# Patient Record
Sex: FEMALE | Race: WHITE | NOT HISPANIC OR LATINO | Employment: OTHER | URBAN - METROPOLITAN AREA
[De-identification: names, ages, dates, MRNs, and addresses within clinical notes are randomized per-mention and may not be internally consistent; named-entity substitution may affect disease eponyms.]

---

## 2014-11-03 LAB — HM PAP SMEAR: NEGATIVE

## 2014-12-05 LAB — HM MAMMOGRAM: NORMAL

## 2017-03-27 DIAGNOSIS — M85.80 OSTEOPENIA: ICD-10-CM

## 2017-03-27 DIAGNOSIS — R73.9 HYPERGLYCEMIA: Primary | ICD-10-CM

## 2017-03-27 DIAGNOSIS — E78.5 HYPERLIPIDEMIA, UNSPECIFIED HYPERLIPIDEMIA TYPE: ICD-10-CM

## 2017-03-28 LAB
25(OH)D3+25(OH)D2 SERPL-MCNC: 31.5 NG/ML (ref 30–100)
ALBUMIN SERPL-MCNC: 4.6 G/DL (ref 3.5–5.2)
ALBUMIN/GLOB SERPL: 2.1 G/DL
ALP SERPL-CCNC: 125 U/L (ref 39–117)
ALT SERPL-CCNC: 26 U/L (ref 1–33)
AST SERPL-CCNC: 23 U/L (ref 1–32)
BILIRUB SERPL-MCNC: 0.4 MG/DL (ref 0.1–1.2)
BUN SERPL-MCNC: 13 MG/DL (ref 8–23)
BUN/CREAT SERPL: 14.6 (ref 7–25)
CALCIUM SERPL-MCNC: 9.4 MG/DL (ref 8.6–10.5)
CHLORIDE SERPL-SCNC: 102 MMOL/L (ref 98–107)
CHOLEST SERPL-MCNC: 171 MG/DL (ref 0–200)
CO2 SERPL-SCNC: 26.5 MMOL/L (ref 22–29)
CREAT SERPL-MCNC: 0.89 MG/DL (ref 0.57–1)
GLOBULIN SER CALC-MCNC: 2.2 GM/DL
GLUCOSE SERPL-MCNC: 113 MG/DL (ref 65–99)
HBA1C MFR BLD: 5.74 % (ref 4.8–5.6)
HDLC SERPL-MCNC: 51 MG/DL (ref 40–60)
LDLC SERPL CALC-MCNC: 98 MG/DL (ref 0–100)
LDLC/HDLC SERPL: 1.93 {RATIO}
POTASSIUM SERPL-SCNC: 4.2 MMOL/L (ref 3.5–5.2)
PROT SERPL-MCNC: 6.8 G/DL (ref 6–8.5)
SODIUM SERPL-SCNC: 143 MMOL/L (ref 136–145)
TRIGL SERPL-MCNC: 108 MG/DL (ref 0–150)
VLDLC SERPL CALC-MCNC: 21.6 MG/DL (ref 5–40)

## 2017-03-30 ENCOUNTER — OFFICE VISIT (OUTPATIENT)
Dept: FAMILY MEDICINE CLINIC | Facility: CLINIC | Age: 72
End: 2017-03-30

## 2017-03-30 VITALS
TEMPERATURE: 97.8 F | BODY MASS INDEX: 31.02 KG/M2 | WEIGHT: 158 LBS | HEART RATE: 69 BPM | OXYGEN SATURATION: 98 % | SYSTOLIC BLOOD PRESSURE: 152 MMHG | RESPIRATION RATE: 16 BRPM | DIASTOLIC BLOOD PRESSURE: 72 MMHG | HEIGHT: 60 IN

## 2017-03-30 DIAGNOSIS — E78.00 PURE HYPERCHOLESTEROLEMIA: Primary | ICD-10-CM

## 2017-03-30 DIAGNOSIS — E55.9 MILD VITAMIN D DEFICIENCY: ICD-10-CM

## 2017-03-30 DIAGNOSIS — Z12.31 VISIT FOR SCREENING MAMMOGRAM: Primary | ICD-10-CM

## 2017-03-30 DIAGNOSIS — R73.9 BLOOD GLUCOSE ELEVATED: ICD-10-CM

## 2017-03-30 DIAGNOSIS — M85.80 OSTEOPENIA: ICD-10-CM

## 2017-03-30 PROCEDURE — 99213 OFFICE O/P EST LOW 20 MIN: CPT

## 2017-03-30 NOTE — PROGRESS NOTES
Subjective   Jody Simmons is a 71 y.o. female. Patient is here today for   Chief Complaint   Patient presents with   • Hyperglycemia   • Hyperlipidemia   • Osteopenia          Vitals:    03/30/17 0850   BP: 152/72   Pulse: 69   Resp: 16   Temp: 97.8 °F (36.6 °C)   SpO2: 98%     The following portions of the patient's history were reviewed and updated as appropriate: allergies, current medications, past family history, past medical history, past social history, past surgical history and problem list.    Past Medical History:   Diagnosis Date   • Dyslipidemia    • Endometrial polyp 04/21/2015    benign    • Fibroid    • H/O pelvic mass    • Hyperlipidemia    • Osteopenia    • PMB (postmenopausal bleeding)    • UTI (urinary tract infection)       Allergies   Allergen Reactions   • No Known Drug Allergy       Social History     Social History   • Marital status: Single     Spouse name: N/A   • Number of children: N/A   • Years of education: N/A     Occupational History   • Not on file.     Social History Main Topics   • Smoking status: Never Smoker   • Smokeless tobacco: Never Used   • Alcohol use Yes      Comment: SOCIAL   • Drug use: No   • Sexual activity: Yes     Partners: Male     Birth control/ protection: Post-menopausal     Other Topics Concern   • Not on file     Social History Narrative        Current Outpatient Prescriptions:   •  aspirin 81 MG tablet, Take  by mouth., Disp: , Rfl:   •  Cholecalciferol (VITAMIN D) 1000 UNITS tablet, Take  by mouth., Disp: , Rfl:   •  Multiple Minerals-Vitamins (ADVANCED CALCIUM/D/MAGNESIUM) tablet, Take  by mouth., Disp: , Rfl:   •  MULTIPLE VITAMINS ESSENTIAL PO, Take  by mouth., Disp: , Rfl:   •  Probiotic capsule, Take  by mouth., Disp: , Rfl:   •  simvastatin (ZOCOR) 20 MG tablet, Take 1 tablet by mouth every night., Disp: 90 tablet, Rfl: 3     Objective     History of Present Illness   The patient is here today for follow-up on hyperglycemia, hyperlipidemia, and vitamin  D deficiency.    Review of Systems   Constitutional: Negative.    HENT: Negative.    Respiratory: Negative for cough, shortness of breath and wheezing.    Cardiovascular: Negative for chest pain, palpitations and leg swelling.   Gastrointestinal: Negative for abdominal distention, abdominal pain, blood in stool, constipation, diarrhea and nausea.   Genitourinary: Negative.    Musculoskeletal:        Minor aches and pains only   Neurological: Negative.    Hematological: Negative.    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 She appears well-nourished.   Moderately overweight   Eyes: Pupils are equal, round, and reactive to light.   Neck:   Carotid pulses normal   Cardiovascular: Normal rate, regular rhythm and normal heart sounds.    Pulmonary/Chest: Effort normal and breath sounds normal. No respiratory distress. She has no wheezes. She has no rales.   Abdominal: Soft. Bowel sounds are normal. She exhibits no mass. There is no tenderness.   Musculoskeletal: Normal range of motion.   Neurological: She is alert and oriented to person, place, and time.   Skin: Skin is warm and dry.   Psychiatric: She has a normal mood and affect.   Nursing note and vitals reviewed.      ASSESSMENT  #1 hyperglycemia        #2 hyperlipidemia         #3 vitamin D deficiency         #4 osteopenia    DISCUSSION/SUMMARY    The patient's blood pressure was initially elevated at 152/72 but upon recheck it was down to 140/70.  The patient will monitor this and let me know if her average systolic blood pressure exceeds 140.  The patient's vitamin D level is 31.5 and I asked her to increase her vitamin D3 dose to 2000 units Daily.  CMP is normal except for elevated fasting blood sugar of 113 and minimal elevation of  her alkaline phosphatase at 125.  The patient's HbA1c is borderline elevated at 5.74%.  We again discussed a low sugar, low starch as well as low-fat diet.  The patient will try to increase her walking as well.  The patient's total cholesterol is 171, triglycerides 108, HDL cholesterol 51, and LDL cholesterol is 98.  The patient states that she went off her simvastatin for a few weeks several months ago because she was having some muscle weakness and achiness and she thinks it did improve.  The patient restarted her medicine a couple of months ago and feels that it does affect her.  Because of this I am going to switch her to atorvastatin 10 mg daily to see if she has better results with this medication as regards side effects.    PLAN  Mammogram in the next few months.  Vascular screening in the next few months.  Recheck in 6 months with fasting CMP, lipid panel, vitamin D level and hemoglobin A1c.    No Follow-up on file.

## 2017-08-18 ENCOUNTER — TRANSCRIBE ORDERS (OUTPATIENT)
Dept: ADMINISTRATIVE | Facility: HOSPITAL | Age: 72
End: 2017-08-18

## 2017-08-18 DIAGNOSIS — Z12.31 SCREENING MAMMOGRAM, ENCOUNTER FOR: Primary | ICD-10-CM

## 2017-08-18 DIAGNOSIS — Z13.6 ENCOUNTER FOR SCREENING FOR VASCULAR DISEASE: Primary | ICD-10-CM

## 2017-08-22 ENCOUNTER — HOSPITAL ENCOUNTER (OUTPATIENT)
Dept: MAMMOGRAPHY | Facility: HOSPITAL | Age: 72
Discharge: HOME | End: 2017-08-22

## 2017-08-22 DIAGNOSIS — Z12.31 SCREENING MAMMOGRAM, ENCOUNTER FOR: ICD-10-CM

## 2017-08-22 PROCEDURE — G0202 SCR MAMMO BI INCL CAD: HCPCS

## 2017-08-31 ENCOUNTER — HOSPITAL ENCOUNTER (OUTPATIENT)
Dept: CARDIOLOGY | Facility: HOSPITAL | Age: 72
Discharge: HOME | End: 2017-08-31

## 2017-08-31 VITALS
WEIGHT: 157 LBS | HEIGHT: 59 IN | HEART RATE: 75 BPM | DIASTOLIC BLOOD PRESSURE: 77 MMHG | SYSTOLIC BLOOD PRESSURE: 134 MMHG | BODY MASS INDEX: 31.65 KG/M2

## 2017-08-31 DIAGNOSIS — Z13.6 ENCOUNTER FOR SCREENING FOR VASCULAR DISEASE: ICD-10-CM

## 2017-08-31 LAB
BH CV ECHO MEAS - DIST AO DIAM: 1.48 CM
BH CV VAS BP LEFT ARM: NORMAL MMHG
BH CV VAS BP RIGHT ARM: NORMAL MMHG
BH CV XLRA MEAS - MID AO DIAM: 1.68 CM
BH CV XLRA MEAS - PAD LEFT ABI DP: 1.11
BH CV XLRA MEAS - PAD LEFT ABI PT: 1.14
BH CV XLRA MEAS - PAD LEFT ARM: 134 MMHG
BH CV XLRA MEAS - PAD LEFT LEG DP: 150 MMHG
BH CV XLRA MEAS - PAD LEFT LEG PT: 154 MMHG
BH CV XLRA MEAS - PAD RIGHT ABI DP: 1.11
BH CV XLRA MEAS - PAD RIGHT ABI PT: 1.13
BH CV XLRA MEAS - PAD RIGHT ARM: 134 MMHG
BH CV XLRA MEAS - PAD RIGHT LEG DP: 150 MMHG
BH CV XLRA MEAS - PAD RIGHT LEG PT: 152 MMHG
BH CV XLRA MEAS - PROX AO DIAM: 2.03 CM
BH CV XLRA MEAS LEFT ICA/CCA RATIO: 0.64
BH CV XLRA MEAS LEFT MID CCA PSV: NORMAL CM/SEC
BH CV XLRA MEAS LEFT MID ICA PSV: NORMAL CM/SEC
BH CV XLRA MEAS LEFT PROX ECA PSV: NORMAL CM/SEC
BH CV XLRA MEAS RIGHT ICA/CCA RATIO: 0.97
BH CV XLRA MEAS RIGHT MID CCA PSV: NORMAL CM/SEC
BH CV XLRA MEAS RIGHT MID ICA PSV: NORMAL CM/SEC
BH CV XLRA MEAS RIGHT PROX ECA PSV: NORMAL CM/SEC

## 2017-08-31 PROCEDURE — 93799 UNLISTED CV SVC/PROCEDURE: CPT

## 2017-09-25 DIAGNOSIS — R73.9 HYPERGLYCEMIA: Primary | ICD-10-CM

## 2017-09-25 DIAGNOSIS — E78.5 HYPERLIPIDEMIA, UNSPECIFIED HYPERLIPIDEMIA TYPE: ICD-10-CM

## 2017-09-25 DIAGNOSIS — E55.9 VITAMIN D DEFICIENCY: ICD-10-CM

## 2017-09-26 LAB
25(OH)D3+25(OH)D2 SERPL-MCNC: 39.1 NG/ML (ref 30–100)
ALBUMIN SERPL-MCNC: 4.5 G/DL (ref 3.5–5.2)
ALBUMIN/GLOB SERPL: 2 G/DL
ALP SERPL-CCNC: 124 U/L (ref 39–117)
ALT SERPL-CCNC: 29 U/L (ref 1–33)
AST SERPL-CCNC: 17 U/L (ref 1–32)
BILIRUB SERPL-MCNC: 0.4 MG/DL (ref 0.1–1.2)
BUN SERPL-MCNC: 16 MG/DL (ref 8–23)
BUN/CREAT SERPL: 18 (ref 7–25)
CALCIUM SERPL-MCNC: 9.5 MG/DL (ref 8.6–10.5)
CHLORIDE SERPL-SCNC: 104 MMOL/L (ref 98–107)
CHOLEST SERPL-MCNC: 185 MG/DL (ref 0–200)
CO2 SERPL-SCNC: 25.7 MMOL/L (ref 22–29)
CREAT SERPL-MCNC: 0.89 MG/DL (ref 0.57–1)
GLOBULIN SER CALC-MCNC: 2.3 GM/DL
GLUCOSE SERPL-MCNC: 106 MG/DL (ref 65–99)
HBA1C MFR BLD: 5.9 % (ref 4.8–5.6)
HDLC SERPL-MCNC: 48 MG/DL (ref 40–60)
LDLC SERPL CALC-MCNC: 106 MG/DL (ref 0–100)
LDLC/HDLC SERPL: 2.22 {RATIO}
POTASSIUM SERPL-SCNC: 4.3 MMOL/L (ref 3.5–5.2)
PROT SERPL-MCNC: 6.8 G/DL (ref 6–8.5)
SODIUM SERPL-SCNC: 142 MMOL/L (ref 136–145)
TRIGL SERPL-MCNC: 153 MG/DL (ref 0–150)
VLDLC SERPL CALC-MCNC: 30.6 MG/DL (ref 5–40)

## 2017-09-29 ENCOUNTER — OFFICE VISIT (OUTPATIENT)
Dept: FAMILY MEDICINE CLINIC | Facility: CLINIC | Age: 72
End: 2017-09-29

## 2017-09-29 VITALS
TEMPERATURE: 97.6 F | WEIGHT: 160 LBS | SYSTOLIC BLOOD PRESSURE: 132 MMHG | BODY MASS INDEX: 32.25 KG/M2 | HEIGHT: 59 IN | DIASTOLIC BLOOD PRESSURE: 78 MMHG | RESPIRATION RATE: 16 BRPM | HEART RATE: 70 BPM | OXYGEN SATURATION: 98 %

## 2017-09-29 DIAGNOSIS — R73.9 BLOOD GLUCOSE ELEVATED: Primary | ICD-10-CM

## 2017-09-29 DIAGNOSIS — E78.2 MIXED HYPERLIPIDEMIA: ICD-10-CM

## 2017-09-29 DIAGNOSIS — E55.9 MILD VITAMIN D DEFICIENCY: ICD-10-CM

## 2017-09-29 DIAGNOSIS — M85.80 OSTEOPENIA: ICD-10-CM

## 2017-09-29 PROCEDURE — 99213 OFFICE O/P EST LOW 20 MIN: CPT

## 2017-09-29 RX ORDER — PSEUDOEPHEDRINE HCL 30 MG
30 TABLET ORAL EVERY 4 HOURS PRN
COMMUNITY
End: 2018-03-27

## 2017-09-29 RX ORDER — ATORVASTATIN CALCIUM 10 MG/1
10 TABLET, FILM COATED ORAL DAILY
COMMUNITY
End: 2018-10-02 | Stop reason: SDUPTHER

## 2017-09-29 NOTE — PROGRESS NOTES
Subjective   Jody Simmons is a 71 y.o. female. Patient is here today for   Chief Complaint   Patient presents with   • Hyperglycemia   • Hyperlipidemia          Vitals:    09/29/17 0819   BP: 132/78   Pulse: 70   Resp: 16   Temp: 97.6 °F (36.4 °C)   SpO2: 98%     The following portions of the patient's history were reviewed and updated as appropriate: allergies, current medications, past family history, past medical history, past social history, past surgical history and problem list.    Past Medical History:   Diagnosis Date   • Dyslipidemia    • Endometrial polyp 04/21/2015    benign    • Fibroid    • H/O pelvic mass    • Hyperlipidemia    • Osteopenia    • PMB (postmenopausal bleeding)    • UTI (urinary tract infection)       Allergies   Allergen Reactions   • No Known Drug Allergy       Social History     Social History   • Marital status: Single     Spouse name: N/A   • Number of children: N/A   • Years of education: N/A     Occupational History   • Not on file.     Social History Main Topics   • Smoking status: Never Smoker   • Smokeless tobacco: Never Used   • Alcohol use Yes      Comment: SOCIAL   • Drug use: No   • Sexual activity: Yes     Partners: Male     Birth control/ protection: Post-menopausal     Other Topics Concern   • Not on file     Social History Narrative        Current Outpatient Prescriptions:   •  aspirin 81 MG tablet, Take  by mouth., Disp: , Rfl:   •  atorvastatin (LIPITOR) 10 MG tablet, Take 10 mg by mouth Daily., Disp: , Rfl:   •  Cholecalciferol (VITAMIN D) 1000 UNITS tablet, Take  by mouth., Disp: , Rfl:   •  MULTIPLE VITAMINS ESSENTIAL PO, Take  by mouth., Disp: , Rfl:   •  Nutritional Supplements (ESTROVEN PO), Take  by mouth., Disp: , Rfl:   •  pseudoephedrine (SUDAFED) 30 MG tablet, Take 30 mg by mouth Every 4 (Four) Hours As Needed for Congestion., Disp: , Rfl:      Objective     History of Present Illness   The patient is here today for follow-up on hyperglycemia,  hyperlipidemia and vitamin D deficiency    Review of Systems   Constitutional: Negative.    HENT: Negative.    Respiratory: Negative for cough, shortness of breath and wheezing.    Cardiovascular: Negative for chest pain, palpitations and leg swelling.   Gastrointestinal: Negative for abdominal pain, blood in stool, constipation and diarrhea.   Genitourinary: Negative.    Musculoskeletal:        Mild arthritic pain in the neck area   Neurological: Negative.    Hematological: Negative.    Psychiatric/Behavioral: Negative.        Physical Exam   Constitutional: She is oriented to person, place, and time. She appears well-developed and well-nourished.   Moderately overweight   Eyes: Pupils are equal, round, and reactive to light.   Neck:   Carotid pulses normal   Cardiovascular: Normal rate, regular rhythm and normal heart sounds.    Pulmonary/Chest: Effort normal and breath sounds normal. No respiratory distress. She has no wheezes. She has no rales.   Abdominal: Soft. Bowel sounds are normal. There is no tenderness.   Musculoskeletal: Normal range of motion.   Osteoarthritic changes in multiple joints   Neurological: She is alert and oriented to person, place, and time.   Skin: Skin is warm and dry.   Psychiatric: She has a normal mood and affect.   Nursing note and vitals reviewed.      ASSESSMENT  #1 hyperglycemia              #2 hyperlipidemia            #3 vitamin D deficiency    DISCUSSION/SUMMARY   The patient's vital signs are normal.  CMP is normal except for mild elevation of the serum glucose level at 106 and minimal elevation of the alkaline phosphatase.  The patient's hemoglobin A1c is 5.90%.  Vitamin D level is normal.  The patient's total cholesterol is 185, triglycerides 153, HDL cholesterol 48, and LDL cholesterol is 106.  The patient is now on atorvastatin 10 mg daily.  Because her lipid panel is slightly higher than last visit I'm going to have her come for a lab test only in 3 months for  recheck.    PLAN  Lab only, fasting labs, CMP lipid panel and HbA1c in 3 months.  Recheck in 6 months with CMP, lipid panel and HbA1c as well as vitamin D level  No Follow-up on file.

## 2017-12-18 DIAGNOSIS — E78.5 HYPERLIPIDEMIA, UNSPECIFIED HYPERLIPIDEMIA TYPE: Primary | ICD-10-CM

## 2017-12-18 DIAGNOSIS — R73.9 HYPERGLYCEMIA: ICD-10-CM

## 2017-12-20 LAB
ALBUMIN SERPL-MCNC: 4.3 G/DL (ref 3.5–5.2)
ALBUMIN/GLOB SERPL: 1.7 G/DL
ALP SERPL-CCNC: 136 U/L (ref 39–117)
ALT SERPL-CCNC: 35 U/L (ref 1–33)
AST SERPL-CCNC: 23 U/L (ref 1–32)
BILIRUB SERPL-MCNC: 0.4 MG/DL (ref 0.1–1.2)
BUN SERPL-MCNC: 11 MG/DL (ref 8–23)
BUN/CREAT SERPL: 11.8 (ref 7–25)
CALCIUM SERPL-MCNC: 8.9 MG/DL (ref 8.6–10.5)
CHLORIDE SERPL-SCNC: 103 MMOL/L (ref 98–107)
CHOLEST SERPL-MCNC: 180 MG/DL (ref 0–200)
CO2 SERPL-SCNC: 28.2 MMOL/L (ref 22–29)
CREAT SERPL-MCNC: 0.93 MG/DL (ref 0.57–1)
GLOBULIN SER CALC-MCNC: 2.6 GM/DL
GLUCOSE SERPL-MCNC: 114 MG/DL (ref 65–99)
HBA1C MFR BLD: 5.7 % (ref 4.8–5.6)
HDLC SERPL-MCNC: 53 MG/DL (ref 40–60)
LDLC SERPL CALC-MCNC: 97 MG/DL (ref 0–100)
LDLC/HDLC SERPL: 1.83 {RATIO}
POTASSIUM SERPL-SCNC: 4.3 MMOL/L (ref 3.5–5.2)
PROT SERPL-MCNC: 6.9 G/DL (ref 6–8.5)
SODIUM SERPL-SCNC: 143 MMOL/L (ref 136–145)
TRIGL SERPL-MCNC: 149 MG/DL (ref 0–150)
VLDLC SERPL CALC-MCNC: 29.8 MG/DL (ref 5–40)

## 2018-02-23 ENCOUNTER — OFFICE VISIT (OUTPATIENT)
Dept: FAMILY MEDICINE CLINIC | Facility: CLINIC | Age: 73
End: 2018-02-23

## 2018-02-23 VITALS
TEMPERATURE: 97.6 F | HEART RATE: 83 BPM | OXYGEN SATURATION: 97 % | HEIGHT: 59 IN | SYSTOLIC BLOOD PRESSURE: 172 MMHG | DIASTOLIC BLOOD PRESSURE: 80 MMHG | WEIGHT: 167 LBS | RESPIRATION RATE: 18 BRPM | BODY MASS INDEX: 33.67 KG/M2

## 2018-02-23 DIAGNOSIS — R20.8 DYSESTHESIA: Primary | ICD-10-CM

## 2018-02-23 DIAGNOSIS — N95.1 POST MENOPAUSAL SYNDROME: Primary | ICD-10-CM

## 2018-02-23 PROCEDURE — 99213 OFFICE O/P EST LOW 20 MIN: CPT

## 2018-02-23 NOTE — PROGRESS NOTES
Subjective   Jody Simmons is a 72 y.o. female. Patient is here today for No chief complaint on file.         Vitals:    02/23/18 0920   BP: 172/80   Pulse: 83   Resp: 18   Temp: 97.6 °F (36.4 °C)   SpO2: 97%     The following portions of the patient's history were reviewed and updated as appropriate: allergies, current medications, past family history, past medical history, past social history, past surgical history and problem list.    Past Medical History:   Diagnosis Date   • Dyslipidemia    • Endometrial polyp 04/21/2015    benign    • Fibroid    • H/O pelvic mass    • Hyperlipidemia    • Osteopenia    • PMB (postmenopausal bleeding)    • UTI (urinary tract infection)       Allergies   Allergen Reactions   • No Known Drug Allergy       Social History     Social History   • Marital status: Single     Spouse name: N/A   • Number of children: N/A   • Years of education: N/A     Occupational History   • Not on file.     Social History Main Topics   • Smoking status: Never Smoker   • Smokeless tobacco: Never Used   • Alcohol use Yes      Comment: SOCIAL   • Drug use: No   • Sexual activity: Yes     Partners: Male     Birth control/ protection: Post-menopausal     Other Topics Concern   • Not on file     Social History Narrative        Current Outpatient Prescriptions:   •  aspirin 81 MG tablet, Take  by mouth., Disp: , Rfl:   •  atorvastatin (LIPITOR) 10 MG tablet, Take 10 mg by mouth Daily., Disp: , Rfl:   •  Cholecalciferol (VITAMIN D) 1000 UNITS tablet, Take  by mouth., Disp: , Rfl:   •  MULTIPLE VITAMINS ESSENTIAL PO, Take  by mouth., Disp: , Rfl:   •  Nutritional Supplements (ESTROVEN PO), Take  by mouth., Disp: , Rfl:   •  pseudoephedrine (SUDAFED) 30 MG tablet, Take 30 mg by mouth Every 4 (Four) Hours As Needed for Congestion., Disp: , Rfl:      Objective     History of Present Illness   The patient is here today for a recheck on some abnormal sensations in the left parietal area  of her scalp    Review of  Systems   Constitutional: Negative.    HENT: Negative.    Respiratory: Negative for cough, shortness of breath and wheezing.    Cardiovascular: Negative for chest pain, palpitations and leg swelling.   Musculoskeletal:        Mild aches and pains only.  Patient denies any posterior neck pain.   Neurological:        The patient occasionally has some mild tickling sensation in the left parietal area of her scalp.  She has had this off and on since last summer.  The patient has no other abnormal neurological symptoms.  The area of her scalp that she has this sensation has not changed over time.  She has no other areas of the body with abnormal sensation.  No vision disturbances.   Psychiatric/Behavioral: Negative.        Physical Exam   Constitutional: She is oriented to person, place, and time. She appears well-developed and well-nourished.   Overweight   Eyes: Pupils are equal, round, and reactive to light.   Cardiovascular: Normal rate, regular rhythm and normal heart sounds.    Pulmonary/Chest: Effort normal and breath sounds normal.   Musculoskeletal:   No tender areas of scalp or trigger points   Neurological: She is alert and oriented to person, place, and time. No cranial nerve deficit.   Skin: Skin is warm and dry.   Psychiatric: She has a normal mood and affect.   Nursing note and vitals reviewed.      ASSESSMENT  #1 mild episodic dysesthesia of the left parietal scalp area    #2 elevated blood pressure without diagnosis of  Hypertension            #3 osteopenia    DISCUSSION/SUMMARY   Initially the patient's blood pressure was significantly elevated at 172/80.  After a period time I rechecked the patient's blood pressure and it came down to 144/78.  I have asked her to check her blood pressure on a very regular basis until she comes in to see me next month.  The patient is here because she is concerned about some abnormal sensations she has in her left parietal scalp area.  This is something that she has had  for at least 6 months off and on.  The patient does not have any pain or other abnormal neurological symptoms.  The patient gets a slight tingling and tickling sensation in the left parietal scalp area at times.  The area in question is in the left parietal area and has not expanded over this last 6-12 months.  The patient does have some history of arthritis in the spine area and I suspect that she is getting some muscle tightness putting pressure on sensory nerves  causing these sensations.  I have asked her just to observe for any change in the symptoms and she will let us know if they worsen.    PLAN  The patient already has an appointment set up for next month.  The patient will get a DEXA scan just prior to me seeing her at the end of March.  No Follow-up on file.

## 2018-03-16 ENCOUNTER — HOSPITAL ENCOUNTER (OUTPATIENT)
Dept: BONE DENSITY | Facility: HOSPITAL | Age: 73
Discharge: HOME | End: 2018-03-16

## 2018-03-16 PROCEDURE — 77080 DXA BONE DENSITY AXIAL: CPT

## 2018-03-19 DIAGNOSIS — Z11.59 NEED FOR HEPATITIS C SCREENING TEST: ICD-10-CM

## 2018-03-19 DIAGNOSIS — R73.9 HYPERGLYCEMIA: ICD-10-CM

## 2018-03-19 DIAGNOSIS — E78.5 HYPERLIPIDEMIA, UNSPECIFIED HYPERLIPIDEMIA TYPE: Primary | ICD-10-CM

## 2018-03-19 DIAGNOSIS — E55.9 VITAMIN D DEFICIENCY: ICD-10-CM

## 2018-03-21 LAB
25(OH)D3+25(OH)D2 SERPL-MCNC: 48 NG/ML (ref 30–100)
ALBUMIN SERPL-MCNC: 4.6 G/DL (ref 3.5–5.2)
ALBUMIN/GLOB SERPL: 2.1 G/DL
ALP SERPL-CCNC: 125 U/L (ref 39–117)
ALT SERPL-CCNC: 35 U/L (ref 1–33)
AST SERPL-CCNC: 19 U/L (ref 1–32)
BILIRUB SERPL-MCNC: 0.4 MG/DL (ref 0.1–1.2)
BUN SERPL-MCNC: 18 MG/DL (ref 8–23)
BUN/CREAT SERPL: 21.7 (ref 7–25)
CALCIUM SERPL-MCNC: 9.9 MG/DL (ref 8.6–10.5)
CHLORIDE SERPL-SCNC: 103 MMOL/L (ref 98–107)
CHOLEST SERPL-MCNC: 194 MG/DL (ref 0–200)
CO2 SERPL-SCNC: 28.5 MMOL/L (ref 22–29)
CREAT SERPL-MCNC: 0.83 MG/DL (ref 0.57–1)
GFR SERPLBLD CREATININE-BSD FMLA CKD-EPI: 68 ML/MIN/1.73
GFR SERPLBLD CREATININE-BSD FMLA CKD-EPI: 82 ML/MIN/1.73
GLOBULIN SER CALC-MCNC: 2.2 GM/DL
GLUCOSE SERPL-MCNC: 108 MG/DL (ref 65–99)
HBA1C MFR BLD: 5.99 % (ref 4.8–5.6)
HCV AB S/CO SERPL IA: 0.1 S/CO RATIO (ref 0–0.9)
HDLC SERPL-MCNC: 44 MG/DL (ref 40–60)
LDLC SERPL CALC-MCNC: 111 MG/DL (ref 0–100)
LDLC/HDLC SERPL: 2.52 {RATIO}
POTASSIUM SERPL-SCNC: 4.4 MMOL/L (ref 3.5–5.2)
PROT SERPL-MCNC: 6.8 G/DL (ref 6–8.5)
SODIUM SERPL-SCNC: 143 MMOL/L (ref 136–145)
TRIGL SERPL-MCNC: 195 MG/DL (ref 0–150)
VLDLC SERPL CALC-MCNC: 39 MG/DL (ref 5–40)

## 2018-03-27 ENCOUNTER — RESULTS ENCOUNTER (OUTPATIENT)
Dept: FAMILY MEDICINE CLINIC | Facility: CLINIC | Age: 73
End: 2018-03-27

## 2018-03-27 ENCOUNTER — OFFICE VISIT (OUTPATIENT)
Dept: FAMILY MEDICINE CLINIC | Facility: CLINIC | Age: 73
End: 2018-03-27

## 2018-03-27 VITALS
DIASTOLIC BLOOD PRESSURE: 82 MMHG | SYSTOLIC BLOOD PRESSURE: 148 MMHG | BODY MASS INDEX: 32.86 KG/M2 | OXYGEN SATURATION: 96 % | RESPIRATION RATE: 18 BRPM | TEMPERATURE: 97.5 F | HEIGHT: 59 IN | WEIGHT: 163 LBS | HEART RATE: 82 BPM

## 2018-03-27 DIAGNOSIS — Z12.11 SCREENING FOR COLON CANCER: Primary | ICD-10-CM

## 2018-03-27 DIAGNOSIS — Z12.11 SCREENING FOR COLON CANCER: ICD-10-CM

## 2018-03-27 DIAGNOSIS — E55.9 MILD VITAMIN D DEFICIENCY: ICD-10-CM

## 2018-03-27 DIAGNOSIS — M85.80 OSTEOPENIA, UNSPECIFIED LOCATION: ICD-10-CM

## 2018-03-27 DIAGNOSIS — R73.9 BLOOD GLUCOSE ELEVATED: ICD-10-CM

## 2018-03-27 DIAGNOSIS — E78.2 MIXED HYPERLIPIDEMIA: ICD-10-CM

## 2018-03-27 DIAGNOSIS — I10 ESSENTIAL HYPERTENSION: ICD-10-CM

## 2018-03-27 PROCEDURE — 99213 OFFICE O/P EST LOW 20 MIN: CPT

## 2018-03-27 RX ORDER — VALSARTAN 160 MG/1
160 TABLET ORAL DAILY
Qty: 30 TABLET | Refills: 3 | Status: SHIPPED | OUTPATIENT
Start: 2018-03-27 | End: 2018-06-15 | Stop reason: ALTCHOICE

## 2018-03-27 RX ORDER — ACETAMINOPHEN 325 MG/1
650 TABLET ORAL EVERY 6 HOURS PRN
COMMUNITY

## 2018-03-27 NOTE — PROGRESS NOTES
Subjective   Jody Simmons is a 72 y.o. female. Patient is here today for   Chief Complaint   Patient presents with   • Hyperlipidemia   • Hyperglycemia          Vitals:    03/27/18 0854   BP: 148/82   Pulse: 82   Resp: 18   Temp: 97.5 °F (36.4 °C)   SpO2: 96%     The following portions of the patient's history were reviewed and updated as appropriate: allergies, current medications, past family history, past medical history, past social history, past surgical history and problem list.    Past Medical History:   Diagnosis Date   • Dyslipidemia    • Endometrial polyp 04/21/2015    benign    • Fibroid    • H/O pelvic mass    • Hyperlipidemia    • Osteopenia    • PMB (postmenopausal bleeding)    • UTI (urinary tract infection)       Allergies   Allergen Reactions   • No Known Drug Allergy       Social History     Social History   • Marital status: Single     Spouse name: N/A   • Number of children: N/A   • Years of education: N/A     Occupational History   • Not on file.     Social History Main Topics   • Smoking status: Never Smoker   • Smokeless tobacco: Never Used   • Alcohol use Yes      Comment: SOCIAL   • Drug use: No   • Sexual activity: Yes     Partners: Male     Birth control/ protection: Post-menopausal     Other Topics Concern   • Not on file     Social History Narrative   • No narrative on file        Current Outpatient Prescriptions:   •  acetaminophen (TYLENOL) 325 MG tablet, Take 650 mg by mouth Every 6 (Six) Hours As Needed for Mild Pain ., Disp: , Rfl:   •  aspirin 81 MG tablet, Take  by mouth., Disp: , Rfl:   •  atorvastatin (LIPITOR) 10 MG tablet, Take 10 mg by mouth Daily., Disp: , Rfl:   •  Cholecalciferol (VITAMIN D) 1000 UNITS tablet, Take  by mouth., Disp: , Rfl:   •  MULTIPLE VITAMINS ESSENTIAL PO, Take  by mouth., Disp: , Rfl:   •  valsartan (DIOVAN) 160 MG tablet, Take 1 tablet by mouth Daily., Disp: 30 tablet, Rfl: 3     Objective     History of Present Illness   The patient is here today  for follow-up on borderline hyperglycemia, history of elevated blood pressure , hyperlipidemia, osteopenia and history of vitamin D deficiency    Review of Systems   Constitutional: Negative.    HENT: Negative.    Respiratory: Negative for cough, shortness of breath and wheezing.    Cardiovascular: Negative for chest pain, palpitations and leg swelling.   Gastrointestinal: Negative for abdominal pain, blood in stool, constipation and diarrhea.   Genitourinary: Negative.    Musculoskeletal:        The patient does have occasional stiffness and soreness in the posterior neck and left shoulder area.  She also has occasional sharp pain in the left parietal scalp area which seems positional   Neurological: Negative for tremors, speech difficulty, weakness, numbness and headaches.   Psychiatric/Behavioral: Negative.        Physical Exam   Constitutional: She is oriented to person, place, and time. She appears well-developed and well-nourished.   Moderately Overweight   Neck:   Carotid pulses normal   Cardiovascular: Normal rate, regular rhythm and normal heart sounds.    Pulmonary/Chest: Effort normal and breath sounds normal. No respiratory distress. She has no wheezes. She has no rales.   Abdominal: Soft. Bowel sounds are normal.   Musculoskeletal: Normal range of motion. She exhibits no edema.   Neurological: She is alert and oriented to person, place, and time.   Skin: Skin is warm and dry.   Psychiatric: She has a normal mood and affect.   Nursing note and vitals reviewed.      ASSESSMENT  #1 borderline hyperglycemia                 #2 essential hypertension               #3 hyperlipidemia                #4 osteopenia                #5 history of vitamin D deficiency    DISCUSSION/SUMMARY   The patient's blood pressure is 148/82 and her home readings that she has brought in are consistently elevated.  The patient's CMP shows an elevated fasting blood sugar of 108, slightly elevated alkaline phosphatase of 125 and a  very slightly elevated ALT of 35.  Hemoglobin A1c is only mildly elevated at 5.99%.  We again discussed a low sugar, low starch and low saturated fat diet.  The patient is actually been on a pretty good diet but she has not gotten any exercise to speak of over the winter and she will try to increase her walking now.  Vitamin D level is normal at 48.0 and the patient's hepatitis C antibody titer level was normal.  Total cholesterol is 194, triglycerides 195, HDL cholesterol 44 and LDL cholesterol is 111.  The patient's last colonoscopy was 2009 and it was normal.  The patient will prefer to get a colo-guard stool test rather than going back to get another colonoscopy.  If this test is positive she knows that she will need to get a colonoscopy done.  A recent bone density test still shows osteopenia but it is marginally improved since last bone density test about 2 years ago.  The patient continues to have episodic very brief episodes of a sharp pain in the left parietal scalp area.  This definitely seems to be positional.  There is slight tenderness in the localized area of the left parietal scalp.  This seems to be neuritic pain caused by some type of muscle spasm in that area and I have recommended that the patient do neck muscle exercises and massage of her scalp.    PLAN  Recheck in 3-4 months with fasting CMP, lipid panel and HbA1c  No Follow-up on file.

## 2018-04-10 ENCOUNTER — TELEPHONE (OUTPATIENT)
Dept: FAMILY MEDICINE CLINIC | Facility: CLINIC | Age: 73
End: 2018-04-10

## 2018-04-10 NOTE — TELEPHONE ENCOUNTER
Patient notified     ----- Message from Ubaldo Grant MD sent at 4/9/2018  2:52 PM EDT -----  Please tell patient that the colo-guard test was negative indicating a very low chance of any polyps in the colon

## 2018-06-11 DIAGNOSIS — R73.9 HYPERGLYCEMIA: ICD-10-CM

## 2018-06-11 DIAGNOSIS — I10 ESSENTIAL HYPERTENSION: Primary | ICD-10-CM

## 2018-06-11 DIAGNOSIS — E78.5 HYPERLIPIDEMIA, UNSPECIFIED HYPERLIPIDEMIA TYPE: ICD-10-CM

## 2018-06-12 LAB
ALBUMIN SERPL-MCNC: 4.2 G/DL (ref 3.5–5.2)
ALBUMIN/GLOB SERPL: 2 G/DL
ALP SERPL-CCNC: 119 U/L (ref 39–117)
ALT SERPL-CCNC: 34 U/L (ref 1–33)
AST SERPL-CCNC: 22 U/L (ref 1–32)
BILIRUB SERPL-MCNC: 0.3 MG/DL (ref 0.1–1.2)
BUN SERPL-MCNC: 11 MG/DL (ref 8–23)
BUN/CREAT SERPL: 14.7 (ref 7–25)
CALCIUM SERPL-MCNC: 9.1 MG/DL (ref 8.6–10.5)
CHLORIDE SERPL-SCNC: 104 MMOL/L (ref 98–107)
CHOLEST SERPL-MCNC: 167 MG/DL (ref 0–200)
CO2 SERPL-SCNC: 25.6 MMOL/L (ref 22–29)
CREAT SERPL-MCNC: 0.75 MG/DL (ref 0.57–1)
GFR SERPLBLD CREATININE-BSD FMLA CKD-EPI: 76 ML/MIN/1.73
GFR SERPLBLD CREATININE-BSD FMLA CKD-EPI: 92 ML/MIN/1.73
GLOBULIN SER CALC-MCNC: 2.1 GM/DL
GLUCOSE SERPL-MCNC: 104 MG/DL (ref 65–99)
HBA1C MFR BLD: 5.81 % (ref 4.8–5.6)
HDLC SERPL-MCNC: 42 MG/DL (ref 40–60)
LDLC SERPL CALC-MCNC: 76 MG/DL (ref 0–100)
LDLC/HDLC SERPL: 1.8 {RATIO}
POTASSIUM SERPL-SCNC: 4.7 MMOL/L (ref 3.5–5.2)
PROT SERPL-MCNC: 6.3 G/DL (ref 6–8.5)
SODIUM SERPL-SCNC: 142 MMOL/L (ref 136–145)
TRIGL SERPL-MCNC: 247 MG/DL (ref 0–150)
VLDLC SERPL CALC-MCNC: 49.4 MG/DL (ref 5–40)

## 2018-06-15 ENCOUNTER — OFFICE VISIT (OUTPATIENT)
Dept: FAMILY MEDICINE CLINIC | Facility: CLINIC | Age: 73
End: 2018-06-15

## 2018-06-15 VITALS
BODY MASS INDEX: 33.47 KG/M2 | DIASTOLIC BLOOD PRESSURE: 78 MMHG | SYSTOLIC BLOOD PRESSURE: 128 MMHG | TEMPERATURE: 97.4 F | HEART RATE: 77 BPM | WEIGHT: 166 LBS | HEIGHT: 59 IN | OXYGEN SATURATION: 98 % | RESPIRATION RATE: 16 BRPM

## 2018-06-15 DIAGNOSIS — R73.9 BLOOD GLUCOSE ELEVATED: Primary | ICD-10-CM

## 2018-06-15 DIAGNOSIS — E55.9 MILD VITAMIN D DEFICIENCY: ICD-10-CM

## 2018-06-15 DIAGNOSIS — E78.2 MIXED HYPERLIPIDEMIA: ICD-10-CM

## 2018-06-15 DIAGNOSIS — I10 ESSENTIAL HYPERTENSION: ICD-10-CM

## 2018-06-15 DIAGNOSIS — M85.80 OSTEOPENIA, UNSPECIFIED LOCATION: ICD-10-CM

## 2018-06-15 PROCEDURE — 99213 OFFICE O/P EST LOW 20 MIN: CPT

## 2018-06-15 RX ORDER — VALSARTAN 320 MG/1
320 TABLET ORAL DAILY
Qty: 30 TABLET | Refills: 2 | Status: SHIPPED | OUTPATIENT
Start: 2018-06-15 | End: 2018-07-17 | Stop reason: SDUPTHER

## 2018-06-15 NOTE — PROGRESS NOTES
Subjective   Jody Simmons is a 72 y.o. female. Patient is here today for   Chief Complaint   Patient presents with   • Hyperglycemia   • Hyperlipidemia   • Hypertension          Vitals:    06/15/18 0948   BP: 128/78   Pulse: 77   Resp: 16   Temp: 97.4 °F (36.3 °C)   SpO2: 98%     The following portions of the patient's history were reviewed and updated as appropriate: allergies, current medications, past family history, past medical history, past social history, past surgical history and problem list.    Past Medical History:   Diagnosis Date   • Dyslipidemia    • Endometrial polyp 04/21/2015    benign    • Fibroid    • H/O pelvic mass    • Hyperlipidemia    • Osteopenia    • PMB (postmenopausal bleeding)    • UTI (urinary tract infection)       Allergies   Allergen Reactions   • No Known Drug Allergy       Social History     Social History   • Marital status: Single     Spouse name: N/A   • Number of children: N/A   • Years of education: N/A     Occupational History   • Not on file.     Social History Main Topics   • Smoking status: Never Smoker   • Smokeless tobacco: Never Used   • Alcohol use Yes      Comment: SOCIAL   • Drug use: No   • Sexual activity: Yes     Partners: Male     Birth control/ protection: Post-menopausal     Other Topics Concern   • Not on file     Social History Narrative   • No narrative on file        Current Outpatient Prescriptions:   •  acetaminophen (TYLENOL) 325 MG tablet, Take 650 mg by mouth Every 6 (Six) Hours As Needed for Mild Pain ., Disp: , Rfl:   •  aspirin 81 MG tablet, Take  by mouth., Disp: , Rfl:   •  atorvastatin (LIPITOR) 10 MG tablet, Take 10 mg by mouth Daily., Disp: , Rfl:   •  Cholecalciferol (VITAMIN D) 1000 UNITS tablet, Take  by mouth., Disp: , Rfl:   •  MULTIPLE VITAMINS ESSENTIAL PO, Take  by mouth., Disp: , Rfl:   •  valsartan (DIOVAN) 320 MG tablet, Take 1 tablet by mouth Daily., Disp: 30 tablet, Rfl: 2     Objective     History of Present Illness   The patient  is here today for follow-up on hyperglycemia, mixed hyperlipidemia, and essential hypertension    Review of Systems   Constitutional: Negative for chills and fever.        The patient states that she has not been doing very much walking at all recently.  She has gained some weight.   HENT: Negative.    Respiratory: Negative for cough, shortness of breath and wheezing.    Cardiovascular: Negative for chest pain, palpitations and leg swelling.   Gastrointestinal: Negative for abdominal pain, blood in stool, constipation and diarrhea.   Genitourinary: Negative.    Musculoskeletal:        Mild aches and pains only   Neurological: Negative.    Hematological: Negative.    Psychiatric/Behavioral: Negative.        Physical Exam   Constitutional: She is oriented to person, place, and time. She appears well-developed and well-nourished.   Overweight   Neck:   Carotid pulses normal   Cardiovascular: Normal rate, regular rhythm and normal heart sounds.    Pulmonary/Chest: Effort normal and breath sounds normal. No respiratory distress. She has no wheezes. She has no rales.   Abdominal: Soft.   Musculoskeletal: She exhibits no edema.   Mild osteoarthritic changes in multiple joints   Neurological: She is alert and oriented to person, place, and time.   Skin: Skin is warm and dry.   Psychiatric: She has a normal mood and affect.   Nursing note and vitals reviewed.      ASSESSMENT  #1 hyperglycemia                    #2 essential hypertension                 #3 hyperlipidemia    DISCUSSION/SUMMARY   Blood pressure today is 128/78 but the patient's home blood pressure readings generally are still running a little too high.  I'm going to change her dose of valsartan from 160 mg daily to 320 mg daily.  The patient will closely monitor her blood pressures and bring her readings by me to review in about 2 or 3 weeks.  CMP shows an elevated fasting blood sugar of 104, minimally elevated alkaline phosphatase at 119 and very minimally  elevated ALT at 34.  Hemoglobin A1c is minimally elevated at 5.81%.  We again discussed a low sugar, low starch and low saturated fat diet but mainly I encouraged her to increase her walking.  Total cholesterol is 167, triglycerides 247, HDL cholesterol 42 and LDL cholesterol 76.    PLAN   Recheck in 6 months with fasting CMP, lipid panel and hemoglobin A1c and vitamin D level  No Follow-up on file.

## 2018-07-17 ENCOUNTER — TELEPHONE (OUTPATIENT)
Dept: FAMILY MEDICINE CLINIC | Facility: CLINIC | Age: 73
End: 2018-07-17

## 2018-07-17 RX ORDER — VALSARTAN 320 MG/1
320 TABLET ORAL DAILY
Qty: 30 TABLET | Refills: 0 | Status: SHIPPED | OUTPATIENT
Start: 2018-07-17 | End: 2018-08-02 | Stop reason: SDUPTHER

## 2018-07-17 NOTE — TELEPHONE ENCOUNTER
Patient aware that I sent a 30 - day supply to [facility]    ----- Message from Leah Hampton sent at 7/17/2018  9:12 AM EDT -----  PATIENT CALLED AND SAID THAT SHE TAKE THE VALSARTAN THAT HAS BEEN RECALLED. I TOLD HER THAT [facility] AND [facility] HAVE THE OTHER BRANDS IF SHE WOULD LIKE TO TRANSFER HER MEDICATION THERE. SHE IS WANTING TO KNOW IF DR TELLO WOULD LIKE HER TO STILL TAKE THE MEDICATION.    PATIENT HAS ENOUGH FOR THE NEXT WEEK AND A HALF AND THEN WILL NEED A REFILL.    PLEASE CALL PT BACK [phone]

## 2018-08-02 RX ORDER — VALSARTAN 320 MG/1
320 TABLET ORAL DAILY
Qty: 90 TABLET | Refills: 0 | Status: SHIPPED | OUTPATIENT
Start: 2018-08-02 | End: 2018-08-03 | Stop reason: ALTCHOICE

## 2018-08-03 RX ORDER — LOSARTAN POTASSIUM 100 MG/1
100 TABLET ORAL DAILY
Qty: 90 TABLET | Refills: 0 | Status: SHIPPED | OUTPATIENT
Start: 2018-08-03 | End: 2018-11-02 | Stop reason: SDUPTHER

## 2018-08-03 NOTE — TELEPHONE ENCOUNTER
[facility] sent a paper stating that pt's valsartan was not covered due to being out of stock due to the manufacture recall advised with Dr. TELLO and he told me to change it to losartan 100 mg 1 x's daily.  I called and notified patient and let her know to keep an eye on her b/p and made sure she understood everything clearly

## 2018-10-02 RX ORDER — ATORVASTATIN CALCIUM 10 MG/1
10 TABLET, FILM COATED ORAL DAILY
Qty: 90 TABLET | Refills: 3 | Status: SHIPPED | OUTPATIENT
Start: 2018-10-02 | End: 2019-10-02 | Stop reason: SDUPTHER

## 2018-10-03 ENCOUNTER — TELEPHONE (OUTPATIENT)
Dept: FAMILY MEDICINE CLINIC | Facility: CLINIC | Age: 73
End: 2018-10-03

## 2018-10-03 NOTE — TELEPHONE ENCOUNTER
Left voicemail letting patient know.    ----- Message from Ubaldo Grant MD sent at 10/2/2018  3:56 PM EDT -----  Blood pressure readings look to be about the same on losartan as with valsartan.  Tell her to continue losartan and we will discuss blood pressure further when she comes in later this year  ----- Message -----  From: Jossy Corey MA  Sent: 10/2/2018   8:42 AM  To: Ubaldo Grant MD

## 2018-11-02 RX ORDER — LOSARTAN POTASSIUM 100 MG/1
100 TABLET ORAL DAILY
Qty: 90 TABLET | Refills: 0 | Status: SHIPPED | OUTPATIENT
Start: 2018-11-02 | End: 2018-12-14 | Stop reason: SDUPTHER

## 2018-12-07 DIAGNOSIS — R73.9 HYPERGLYCEMIA: ICD-10-CM

## 2018-12-07 DIAGNOSIS — I10 ESSENTIAL HYPERTENSION: Primary | ICD-10-CM

## 2018-12-07 DIAGNOSIS — E55.9 VITAMIN D DEFICIENCY: ICD-10-CM

## 2018-12-07 DIAGNOSIS — E78.5 HYPERLIPIDEMIA, UNSPECIFIED HYPERLIPIDEMIA TYPE: ICD-10-CM

## 2018-12-11 LAB
25(OH)D3+25(OH)D2 SERPL-MCNC: 42.4 NG/ML (ref 30–100)
ALBUMIN SERPL-MCNC: 4.4 G/DL (ref 3.5–5.2)
ALBUMIN/GLOB SERPL: 2.1 G/DL
ALP SERPL-CCNC: 126 U/L (ref 39–117)
ALT SERPL-CCNC: 27 U/L (ref 1–33)
AST SERPL-CCNC: 18 U/L (ref 1–32)
BILIRUB SERPL-MCNC: 0.3 MG/DL (ref 0.1–1.2)
BUN SERPL-MCNC: 14 MG/DL (ref 8–23)
BUN/CREAT SERPL: 15.2 (ref 7–25)
CALCIUM SERPL-MCNC: 9.2 MG/DL (ref 8.6–10.5)
CHLORIDE SERPL-SCNC: 103 MMOL/L (ref 98–107)
CHOLEST SERPL-MCNC: 178 MG/DL (ref 0–200)
CO2 SERPL-SCNC: 26.4 MMOL/L (ref 22–29)
CREAT SERPL-MCNC: 0.92 MG/DL (ref 0.57–1)
GLOBULIN SER CALC-MCNC: 2.1 GM/DL
GLUCOSE SERPL-MCNC: 113 MG/DL (ref 65–99)
HBA1C MFR BLD: 5.87 % (ref 4.8–5.6)
HDLC SERPL-MCNC: 46 MG/DL (ref 40–60)
LDLC SERPL CALC-MCNC: 102 MG/DL (ref 0–100)
LDLC/HDLC SERPL: 2.22 {RATIO}
POTASSIUM SERPL-SCNC: 4.6 MMOL/L (ref 3.5–5.2)
PROT SERPL-MCNC: 6.5 G/DL (ref 6–8.5)
SODIUM SERPL-SCNC: 141 MMOL/L (ref 136–145)
TRIGL SERPL-MCNC: 149 MG/DL (ref 0–150)
VLDLC SERPL CALC-MCNC: 29.8 MG/DL (ref 5–40)

## 2018-12-14 ENCOUNTER — OFFICE VISIT (OUTPATIENT)
Dept: FAMILY MEDICINE CLINIC | Facility: CLINIC | Age: 73
End: 2018-12-14

## 2018-12-14 VITALS
TEMPERATURE: 97.3 F | HEART RATE: 89 BPM | OXYGEN SATURATION: 97 % | RESPIRATION RATE: 18 BRPM | WEIGHT: 166 LBS | DIASTOLIC BLOOD PRESSURE: 72 MMHG | HEIGHT: 59 IN | SYSTOLIC BLOOD PRESSURE: 144 MMHG | BODY MASS INDEX: 33.47 KG/M2

## 2018-12-14 DIAGNOSIS — E78.2 MIXED HYPERLIPIDEMIA: ICD-10-CM

## 2018-12-14 DIAGNOSIS — R73.9 BLOOD GLUCOSE ELEVATED: Primary | ICD-10-CM

## 2018-12-14 DIAGNOSIS — I10 ESSENTIAL HYPERTENSION: ICD-10-CM

## 2018-12-14 PROCEDURE — 99213 OFFICE O/P EST LOW 20 MIN: CPT

## 2018-12-14 RX ORDER — LOSARTAN POTASSIUM 100 MG/1
100 TABLET ORAL DAILY
Qty: 90 TABLET | Refills: 3 | Status: SHIPPED | OUTPATIENT
Start: 2018-12-14 | End: 2019-09-30 | Stop reason: SDUPTHER

## 2018-12-14 NOTE — PROGRESS NOTES
Subjective   Jody Simmons is a 73 y.o. female. Patient is here today for   Chief Complaint   Patient presents with   • Hyperglycemia   • Hyperlipidemia   • Hypertension          Vitals:    12/14/18 0958   BP: 144/72   Pulse: 89   Resp: 18   Temp: 97.3 °F (36.3 °C)   SpO2: 97%     The following portions of the patient's history were reviewed and updated as appropriate: allergies, current medications, past family history, past medical history, past social history, past surgical history and problem list.    Past Medical History:   Diagnosis Date   • Dyslipidemia    • Endometrial polyp 04/21/2015    benign    • Fibroid    • H/O pelvic mass    • Hyperlipidemia    • Osteopenia    • PMB (postmenopausal bleeding)    • UTI (urinary tract infection)       Allergies   Allergen Reactions   • No Known Drug Allergy       Social History     Socioeconomic History   • Marital status: Single     Spouse name: Not on file   • Number of children: Not on file   • Years of education: Not on file   • Highest education level: Not on file   Social Needs   • Financial resource strain: Not on file   • Food insecurity - worry: Not on file   • Food insecurity - inability: Not on file   • Transportation needs - medical: Not on file   • Transportation needs - non-medical: Not on file   Occupational History   • Not on file   Tobacco Use   • Smoking status: Never Smoker   • Smokeless tobacco: Never Used   Substance and Sexual Activity   • Alcohol use: Yes     Comment: SOCIAL   • Drug use: No   • Sexual activity: Yes     Partners: Male     Birth control/protection: Post-menopausal   Other Topics Concern   • Not on file   Social History Narrative   • Not on file        Current Outpatient Medications:   •  acetaminophen (TYLENOL) 325 MG tablet, Take 650 mg by mouth Every 6 (Six) Hours As Needed for Mild Pain ., Disp: , Rfl:   •  aspirin 81 MG tablet, Take  by mouth., Disp: , Rfl:   •  atorvastatin (LIPITOR) 10 MG tablet, Take 1 tablet by mouth Daily.,  Disp: 90 tablet, Rfl: 3  •  Cholecalciferol (VITAMIN D) 1000 UNITS tablet, Take  by mouth., Disp: , Rfl:   •  fluticasone (CUTIVATE) 0.05 % cream, Apply  topically to the appropriate area as directed 2 (Two) Times a Day., Disp: 30 g, Rfl: 0  •  losartan (COZAAR) 100 MG tablet, Take 1 tablet by mouth Daily., Disp: 90 tablet, Rfl: 0  •  MULTIPLE VITAMINS ESSENTIAL PO, Take  by mouth., Disp: , Rfl:      Objective     History of Present Illness   The patient is here today for follow-up on hyperglycemia, essential hypertension, mixed hyperlipidemia and vitamin D deficiency    Review of Systems   Constitutional: Negative for activity change, chills and fever.        The patient's diet is only fair.  She states that she was on vacation twice in the last few months and did not eat very well during those times.   HENT: Negative.    Respiratory: Negative for cough, shortness of breath and wheezing.    Cardiovascular: Negative for chest pain, palpitations and leg swelling.   Gastrointestinal: Negative for abdominal pain, blood in stool and constipation.   Genitourinary: Negative.    Musculoskeletal:        Mild osteoarthritic aches and pains in various joints   Neurological: Negative.    Hematological: Negative.    Psychiatric/Behavioral: Negative.        Physical Exam   Constitutional: She is oriented to person, place, and time. She appears well-developed and well-nourished.   Overweight   Neck:   Carotid pulses normal   Cardiovascular: Normal rate, regular rhythm and normal heart sounds.   Pulmonary/Chest: Effort normal and breath sounds normal. No respiratory distress. She has no wheezes. She has no rales.   Abdominal: Soft. Bowel sounds are normal.   Musculoskeletal: She exhibits no edema.   Mild osteoarthritic changes in multiple joints   Neurological: She is alert and oriented to person, place, and time.   Skin: Skin is warm and dry.   Psychiatric: She has a normal mood and affect.   Nursing note and vitals  reviewed.      ASSESSMENT  #1 hyperglycemia                  #2 essential hypertension                #3 mixed hyperlipidemia                  #4 vitamin D deficiency    DISCUSSION/SUMMARY   The patient's blood pressure was mildly elevated today at 144/72 but recent home blood pressure readings average in the 120s systolic and 70s diastolic.  She will continue to monitor her blood pressure at home.  CMP showed an elevated fasting blood sugar of 113 and slightly elevated alkaline phosphatase of 126.  Hemoglobin A1c was 5.87%.  We again discussed a low sugar, low starch and low saturated fat diet.  The patient also needs to increase her aerobic exercise in the form of walking.  Vitamin D level  Was normal at 42.4.  Total cholesterol is 178, triglycerides 149, HDL cholesterol 46 and LDL cholesterol is 102.  Overall the patient is doing well but admits that her diet has not been ideal recently.  She is also not getting as much exercise as she needs to.  I will see the patient again in 6 months    PLAN  Recheck 6 months with fasting CMP, lipid panel, hemoglobin A1c and vitamin D level  No Follow-up on file.

## 2018-12-17 ENCOUNTER — OFFICE VISIT (OUTPATIENT)
Dept: FAMILY MEDICINE CLINIC | Facility: CLINIC | Age: 73
End: 2018-12-17

## 2018-12-17 VITALS
BODY MASS INDEX: 33.26 KG/M2 | HEART RATE: 74 BPM | TEMPERATURE: 97.6 F | HEIGHT: 59 IN | WEIGHT: 165 LBS | OXYGEN SATURATION: 95 % | DIASTOLIC BLOOD PRESSURE: 76 MMHG | SYSTOLIC BLOOD PRESSURE: 128 MMHG

## 2018-12-17 DIAGNOSIS — Z00.00 INITIAL MEDICARE ANNUAL WELLNESS VISIT: Primary | ICD-10-CM

## 2018-12-17 PROCEDURE — G0438 PPPS, INITIAL VISIT: HCPCS | Performed by: NURSE PRACTITIONER

## 2018-12-17 NOTE — PROGRESS NOTES
QUICK REFERENCE INFORMATION:  The ABCs of the Annual Wellness Visit    Initial Medicare Wellness Visit    HEALTH RISK ASSESSMENT    1945    Recent Hospitalizations:  No hospitalization(s) within the last year..        Current Medical Providers:  Patient Care Team:  Ubaldo Grant MD as PCP - General        Smoking Status:  Social History     Tobacco Use   Smoking Status Never Smoker   Smokeless Tobacco Never Used       Alcohol Consumption:  Social History     Substance and Sexual Activity   Alcohol Use Yes    Comment: SOCIAL rare       Depression Screen:   PHQ-2/PHQ-9 Depression Screening 12/17/2018   Little interest or pleasure in doing things 0   Feeling down, depressed, or hopeless 0   Total Score 0       Health Habits and Functional and Cognitive Screening:  Functional & Cognitive Status 12/17/2018   Do you have difficulty preparing food and eating? No   Do you have difficulty bathing yourself, getting dressed or grooming yourself? No   Do you have difficulty using the toilet? No   Do you have difficulty moving around from place to place? No   Do you have trouble with steps or getting out of a bed or a chair? No   In the past year have you fallen or experienced a near fall? No   Current Diet Limited Junk Food   Dental Exam Up to date   Eye Exam Up to date   Exercise (times per week) 0 times per week   Current Exercise Activities Include No Regular Exercise   Do you need help using the phone?  No   Are you deaf or do you have serious difficulty hearing?  No   Do you need help with transportation? No   Do you need help shopping? No   Do you need help preparing meals?  No   Do you need help with housework?  No   Do you need help with laundry? No   Do you need help taking your medications? No   Do you need help managing money? No   Do you ever drive or ride in a car without wearing a seat belt? No   Have you felt unusual stress, anger or loneliness in the last month? Yes   Who do you live with? Alone    If you need help, do you have trouble finding someone available to you? No   Have you been bothered in the last four weeks by sexual problems? No   Do you have difficulty concentrating, remembering or making decisions? No           Does the patient have evidence of cognitive impairment? No    Asiprin use counseling: Taking ASA appropriately as indicated      Recent Lab Results:    Visual Acuity:  No exam data present    Age-appropriate Screening Schedule:  Refer to the list below for future screening recommendations based on patient's age, sex and/or medical conditions. Orders for these recommended tests are listed in the plan section. The patient has been provided with a written plan.    Health Maintenance   Topic Date Due   • TDAP/TD VACCINES (1 - Tdap) 12/14/1965   • ZOSTER VACCINE (2 of 2) 05/22/2018   • MAMMOGRAM  08/22/2019   • COLONOSCOPY  10/21/2019   • LIPID PANEL  12/10/2019   • DXA SCAN  03/16/2020   • INFLUENZA VACCINE  Completed   • PNEUMOCOCCAL VACCINES (65+ LOW/MEDIUM RISK)  Completed        Subjective   History of Present Illness    Jody Simmons is a 73 y.o. female who presents for an Annual Wellness Visit.    The following portions of the patient's history were reviewed and updated as appropriate: allergies, current medications, past family history, past medical history, past social history, past surgical history and problem list.    Outpatient Medications Prior to Visit   Medication Sig Dispense Refill   • acetaminophen (TYLENOL) 325 MG tablet Take 650 mg by mouth Every 6 (Six) Hours As Needed for Mild Pain .     • aspirin 81 MG tablet Take  by mouth.     • atorvastatin (LIPITOR) 10 MG tablet Take 1 tablet by mouth Daily. 90 tablet 3   • Calcium Carbonate-Vitamin D (CALTRATE 600+D PO) Take  by mouth.     • Cholecalciferol (VITAMIN D) 1000 UNITS tablet Take  by mouth.     • losartan (COZAAR) 100 MG tablet Take 1 tablet by mouth Daily. 90 tablet 3   • fluticasone (CUTIVATE) 0.05 % cream Apply   "topically to the appropriate area as directed 2 (Two) Times a Day. 30 g 0   • MULTIPLE VITAMINS ESSENTIAL PO Take  by mouth.       No facility-administered medications prior to visit.        Patient Active Problem List   Diagnosis   • Blood glucose elevated   • Mixed hyperlipidemia   • Osteopenia   • Mild vitamin D deficiency   • Dysesthesia   • Essential hypertension       Advance Care Planning:  has an advance directive - a copy has been provided and is in file    Identification of Risk Factors:  Risk factors include: cardiovascular risk.    Review of Systems    Compared to one year ago, the patient feels her physical health is the same.  Compared to one year ago, the patient feels her mental health is the same.    Objective     Physical Exam    Vitals:    12/17/18 0932   BP: 128/76   Pulse: 74   Temp: 97.6 °F (36.4 °C)   SpO2: 95%   Weight: 74.8 kg (165 lb)   Height: 149.9 cm (59\")       Patient's Body mass index is 33.33 kg/m². BMI is above normal parameters. Recommendations include: no follow-up required.      Assessment/Plan   Patient Self-Management and Personalized Health Advice  The patient has been provided with information about: . and preventive services including:   · discussed shingrix, tdap at pharmacy and needs second hepatitis A at the pharmacy .    Visit Diagnoses:    ICD-10-CM ICD-9-CM   1. Initial Medicare annual wellness visit Z00.00 V70.0       No orders of the defined types were placed in this encounter.      Outpatient Encounter Medications as of 12/17/2018   Medication Sig Dispense Refill   • acetaminophen (TYLENOL) 325 MG tablet Take 650 mg by mouth Every 6 (Six) Hours As Needed for Mild Pain .     • aspirin 81 MG tablet Take  by mouth.     • atorvastatin (LIPITOR) 10 MG tablet Take 1 tablet by mouth Daily. 90 tablet 3   • Calcium Carbonate-Vitamin D (CALTRATE 600+D PO) Take  by mouth.     • Cholecalciferol (VITAMIN D) 1000 UNITS tablet Take  by mouth.     • losartan (COZAAR) 100 MG tablet " Take 1 tablet by mouth Daily. 90 tablet 3   • fluticasone (CUTIVATE) 0.05 % cream Apply  topically to the appropriate area as directed 2 (Two) Times a Day. 30 g 0   • [DISCONTINUED] MULTIPLE VITAMINS ESSENTIAL PO Take  by mouth.       No facility-administered encounter medications on file as of 12/17/2018.        Reviewed use of high risk medication in the elderly: yes  Reviewed for potential of harmful drug interactions in the elderly: yes    Follow Up:  Follow up in June as scheduled      An After Visit Summary and PPPS with all of these plans were given to the patient.

## 2019-03-22 ENCOUNTER — TELEPHONE (OUTPATIENT)
Dept: FAMILY MEDICINE CLINIC | Facility: CLINIC | Age: 74
End: 2019-03-22

## 2019-03-22 NOTE — TELEPHONE ENCOUNTER
Spoke with patient and scheduled her with Dr. Grant Monday 3/25/19    ----- Message from Florina Castro sent at 3/21/2019  2:45 PM EDT -----  PT IS   DIZZY WITH LOSS OF BALANCE AND HER EYE STARTED TWITCHING AND DOESN'T WANT TO SCHEDULE AN APPT WITH ANYONE ELSE AND ONLY WANTS TO SEE DR WANG AND WANTS TO KNOW IF HE WILL WORK HER IN     PLEASE CALL PT TO LET HER KNOW WHAT DR WANG SAYS     [phone]

## 2019-03-25 ENCOUNTER — OFFICE VISIT (OUTPATIENT)
Dept: FAMILY MEDICINE CLINIC | Facility: CLINIC | Age: 74
End: 2019-03-25

## 2019-03-25 VITALS
OXYGEN SATURATION: 98 % | TEMPERATURE: 97.4 F | DIASTOLIC BLOOD PRESSURE: 80 MMHG | HEART RATE: 91 BPM | RESPIRATION RATE: 18 BRPM | SYSTOLIC BLOOD PRESSURE: 150 MMHG | HEIGHT: 59 IN | BODY MASS INDEX: 34.07 KG/M2 | WEIGHT: 169 LBS

## 2019-03-25 DIAGNOSIS — R26.89 BALANCE DISORDER: ICD-10-CM

## 2019-03-25 DIAGNOSIS — R42 DIZZINESS: ICD-10-CM

## 2019-03-25 DIAGNOSIS — G89.29 CHRONIC LEFT-SIDED HEADACHES: Primary | ICD-10-CM

## 2019-03-25 DIAGNOSIS — R51.9 CHRONIC LEFT-SIDED HEADACHES: Primary | ICD-10-CM

## 2019-03-25 PROCEDURE — 99213 OFFICE O/P EST LOW 20 MIN: CPT

## 2019-03-25 NOTE — PROGRESS NOTES
Subjective   Jody Simmons is a 73 y.o. female. Patient is here today for   Chief Complaint   Patient presents with   • Dizziness          Vitals:    03/25/19 0756   BP: 150/80   Pulse: 91   Resp: 18   Temp: 97.4 °F (36.3 °C)   SpO2: 98%     The following portions of the patient's history were reviewed and updated as appropriate: allergies, current medications, past family history, past medical history, past social history, past surgical history and problem list.    Past Medical History:   Diagnosis Date   • Dyslipidemia    • Endometrial polyp 04/21/2015    benign    • Fibroid    • H/O pelvic mass    • Hyperlipidemia    • Osteopenia    • PMB (postmenopausal bleeding)    • UTI (urinary tract infection)       Allergies   Allergen Reactions   • No Known Drug Allergy       Social History     Socioeconomic History   • Marital status: Single     Spouse name: Not on file   • Number of children: Not on file   • Years of education: Not on file   • Highest education level: Not on file   Tobacco Use   • Smoking status: Never Smoker   • Smokeless tobacco: Never Used   Substance and Sexual Activity   • Alcohol use: Yes     Comment: SOCIAL rare   • Drug use: No   • Sexual activity: Yes     Partners: Male     Birth control/protection: Post-menopausal        Current Outpatient Medications:   •  acetaminophen (TYLENOL) 325 MG tablet, Take 650 mg by mouth Every 6 (Six) Hours As Needed for Mild Pain ., Disp: , Rfl:   •  aspirin 81 MG tablet, Take  by mouth., Disp: , Rfl:   •  atorvastatin (LIPITOR) 10 MG tablet, Take 1 tablet by mouth Daily., Disp: 90 tablet, Rfl: 3  •  Calcium Carbonate-Vitamin D (CALTRATE 600+D PO), Take  by mouth., Disp: , Rfl:   •  Cholecalciferol (VITAMIN D) 1000 UNITS tablet, Take  by mouth., Disp: , Rfl:   •  fluticasone (CUTIVATE) 0.05 % cream, Apply  topically to the appropriate area as directed 2 (Two) Times a Day., Disp: 30 g, Rfl: 0  •  losartan (COZAAR) 100 MG tablet, Take 1 tablet by mouth Daily., Disp:  90 tablet, Rfl: 3     Objective     History of Present Illness   The patient is here today for evaluation of chronic recurrent headaches and recent onset of dizziness, balance disorder and visual disturbance    Review of Systems   Constitutional: Negative for chills and fever.   HENT:        The patient has had some nasal and sinus congestion in this last week.  She does not have any ear ache.  No sore throat   Eyes:        Patient denies double vision.  There has been slight blurriness of the vision when she has twitching of her left eye   Respiratory: Negative for cough, shortness of breath and wheezing.    Cardiovascular: Negative for chest pain, palpitations and leg swelling.   Gastrointestinal: Negative for abdominal pain.   Genitourinary: Negative.    Musculoskeletal:        Minor arthritic aches and pains   Neurological:        Since last week the patient has had episodic dizziness with a couple of episodes of vertigo.  She has had no nausea or vomiting.  The patient states that she has had some twitching of her left eye.  For almost 2 years now the patient has had episodic sharp pains in the left parieto- occipital area of the head.  They have been fairly infrequent but the patient states that they are more frequent in the last month or so.  She describes this as an episodic sharp pain   Hematological: Negative.    Psychiatric/Behavioral: Negative.        Physical Exam   Constitutional: She is oriented to person, place, and time. She appears well-developed and well-nourished.   Moderately overweight   HENT:   Right Ear: External ear normal.   Left Ear: External ear normal.   Nose: Nose normal.   Mouth/Throat: Oropharynx is clear and moist.   Eyes: Conjunctivae are normal. Pupils are equal, round, and reactive to light.   No nystagmus noted at this time.   Cardiovascular: Normal rate, regular rhythm and normal heart sounds.   Pulmonary/Chest: Effort normal and breath sounds normal. No respiratory distress.  She has no wheezes. She has no rales.   Musculoskeletal: Normal range of motion. She exhibits no edema.   Neurological: She is alert and oriented to person, place, and time. No cranial nerve deficit. Coordination normal.   Skin: Skin is warm and dry.   Psychiatric: She has a normal mood and affect.   Nursing note and vitals reviewed.      ASSESSMENT  #1 recurrent left-sided headaches                     #2 dizziness, episodic                      #3 balance disorder    DISCUSSION/SUMMARY   Initially the patient's blood pressure was somewhat elevated at 150/80 but upon recheck it was down to 138/78.  The patient has been having episodic sharp pains in the left parietal-occipital area of the head for nearly 2 years now.  They seem to be more frequent in the last month or so.  Last week the patient developed dizziness and has had dizziness off and on since then.  The patient states that she feels somewhat unsteady on her feet. at times.  She has had 1 or 2 episodes where she felt the room was spinning.  She states that she has fairly frequent twitching of her left eye.  She denies double vision.    PLAN  The patient should have an MRI to rule out brain pathology.  No Follow-up on file.

## 2019-04-09 ENCOUNTER — TELEPHONE (OUTPATIENT)
Dept: FAMILY MEDICINE CLINIC | Facility: CLINIC | Age: 74
End: 2019-04-09

## 2019-04-09 RX ORDER — DIAZEPAM 5 MG/1
5 TABLET ORAL EVERY 6 HOURS PRN
Qty: 10 TABLET | Refills: 0 | Status: SHIPPED | OUTPATIENT
Start: 2019-04-09 | End: 2019-11-19

## 2019-04-09 NOTE — TELEPHONE ENCOUNTER
PATIENT NOTIFIED THAT THIS IS READY FOR .    ----- Message from Ninoska Gay MA sent at 4/8/2019  4:56 PM EDT -----  Contact: pt  This was ordered but needs to be released so the patient can  tomorrow   ----- Message -----  From: Ubalod Grant MD  Sent: 4/3/2019   8:53 AM  To: Ninoska Gay MA    The patient will have to come  a prescription for diazepam 5 mg tablets #10 1 every 6 hours as needed for anxiety; no refills  ----- Message -----  From: Jossy Coery MA  Sent: 4/3/2019   8:41 AM  To: Ubaldo Grant MD        ----- Message -----  From: Ninoska Gay MA  Sent: 4/2/2019   4:17 PM  To: Jossy Corey MA    SHE IS HAVING AN MRI ON 4/10/19 AND WOULD LIKE AN RX FOR VALIUM TO TAKE PRIOR TO THE TEST PLEASE SEND TO LOCAL PHARMACY ON FILE [id number]

## 2019-04-10 ENCOUNTER — HOSPITAL ENCOUNTER (OUTPATIENT)
Dept: MRI IMAGING | Facility: HOSPITAL | Age: 74
Discharge: HOME | End: 2019-04-10

## 2019-04-10 DIAGNOSIS — R26.89 BALANCE DISORDER: ICD-10-CM

## 2019-04-10 DIAGNOSIS — G89.29 CHRONIC LEFT-SIDED HEADACHES: ICD-10-CM

## 2019-04-10 DIAGNOSIS — R51.9 CHRONIC LEFT-SIDED HEADACHES: ICD-10-CM

## 2019-04-10 DIAGNOSIS — R42 DIZZINESS: ICD-10-CM

## 2019-04-10 PROCEDURE — 70551 MRI BRAIN STEM W/O DYE: CPT

## 2019-04-15 ENCOUNTER — TELEPHONE (OUTPATIENT)
Dept: FAMILY MEDICINE CLINIC | Facility: CLINIC | Age: 74
End: 2019-04-15

## 2019-04-15 NOTE — TELEPHONE ENCOUNTER
Left detailed voicemail for patient with results.     ----- Message from Jossy Corey MA sent at 4/15/2019 12:36 PM EDT -----      ----- Message -----  From: Ubaldo Grant MD  Sent: 4/12/2019  11:54 AM  To: Jossy Corey MA    Tell patient that her brain MRI was normal for her age.  She has very slight hardening of the arteries which is normal for her age group.  Otherwise the test was completely normal.

## 2019-06-04 ENCOUNTER — OFFICE VISIT (OUTPATIENT)
Dept: FAMILY MEDICINE CLINIC | Facility: CLINIC | Age: 74
End: 2019-06-04

## 2019-06-04 VITALS
DIASTOLIC BLOOD PRESSURE: 78 MMHG | SYSTOLIC BLOOD PRESSURE: 138 MMHG | WEIGHT: 167.4 LBS | HEIGHT: 59 IN | HEART RATE: 80 BPM | OXYGEN SATURATION: 98 % | BODY MASS INDEX: 33.75 KG/M2 | RESPIRATION RATE: 16 BRPM

## 2019-06-04 DIAGNOSIS — R05.9 COUGH: ICD-10-CM

## 2019-06-04 DIAGNOSIS — R60.9 EDEMA, UNSPECIFIED TYPE: ICD-10-CM

## 2019-06-04 DIAGNOSIS — H66.90 ACUTE OTITIS MEDIA, UNSPECIFIED OTITIS MEDIA TYPE: Primary | ICD-10-CM

## 2019-06-04 PROBLEM — R21 RASH: Status: ACTIVE | Noted: 2019-06-04

## 2019-06-04 PROCEDURE — 99214 OFFICE O/P EST MOD 30 MIN: CPT | Performed by: INTERNAL MEDICINE

## 2019-06-04 RX ORDER — CEFUROXIME AXETIL 250 MG/1
250 TABLET ORAL 2 TIMES DAILY
Qty: 14 TABLET | Refills: 0 | Status: SHIPPED | OUTPATIENT
Start: 2019-06-04 | End: 2019-06-12

## 2019-06-04 NOTE — PATIENT INSTRUCTIONS
Left eardrum is red and inflamed.  Start cefuroxime 250 mg twice a day until finished.  Suggest elevating legs when at rest and walking and avoiding sodium in the diet.

## 2019-06-04 NOTE — PROGRESS NOTES
Subjective   Jody Simmons is a 73 y.o. female. Patient is here today for   Chief Complaint   Patient presents with   • Sinus Problem     sinus congestion and drainage x 1 week           Vitals:    06/04/19 1352   BP: 138/78   Pulse: 80   Resp: 16   SpO2: 98%     The following portions of the patient's history were reviewed and updated as appropriate: allergies, current medications, past family history, past medical history, past social history, past surgical history and problem list.    Past Medical History:   Diagnosis Date   • Dyslipidemia    • Endometrial polyp 04/21/2015    benign    • Fibroid    • H/O pelvic mass    • Hyperlipidemia    • Osteopenia    • PMB (postmenopausal bleeding)    • UTI (urinary tract infection)       Allergies   Allergen Reactions   • No Known Drug Allergy       Social History     Socioeconomic History   • Marital status: Single     Spouse name: Not on file   • Number of children: Not on file   • Years of education: Not on file   • Highest education level: Not on file   Tobacco Use   • Smoking status: Never Smoker   • Smokeless tobacco: Never Used   Substance and Sexual Activity   • Alcohol use: Yes     Comment: SOCIAL rare   • Drug use: No   • Sexual activity: Yes     Partners: Male     Birth control/protection: Post-menopausal        Current Outpatient Medications:   •  acetaminophen (TYLENOL) 325 MG tablet, Take 650 mg by mouth Every 6 (Six) Hours As Needed for Mild Pain ., Disp: , Rfl:   •  aspirin 81 MG tablet, Take  by mouth., Disp: , Rfl:   •  atorvastatin (LIPITOR) 10 MG tablet, Take 1 tablet by mouth Daily., Disp: 90 tablet, Rfl: 3  •  Calcium Carbonate-Vitamin D (CALTRATE 600+D PO), Take  by mouth., Disp: , Rfl:   •  Cholecalciferol (VITAMIN D) 1000 UNITS tablet, Take  by mouth., Disp: , Rfl:   •  diazePAM (VALIUM) 5 MG tablet, Take 1 tablet by mouth Every 6 (Six) Hours As Needed for Anxiety (anxiety)., Disp: 10 tablet, Rfl: 0  •  fluticasone (CUTIVATE) 0.05 % cream, Apply   topically to the appropriate area as directed 2 (Two) Times a Day., Disp: 30 g, Rfl: 0  •  losartan (COZAAR) 100 MG tablet, Take 1 tablet by mouth Daily., Disp: 90 tablet, Rfl: 3  •  cefuroxime (CEFTIN) 250 MG tablet, Take 1 tablet by mouth 2 (Two) Times a Day., Disp: 14 tablet, Rfl: 0     Objective     History of Present Illness Jody complains of nasal and sinus congestion, postnasal drip, and cough started a week ago.  She was in [place] and returned yesterday.  She denies any fever or shortness of air.  She has taken some over-the-counter product.  While she was on the airplane she could not hear out of her left ear.  She denied any ear pain.  She states that her ear popped and she can now hear.  She also complains of some lower extremity edema.  She was in an air plane for 9 hours.  She denies any calf pain.    Review of Systems   Constitutional: Negative for fever.   HENT: Positive for congestion, hearing loss and sinus pressure.    Respiratory: Positive for cough. Negative for shortness of breath.    Cardiovascular: Positive for leg swelling.   Neurological: Negative.    Psychiatric/Behavioral: Negative.        Physical Exam   Constitutional: She appears well-developed and well-nourished.   HENT:   Right Ear: Tympanic membrane normal.   Left Ear: Tympanic membrane is erythematous and bulging.   Nose: Mucosal edema present.   Pulmonary/Chest: Effort normal. She has no wheezes. She has no rales.   Musculoskeletal: She exhibits edema.   Calves are nontender   Psychiatric: She has a normal mood and affect. Her behavior is normal. Judgment and thought content normal.   Vitals reviewed.      ASSESSMENT     Problem List Items Addressed This Visit        Respiratory    Cough       Nervous and Auditory    Acute otitis media - Primary       Other    Edema          PLAN  Patient Instructions   Left eardrum is red and inflamed.  Start cefuroxime 250 mg twice a day until finished.  Suggest elevating legs when at rest and  walking and avoiding sodium in the diet.    Return for Next scheduled follow up.

## 2019-06-11 ENCOUNTER — TELEPHONE (OUTPATIENT)
Dept: FAMILY MEDICINE CLINIC | Facility: CLINIC | Age: 74
End: 2019-06-11

## 2019-06-11 NOTE — TELEPHONE ENCOUNTER
SPOKE TO DR. DOW AND HE ADVISED HER TO BE SEEN AGAIN.  SHE EXPRESSED UNDERSTANDING AND AN APPOINTMENT WAS MADE FOR HER,.  ----- Message from Florina Castro sent at 6/11/2019  8:42 AM EDT -----  SAW DR STONE FOR HER LEFT EAR LAST TUES AND GOT A ANTIBIOTIC CEFUROXIME 250MG  I TAB 2 TIMES A DAY  AND SHE IS WANTING TO KNOW IF ANOTHER ROUND OF ANTIBIOTIC CAN BE CALLED IN FOR HER SAYS HER EAR IS STILL REALLY BAD ,     IF HE WONT CALL IN AN ANTIBIOTIC PLEASE CALL PT TO LET HER KNOW IF SHE NEEDS AN APPT      Daisetta  [phone]     CELL [phone]      SHE HAS 1 TAB LEFT     PHARMACY   [facility] ON [address]

## 2019-06-12 ENCOUNTER — OFFICE VISIT (OUTPATIENT)
Dept: FAMILY MEDICINE CLINIC | Facility: CLINIC | Age: 74
End: 2019-06-12

## 2019-06-12 VITALS
RESPIRATION RATE: 16 BRPM | WEIGHT: 166 LBS | HEART RATE: 76 BPM | HEIGHT: 59 IN | BODY MASS INDEX: 33.47 KG/M2 | OXYGEN SATURATION: 99 % | DIASTOLIC BLOOD PRESSURE: 80 MMHG | SYSTOLIC BLOOD PRESSURE: 134 MMHG | TEMPERATURE: 98 F

## 2019-06-12 DIAGNOSIS — H65.02 ACUTE SEROUS OTITIS MEDIA OF LEFT EAR, RECURRENCE NOT SPECIFIED: Primary | ICD-10-CM

## 2019-06-12 PROBLEM — H65.90 SEROUS OTITIS MEDIA: Status: ACTIVE | Noted: 2019-06-12

## 2019-06-12 PROCEDURE — 99213 OFFICE O/P EST LOW 20 MIN: CPT | Performed by: INTERNAL MEDICINE

## 2019-06-12 RX ORDER — METHYLPREDNISOLONE 4 MG/1
TABLET ORAL
Qty: 21 TABLET | Refills: 0 | Status: SHIPPED | OUTPATIENT
Start: 2019-06-12 | End: 2019-06-26

## 2019-06-12 RX ORDER — FLUTICASONE PROPIONATE 50 MCG
2 SPRAY, SUSPENSION (ML) NASAL DAILY
Qty: 18.2 G | Refills: 1 | Status: SHIPPED | OUTPATIENT
Start: 2019-06-12 | End: 2020-11-16

## 2019-06-12 NOTE — PROGRESS NOTES
Subjective   Jody Simmons is a 73 y.o. female. Patient is here today for   Chief Complaint   Patient presents with   • Sinus Problem     sinus congestion and drainage-still having some left ear pain           Vitals:    06/12/19 0926   BP: 134/80   Pulse: 76   Resp: 16   Temp: 98 °F (36.7 °C)   SpO2: 99%     The following portions of the patient's history were reviewed and updated as appropriate: allergies, current medications, past family history, past medical history, past social history, past surgical history and problem list.    Past Medical History:   Diagnosis Date   • Dyslipidemia    • Endometrial polyp 04/21/2015    benign    • Fibroid    • H/O pelvic mass    • Hyperlipidemia    • Osteopenia    • PMB (postmenopausal bleeding)    • UTI (urinary tract infection)       Allergies   Allergen Reactions   • No Known Drug Allergy       Social History     Socioeconomic History   • Marital status: Single     Spouse name: Not on file   • Number of children: Not on file   • Years of education: Not on file   • Highest education level: Not on file   Tobacco Use   • Smoking status: Never Smoker   • Smokeless tobacco: Never Used   Substance and Sexual Activity   • Alcohol use: Yes     Comment: SOCIAL rare   • Drug use: No   • Sexual activity: Yes     Partners: Male     Birth control/protection: Post-menopausal        Current Outpatient Medications:   •  acetaminophen (TYLENOL) 325 MG tablet, Take 650 mg by mouth Every 6 (Six) Hours As Needed for Mild Pain ., Disp: , Rfl:   •  aspirin 81 MG tablet, Take  by mouth., Disp: , Rfl:   •  atorvastatin (LIPITOR) 10 MG tablet, Take 1 tablet by mouth Daily., Disp: 90 tablet, Rfl: 3  •  Calcium Carbonate-Vitamin D (CALTRATE 600+D PO), Take  by mouth., Disp: , Rfl:   •  Cholecalciferol (VITAMIN D) 1000 UNITS tablet, Take  by mouth., Disp: , Rfl:   •  diazePAM (VALIUM) 5 MG tablet, Take 1 tablet by mouth Every 6 (Six) Hours As Needed for Anxiety (anxiety)., Disp: 10 tablet, Rfl: 0  •   fluticasone (CUTIVATE) 0.05 % cream, Apply  topically to the appropriate area as directed 2 (Two) Times a Day., Disp: 30 g, Rfl: 0  •  losartan (COZAAR) 100 MG tablet, Take 1 tablet by mouth Daily., Disp: 90 tablet, Rfl: 3  •  fluticasone (FLONASE) 50 MCG/ACT nasal spray, 2 sprays into the nostril(s) as directed by provider Daily., Disp: 18.2 g, Rfl: 1  •  methylPREDNISolone (MEDROL, NATALIE,) 4 MG tablet, Take as directed on package instructions., Disp: 21 tablet, Rfl: 0     Objective     History of Present Illness Jody was seen 10 days ago and diagnosed with otitis media.  She had been in [place] and developed an upper respiratory infection.  She was placed on cefuroxime and does feel somewhat better.  She continues to have decreased hearing in her left ear and some postnasal drip and cough.  She denies any fever.    Review of Systems   Constitutional: Negative for fever.   HENT: Positive for postnasal drip. Negative for ear pain.    Respiratory: Positive for cough.    Neurological: Negative for headaches.   Psychiatric/Behavioral: Negative.        Physical Exam   Constitutional: She appears well-developed and well-nourished.   HENT:   Right Ear: Tympanic membrane is not erythematous. No middle ear effusion.   Left Ear: Tympanic membrane is not erythematous. A middle ear effusion is present.   Nose: Mucosal edema present.   Mouth/Throat: Oropharynx is clear and moist.   Pulmonary/Chest: Effort normal and breath sounds normal.   Psychiatric: She has a normal mood and affect. Her behavior is normal. Judgment and thought content normal.   Vitals reviewed.      ASSESSMENT     Problem List Items Addressed This Visit        Nervous and Auditory    Serous otitis media - Primary          PLAN  Patient Instructions   Left eardrum is no longer inflamed.  There is fluid behind her eardrum.  Start fluticasone nasal spray at 1 puff twice a day in each nostril and take Medrol Dosepak as instructed.    No Follow-up on file.

## 2019-06-12 NOTE — PATIENT INSTRUCTIONS
Left eardrum is no longer inflamed.  There is fluid behind her eardrum.  Start fluticasone nasal spray at 1 puff twice a day in each nostril and take Medrol Dosepak as instructed.

## 2019-06-20 DIAGNOSIS — E55.9 MILD VITAMIN D DEFICIENCY: ICD-10-CM

## 2019-06-20 DIAGNOSIS — E78.2 MIXED HYPERLIPIDEMIA: ICD-10-CM

## 2019-06-20 DIAGNOSIS — R73.9 BLOOD GLUCOSE ELEVATED: ICD-10-CM

## 2019-06-24 LAB
25(OH)D3+25(OH)D2 SERPL-MCNC: 39.4 NG/ML (ref 30–100)
ALBUMIN SERPL-MCNC: 4.3 G/DL (ref 3.5–5.2)
ALBUMIN/GLOB SERPL: 2 G/DL
ALP SERPL-CCNC: 127 U/L (ref 39–117)
ALT SERPL-CCNC: 31 U/L (ref 1–33)
AST SERPL-CCNC: 21 U/L (ref 1–32)
BILIRUB SERPL-MCNC: 0.4 MG/DL (ref 0.2–1.2)
BUN SERPL-MCNC: 14 MG/DL (ref 8–23)
BUN/CREAT SERPL: 15.9 (ref 7–25)
CALCIUM SERPL-MCNC: 9.2 MG/DL (ref 8.6–10.5)
CHLORIDE SERPL-SCNC: 104 MMOL/L (ref 98–107)
CHOLEST SERPL-MCNC: 198 MG/DL (ref 0–200)
CO2 SERPL-SCNC: 27.6 MMOL/L (ref 22–29)
CREAT SERPL-MCNC: 0.88 MG/DL (ref 0.57–1)
GLOBULIN SER CALC-MCNC: 2.2 GM/DL
GLUCOSE SERPL-MCNC: 121 MG/DL (ref 65–99)
HBA1C MFR BLD: 6.4 % (ref 4.8–5.6)
HDLC SERPL-MCNC: 50 MG/DL (ref 40–60)
LDLC SERPL CALC-MCNC: 117 MG/DL (ref 0–100)
LDLC/HDLC SERPL: 2.34 {RATIO}
POTASSIUM SERPL-SCNC: 4.7 MMOL/L (ref 3.5–5.2)
PROT SERPL-MCNC: 6.5 G/DL (ref 6–8.5)
SODIUM SERPL-SCNC: 142 MMOL/L (ref 136–145)
TRIGL SERPL-MCNC: 156 MG/DL (ref 0–150)
VLDLC SERPL CALC-MCNC: 31.2 MG/DL

## 2019-06-26 ENCOUNTER — OFFICE VISIT (OUTPATIENT)
Dept: FAMILY MEDICINE CLINIC | Facility: CLINIC | Age: 74
End: 2019-06-26

## 2019-06-26 VITALS
OXYGEN SATURATION: 98 % | RESPIRATION RATE: 16 BRPM | HEART RATE: 76 BPM | TEMPERATURE: 97.9 F | DIASTOLIC BLOOD PRESSURE: 76 MMHG | SYSTOLIC BLOOD PRESSURE: 136 MMHG | HEIGHT: 59 IN | BODY MASS INDEX: 33.87 KG/M2 | WEIGHT: 168 LBS

## 2019-06-26 DIAGNOSIS — J30.89 SEASONAL ALLERGIC RHINITIS DUE TO OTHER ALLERGIC TRIGGER: ICD-10-CM

## 2019-06-26 DIAGNOSIS — H92.02 LEFT EAR PAIN: Primary | ICD-10-CM

## 2019-06-26 DIAGNOSIS — K64.9 HEMORRHOIDS, UNSPECIFIED HEMORRHOID TYPE: ICD-10-CM

## 2019-06-26 PROCEDURE — 99214 OFFICE O/P EST MOD 30 MIN: CPT | Performed by: NURSE PRACTITIONER

## 2019-06-26 NOTE — PROGRESS NOTES
Subjective   Jody Simomns is a 73 y.o. female.   Chief Complaint   Patient presents with   • Earache     left ear pain and sore throat    • Sore Throat     Vitals:    06/26/19 1422   BP: 136/76   Pulse: 76   Resp: 16   Temp: 97.9 °F (36.6 °C)   SpO2: 98%     No LMP recorded. Patient has had a hysterectomy.    History of Present Illness Jody is a patient of Dr. Grant who is here for follow-up from urgent care for hemorrhoids.  He is also here for a follow-up for left ear pain and sore throat  She went to the urgent care on 6/21 with rectal bleeding and they gave her triamcinolone cream for a hemorrhoid which she did not take.  She took preparation H instead.  Denies any rectal pain but has a small amount of blood with wiping.  She seen Dr. Escobar twice over the last 4 weeks for left ear pain.  She was given Ceftin Medrol Dosepak and fluticasone.  Continues to have occasional left ear pain and pressure.  She also complains of a sore throat  Reviewed all of her records from her previous visits with Dr. Escobar and the records from the urgent care in [facility]    The following portions of the patient's history were reviewed and updated as appropriate: allergies, current medications, past family history, past medical history, past social history, past surgical history and problem list.    Review of Systems   Constitutional: Negative for chills, fatigue and fever.   HENT: Positive for congestion, ear pain and sore throat. Negative for ear discharge and tinnitus.    Respiratory: Negative.    Cardiovascular: Negative.    Gastrointestinal: Positive for anal bleeding.   Musculoskeletal: Negative.        Objective   Physical Exam   Constitutional: Vital signs are normal. She appears well-developed and well-nourished. She does not appear ill. No distress.   HENT:   Head: Normocephalic.   Right Ear: Tympanic membrane and ear canal normal.   Left Ear: Tympanic membrane and ear canal normal.   Nose: Nose normal.   Mouth/Throat:  Uvula is midline. Posterior oropharyngeal erythema present.   She has an open area that looks like irritation at the roof of her mouth   Cardiovascular: Normal rate and regular rhythm.   Pulmonary/Chest: Effort normal and breath sounds normal.   Genitourinary: Rectal exam shows external hemorrhoid.   Neurological: She is alert.       Assessment/Plan   Jody was seen today for earache and sore throat.    Diagnoses and all orders for this visit:    Left ear pain    Seasonal allergic rhinitis due to other allergic trigger    Hemorrhoids, unspecified hemorrhoid type      1 left ear pain-there is no evidence of acute otitis media, likely from eustachian tube dysfunction, continue Flonase  2.  Sore throat recommend salt water gargles  3.  Allergic rhinitis-Flonase  4.  For hemorrhoids okay to continue using Preparation H, avoid straining, recommend sitz baths, and using Tucks pads after each bowel movement  I spent over 25 minutes with the patient examining her and discussing her concerns, and reviewing her previous records  Follow Up if symptoms persist, worsen or, if new symptoms develop

## 2019-07-18 ENCOUNTER — OFFICE VISIT (OUTPATIENT)
Dept: FAMILY MEDICINE CLINIC | Facility: CLINIC | Age: 74
End: 2019-07-18

## 2019-07-18 VITALS
DIASTOLIC BLOOD PRESSURE: 70 MMHG | WEIGHT: 165.8 LBS | RESPIRATION RATE: 16 BRPM | TEMPERATURE: 98.1 F | BODY MASS INDEX: 33.43 KG/M2 | OXYGEN SATURATION: 98 % | SYSTOLIC BLOOD PRESSURE: 126 MMHG | HEART RATE: 90 BPM | HEIGHT: 59 IN

## 2019-07-18 DIAGNOSIS — E78.2 MIXED HYPERLIPIDEMIA: ICD-10-CM

## 2019-07-18 DIAGNOSIS — I10 ESSENTIAL HYPERTENSION: Primary | ICD-10-CM

## 2019-07-18 PROCEDURE — 99213 OFFICE O/P EST LOW 20 MIN: CPT | Performed by: INTERNAL MEDICINE

## 2019-07-18 NOTE — PROGRESS NOTES
Subjective   Jody Simmons is a 73 y.o. female. Patient is here today for   Chief Complaint   Patient presents with   • Follow-up     hld          Vitals:    07/18/19 1305   BP: 126/70   Pulse: 90   Resp: 16   Temp: 98.1 °F (36.7 °C)   SpO2: 98%       Past Medical History:   Diagnosis Date   • Dyslipidemia    • Endometrial polyp 04/21/2015    benign    • Fibroid    • H/O pelvic mass    • Hyperlipidemia    • Osteopenia    • PMB (postmenopausal bleeding)    • UTI (urinary tract infection)       Allergies   Allergen Reactions   • No Known Drug Allergy       Social History     Socioeconomic History   • Marital status: Single     Spouse name: Not on file   • Number of children: Not on file   • Years of education: Not on file   • Highest education level: Not on file   Tobacco Use   • Smoking status: Never Smoker   • Smokeless tobacco: Never Used   Substance and Sexual Activity   • Alcohol use: Yes     Comment: SOCIAL rare   • Drug use: No   • Sexual activity: Yes     Partners: Male     Birth control/protection: Post-menopausal        Current Outpatient Medications:   •  acetaminophen (TYLENOL) 325 MG tablet, Take 650 mg by mouth Every 6 (Six) Hours As Needed for Mild Pain ., Disp: , Rfl:   •  aspirin 81 MG tablet, Take  by mouth., Disp: , Rfl:   •  atorvastatin (LIPITOR) 10 MG tablet, Take 1 tablet by mouth Daily., Disp: 90 tablet, Rfl: 3  •  Calcium Carbonate-Vitamin D (CALTRATE 600+D PO), Take  by mouth., Disp: , Rfl:   •  Cholecalciferol (VITAMIN D) 1000 UNITS tablet, Take  by mouth., Disp: , Rfl:   •  diazePAM (VALIUM) 5 MG tablet, Take 1 tablet by mouth Every 6 (Six) Hours As Needed for Anxiety (anxiety)., Disp: 10 tablet, Rfl: 0  •  fluticasone (CUTIVATE) 0.05 % cream, Apply  topically to the appropriate area as directed 2 (Two) Times a Day., Disp: 30 g, Rfl: 0  •  fluticasone (FLONASE) 50 MCG/ACT nasal spray, 2 sprays into the nostril(s) as directed by provider Daily., Disp: 18.2 g, Rfl: 1  •  losartan (COZAAR) 100  MG tablet, Take 1 tablet by mouth Daily., Disp: 90 tablet, Rfl: 3     Objective     She is here to follow-up on hypercholesterolemia.         Review of Systems   Constitutional: Negative.    HENT: Negative.    Respiratory: Negative.    Cardiovascular: Negative.    Musculoskeletal: Negative.    Psychiatric/Behavioral: Negative.        Physical Exam   Constitutional: She is oriented to person, place, and time. She appears well-developed and well-nourished.   HENT:   Head: Normocephalic and atraumatic.   Cardiovascular: Normal rate, regular rhythm and normal heart sounds.   Pulmonary/Chest: Effort normal and breath sounds normal.   Neurological: She is alert and oriented to person, place, and time.   Psychiatric: She has a normal mood and affect. Her behavior is normal. Thought content normal.   Nursing note and vitals reviewed.        Problem List Items Addressed This Visit        Cardiovascular and Mediastinum    Mixed hyperlipidemia    Essential hypertension - Primary            PLAN  She and I reviewed her labs.  She has good control of her hypertension.    She has good control of her hypercholesterolemia on atorvastatin 10 mg daily.    She should follow-up in 3 to 4 months.    She ought to have a Medicare wellness visit once yearly.  No Follow-up on file.

## 2019-09-30 RX ORDER — LOSARTAN POTASSIUM 100 MG/1
100 TABLET ORAL DAILY
Qty: 90 TABLET | Refills: 0 | Status: SHIPPED | OUTPATIENT
Start: 2019-09-30 | End: 2019-12-16 | Stop reason: SDUPTHER

## 2019-10-03 RX ORDER — ATORVASTATIN CALCIUM 10 MG/1
TABLET, FILM COATED ORAL
Qty: 90 TABLET | Refills: 0 | Status: SHIPPED | OUTPATIENT
Start: 2019-10-03 | End: 2019-12-16 | Stop reason: SDUPTHER

## 2019-11-01 DIAGNOSIS — R73.9 BLOOD GLUCOSE ELEVATED: ICD-10-CM

## 2019-11-01 DIAGNOSIS — E78.2 MIXED HYPERLIPIDEMIA: ICD-10-CM

## 2019-11-05 LAB
ALBUMIN SERPL-MCNC: 4.5 G/DL (ref 3.5–4.8)
ALBUMIN/GLOB SERPL: 2.1 {RATIO} (ref 1.2–2.2)
ALP SERPL-CCNC: 127 IU/L (ref 39–117)
ALT SERPL-CCNC: 25 IU/L (ref 0–32)
AST SERPL-CCNC: 23 IU/L (ref 0–40)
BASOPHILS # BLD AUTO: 0 X10E3/UL (ref 0–0.2)
BASOPHILS NFR BLD AUTO: 0 %
BILIRUB SERPL-MCNC: 0.4 MG/DL (ref 0–1.2)
BUN SERPL-MCNC: 14 MG/DL (ref 8–27)
BUN/CREAT SERPL: 16 (ref 12–28)
CALCIUM SERPL-MCNC: 9.5 MG/DL (ref 8.7–10.3)
CHLORIDE SERPL-SCNC: 104 MMOL/L (ref 96–106)
CHOLEST SERPL-MCNC: 154 MG/DL (ref 100–199)
CO2 SERPL-SCNC: 21 MMOL/L (ref 20–29)
CREAT SERPL-MCNC: 0.9 MG/DL (ref 0.57–1)
EOSINOPHIL # BLD AUTO: 0.2 X10E3/UL (ref 0–0.4)
EOSINOPHIL NFR BLD AUTO: 3 %
ERYTHROCYTE [DISTWIDTH] IN BLOOD BY AUTOMATED COUNT: 13.4 % (ref 12.3–15.4)
GLOBULIN SER CALC-MCNC: 2.1 G/DL (ref 1.5–4.5)
GLUCOSE SERPL-MCNC: 105 MG/DL (ref 65–99)
HBA1C MFR BLD: 6 % (ref 4.8–5.6)
HCT VFR BLD AUTO: 43.6 % (ref 34–46.6)
HDLC SERPL-MCNC: 43 MG/DL
HGB BLD-MCNC: 14.8 G/DL (ref 11.1–15.9)
IMM GRANULOCYTES # BLD AUTO: 0 X10E3/UL (ref 0–0.1)
IMM GRANULOCYTES NFR BLD AUTO: 0 %
LDLC SERPL CALC-MCNC: 86 MG/DL (ref 0–99)
LDLC/HDLC SERPL: 2 RATIO (ref 0–3.2)
LYMPHOCYTES # BLD AUTO: 1.9 X10E3/UL (ref 0.7–3.1)
LYMPHOCYTES NFR BLD AUTO: 27 %
MCH RBC QN AUTO: 32.7 PG (ref 26.6–33)
MCHC RBC AUTO-ENTMCNC: 33.9 G/DL (ref 31.5–35.7)
MCV RBC AUTO: 96 FL (ref 79–97)
MONOCYTES # BLD AUTO: 0.5 X10E3/UL (ref 0.1–0.9)
MONOCYTES NFR BLD AUTO: 7 %
NEUTROPHILS # BLD AUTO: 4.5 X10E3/UL (ref 1.4–7)
NEUTROPHILS NFR BLD AUTO: 63 %
PLATELET # BLD AUTO: 219 X10E3/UL (ref 150–450)
POTASSIUM SERPL-SCNC: 4.3 MMOL/L (ref 3.5–5.2)
PROT SERPL-MCNC: 6.6 G/DL (ref 6–8.5)
RBC # BLD AUTO: 4.53 X10E6/UL (ref 3.77–5.28)
SODIUM SERPL-SCNC: 142 MMOL/L (ref 134–144)
TRIGL SERPL-MCNC: 127 MG/DL (ref 0–149)
VLDLC SERPL CALC-MCNC: 25 MG/DL (ref 5–40)
WBC # BLD AUTO: 7.2 X10E3/UL (ref 3.4–10.8)

## 2019-11-19 ENCOUNTER — OFFICE VISIT (OUTPATIENT)
Dept: FAMILY MEDICINE CLINIC | Facility: CLINIC | Age: 74
End: 2019-11-19

## 2019-11-19 VITALS
DIASTOLIC BLOOD PRESSURE: 82 MMHG | HEART RATE: 76 BPM | RESPIRATION RATE: 18 BRPM | TEMPERATURE: 97.7 F | BODY MASS INDEX: 32.9 KG/M2 | SYSTOLIC BLOOD PRESSURE: 116 MMHG | HEIGHT: 59 IN | OXYGEN SATURATION: 98 % | WEIGHT: 163.2 LBS

## 2019-11-19 DIAGNOSIS — E78.2 MIXED HYPERLIPIDEMIA: Primary | ICD-10-CM

## 2019-11-19 DIAGNOSIS — R73.9 HYPERGLYCEMIA: ICD-10-CM

## 2019-11-19 DIAGNOSIS — I10 ESSENTIAL HYPERTENSION: ICD-10-CM

## 2019-11-19 DIAGNOSIS — E66.9 OBESITY (BMI 30.0-34.9): ICD-10-CM

## 2019-11-19 PROCEDURE — 99214 OFFICE O/P EST MOD 30 MIN: CPT | Performed by: INTERNAL MEDICINE

## 2019-12-01 PROBLEM — E66.811 OBESITY (BMI 30.0-34.9): Status: ACTIVE | Noted: 2019-12-01

## 2019-12-01 PROBLEM — E66.9 OBESITY (BMI 30.0-34.9): Status: ACTIVE | Noted: 2019-12-01

## 2019-12-01 NOTE — PROGRESS NOTES
Subjective   Jody Simmons is a 73 y.o. female. Patient is here today for No chief complaint on file.         Vitals:    11/19/19 0957   BP: 116/82   Pulse: 76   Resp: 18   Temp: 97.7 °F (36.5 °C)   SpO2: 98%     Body mass index is 32.94 kg/m².      Past Medical History:   Diagnosis Date   • Dyslipidemia    • Endometrial polyp 04/21/2015    benign    • Fibroid    • H/O pelvic mass    • Hyperlipidemia    • Osteopenia    • PMB (postmenopausal bleeding)    • UTI (urinary tract infection)       Allergies   Allergen Reactions   • No Known Drug Allergy       Social History     Socioeconomic History   • Marital status: Single     Spouse name: Not on file   • Number of children: Not on file   • Years of education: Not on file   • Highest education level: Not on file   Tobacco Use   • Smoking status: Never Smoker   • Smokeless tobacco: Never Used   Substance and Sexual Activity   • Alcohol use: Yes     Comment: SOCIAL rare   • Drug use: No   • Sexual activity: Yes     Partners: Male     Birth control/protection: Post-menopausal        Current Outpatient Medications:   •  acetaminophen (TYLENOL) 325 MG tablet, Take 650 mg by mouth Every 6 (Six) Hours As Needed for Mild Pain ., Disp: , Rfl:   •  aspirin 81 MG tablet, Take  by mouth., Disp: , Rfl:   •  atorvastatin (LIPITOR) 10 MG tablet, TAKE 1 TABLET DAILY, Disp: 90 tablet, Rfl: 0  •  Calcium Carbonate-Vitamin D (CALTRATE 600+D PO), Take  by mouth., Disp: , Rfl:   •  Cholecalciferol (VITAMIN D) 1000 UNITS tablet, Take  by mouth., Disp: , Rfl:   •  fluticasone (CUTIVATE) 0.05 % cream, Apply  topically to the appropriate area as directed 2 (Two) Times a Day., Disp: 30 g, Rfl: 0  •  fluticasone (FLONASE) 50 MCG/ACT nasal spray, 2 sprays into the nostril(s) as directed by provider Daily., Disp: 18.2 g, Rfl: 1  •  losartan (COZAAR) 100 MG tablet, Take 1 tablet by mouth Daily., Disp: 90 tablet, Rfl: 0     Objective     Here to review labs done last week.  She has no complaints.          Review of Systems   Constitutional: Negative.    HENT: Negative.    Respiratory: Negative.    Cardiovascular: Negative.    Musculoskeletal: Negative.    Psychiatric/Behavioral: Negative.        Physical Exam   Constitutional: She is oriented to person, place, and time. She appears well-developed and well-nourished.   Pleasant, neatly groomed, BMI 32.9.   HENT:   Head: Normocephalic.   Cardiovascular: Normal rate.   Pulmonary/Chest: Effort normal.   Neurological: She is alert and oriented to person, place, and time.   Psychiatric: She has a normal mood and affect. Her behavior is normal.   Nursing note and vitals reviewed.        Problem List Items Addressed This Visit        Cardiovascular and Mediastinum    Mixed hyperlipidemia - Primary    Essential hypertension       Other    Hyperglycemia    Obesity (BMI 30.0-34.9)            PLAN  She and I reviewed her labs.  Her hypercholesterolemia is well controlled on atorvastatin 10 mg daily.    Her hypertension is well controlled.    She is obese with a BMI of 32.9 and she has hyperglycemia.  She and I discussed the need for weight loss via regular aerobic activity per AHA guidelines and decrease caloric intake.    I asked her to follow-up in about 6 months.    She should follow-up for a Medicare wellness visit once yearly.  No Follow-up on file.

## 2019-12-17 RX ORDER — LOSARTAN POTASSIUM 100 MG/1
TABLET ORAL
Qty: 90 TABLET | Refills: 4 | Status: SHIPPED | OUTPATIENT
Start: 2019-12-17 | End: 2021-01-21 | Stop reason: SDUPTHER

## 2019-12-17 RX ORDER — ATORVASTATIN CALCIUM 10 MG/1
TABLET, FILM COATED ORAL
Qty: 90 TABLET | Refills: 4 | Status: SHIPPED | OUTPATIENT
Start: 2019-12-17 | End: 2021-01-21 | Stop reason: SDUPTHER

## 2019-12-26 ENCOUNTER — OFFICE VISIT (OUTPATIENT)
Dept: FAMILY MEDICINE CLINIC | Facility: CLINIC | Age: 74
End: 2019-12-26

## 2019-12-26 VITALS
HEART RATE: 75 BPM | WEIGHT: 167 LBS | DIASTOLIC BLOOD PRESSURE: 70 MMHG | BODY MASS INDEX: 33.67 KG/M2 | TEMPERATURE: 97.3 F | SYSTOLIC BLOOD PRESSURE: 120 MMHG | RESPIRATION RATE: 17 BRPM | HEIGHT: 59 IN | OXYGEN SATURATION: 97 %

## 2019-12-26 DIAGNOSIS — Z00.00 MEDICARE ANNUAL WELLNESS VISIT, SUBSEQUENT: Primary | ICD-10-CM

## 2019-12-26 PROCEDURE — G0439 PPPS, SUBSEQ VISIT: HCPCS | Performed by: NURSE PRACTITIONER

## 2019-12-26 NOTE — PROGRESS NOTES
The ABCs of the Annual Wellness Visit  Subsequent Medicare Wellness Visit    Chief Complaint   Patient presents with   • Medicare Wellness-subsequent       History of Present Illness:  Jody Simmons is a 74 y.o. female who presents for a Subsequent Medicare Wellness Visit.  Subjective   HEALTH RISK ASSESSMENT    Recent Hospitalizations:  No hospitalization(s) within the last year.    Current Medical Providers:  Patient Care Team:  Manolo Reddy MD as PCP - General (Internal Medicine)    Smoking Status:  Social History     Tobacco Use   Smoking Status Never Smoker   Smokeless Tobacco Never Used       Alcohol Consumption:  Social History     Substance and Sexual Activity   Alcohol Use Yes    Comment: SOCIAL rare       Depression Screen:   PHQ-2/PHQ-9 Depression Screening 12/17/2018   Little interest or pleasure in doing things 0   Feeling down, depressed, or hopeless 0   Total Score 0       Fall Risk Screen:  STEADI Fall Risk Assessment was completed, and patient is at LOW risk for falls.Assessment completed on:12/26/2019    Health Habits and Functional and Cognitive Screening:  Functional & Cognitive Status 12/26/2019   Do you have difficulty preparing food and eating? No   Do you have difficulty bathing yourself, getting dressed or grooming yourself? No   Do you have difficulty using the toilet? No   Do you have difficulty moving around from place to place? No   Do you have trouble with steps or getting out of a bed or a chair? No   Current Diet Well Balanced Diet   Dental Exam Up to date   Eye Exam Up to date   Exercise (times per week) 4 times per week   Current Exercise Activities Include Housecleaning   Do you need help using the phone?  No   Are you deaf or do you have serious difficulty hearing?  No   Do you need help with transportation? No   Do you need help shopping? No   Do you need help preparing meals?  No   Do you need help with housework?  No   Do you need help with laundry? No   Do you need help  taking your medications? No   Do you need help managing money? No   Do you ever drive or ride in a car without wearing a seat belt? No   Have you felt unusual stress, anger or loneliness in the last month? -   Who do you live with? -   If you need help, do you have trouble finding someone available to you? -   Have you been bothered in the last four weeks by sexual problems? -   Do you have difficulty concentrating, remembering or making decisions? -       Does the patient have evidence of cognitive impairment? No    Asprin use counseling:Taking ASA appropriately as indicated    Age-appropriate Screening Schedule:  Refer to the list below for future screening recommendations based on patient's age, sex and/or medical conditions. Orders for these recommended tests are listed in the plan section. The patient has been provided with a written plan.    Health Maintenance   Topic Date Due   • ZOSTER VACCINE (2 of 2) 05/22/2018   • MAMMOGRAM  08/22/2019   • DXA SCAN  03/16/2020   • LIPID PANEL  11/04/2020   • COLONOSCOPY  04/03/2028   • TDAP/TD VACCINES (2 - Td) 04/18/2029   • INFLUENZA VACCINE  Completed   • PNEUMOCOCCAL VACCINES (65+ LOW/MEDIUM RISK)  Completed          The following portions of the patient's history were reviewed and updated as appropriate: allergies, current medications, past family history, past medical history, past social history, past surgical history and problem list.    Outpatient Medications Prior to Visit   Medication Sig Dispense Refill   • acetaminophen (TYLENOL) 325 MG tablet Take 650 mg by mouth Every 6 (Six) Hours As Needed for Mild Pain .     • aspirin 81 MG tablet Take  by mouth.     • atorvastatin (LIPITOR) 10 MG tablet TAKE 1 TABLET DAILY 90 tablet 4   • Calcium Carbonate-Vitamin D (CALTRATE 600+D PO) Take  by mouth.     • Cholecalciferol (VITAMIN D) 1000 UNITS tablet Take  by mouth.     • fluticasone (CUTIVATE) 0.05 % cream Apply  topically to the appropriate area as directed 2 (Two)  "Times a Day. 30 g 0   • fluticasone (FLONASE) 50 MCG/ACT nasal spray 2 sprays into the nostril(s) as directed by provider Daily. 18.2 g 1   • losartan (COZAAR) 100 MG tablet TAKE 1 TABLET DAILY 90 tablet 4     No facility-administered medications prior to visit.        Patient Active Problem List   Diagnosis   • Hyperglycemia   • Mixed hyperlipidemia   • Osteopenia   • Mild vitamin D deficiency   • Dysesthesia   • Essential hypertension   • Chronic left-sided headaches   • Dizziness   • Balance disorder   • Acute otitis media   • Cough   • Rash   • Edema   • Serous otitis media   • Obesity (BMI 30.0-34.9)       Advanced Care Planning:  Patient has an advance directive - a copy has been provided and is visible in patient header    Review of Systems   Constitutional: Negative.    HENT: Negative.    Respiratory: Negative.    Cardiovascular: Negative.    Gastrointestinal: Negative.        Compared to one year ago, the patient feels her physical health is better.  Compared to one year ago, the patient feels her mental health is better.    Reviewed chart for potential of high risk medication in the elderly: yes  Reviewed chart for potential of harmful drug interactions in the elderly:yes    Objective         Vitals:    12/26/19 1052   BP: 120/70   Pulse: 75   Resp: 17   Temp: 97.3 °F (36.3 °C)   SpO2: 97%   Weight: 75.8 kg (167 lb)   Height: 149.9 cm (59\")       Body mass index is 33.73 kg/m².  Discussed the patient's BMI with her. The BMI is above average; BMI management plan is completed.    Physical Exam   Constitutional: She is oriented to person, place, and time. She appears well-developed and well-nourished.   HENT:   Head: Normocephalic and atraumatic.   Eyes: Pupils are equal, round, and reactive to light.   Cardiovascular: Normal rate and regular rhythm.   Pulmonary/Chest: Effort normal and breath sounds normal.   Musculoskeletal: Normal range of motion.   Tug test 10 seconds   Neurological: She is alert and " oriented to person, place, and time.   Psychiatric: Cognition and memory are not impaired (mini cog wnl).   Vitals reviewed.      Lab Results   Component Value Date     (H) 11/04/2019    CHLPL 154 11/04/2019    TRIG 127 11/04/2019    HDL 43 11/04/2019    LDL 86 11/04/2019    VLDL 25 11/04/2019    HGBA1C 6.0 (H) 11/04/2019        Assessment/Plan   Medicare Risks and Personalized Health Plan  CMS Preventative Services Quick Reference  Breast Cancer/Mammogram Screening  Immunizations Discussed/Encouraged (specific immunizations; Shingrix )  Obesity/Overweight     The above risks/problems have been discussed with the patient.  Pertinent information has been shared with the patient in the After Visit Summary.  Follow up plans and orders are seen below in the Assessment/Plan Section.    Diagnoses and all orders for this visit:    1. Medicare annual wellness visit, subsequent (Primary)      Vascular studies done 12/2/19  Mammogram--pt states she will schedule in 2020  Shingles vaccine--will go to local pharmacy and get  Dexa scan--plans to get in 2020  Labs done 11/2019  Cologaurd done 2018, negative    Follow Up:  Return for follow up with Dr. Reddy as needed/as recommended.     An After Visit Summary and PPPS were given to the patient.

## 2020-06-12 DIAGNOSIS — I10 ESSENTIAL HYPERTENSION: ICD-10-CM

## 2020-06-12 DIAGNOSIS — E55.9 MILD VITAMIN D DEFICIENCY: ICD-10-CM

## 2020-06-12 DIAGNOSIS — E78.2 MIXED HYPERLIPIDEMIA: Primary | ICD-10-CM

## 2020-06-25 LAB
25(OH)D3+25(OH)D2 SERPL-MCNC: 56.3 NG/ML (ref 30–100)
ALBUMIN SERPL-MCNC: 4.6 G/DL (ref 3.5–5.2)
ALBUMIN/GLOB SERPL: 1.9 G/DL
ALP SERPL-CCNC: 128 U/L (ref 39–117)
ALT SERPL-CCNC: 19 U/L (ref 1–33)
AST SERPL-CCNC: 13 U/L (ref 1–32)
BASOPHILS # BLD AUTO: 0.04 10*3/MM3 (ref 0–0.2)
BASOPHILS NFR BLD AUTO: 0.6 % (ref 0–1.5)
BILIRUB SERPL-MCNC: 0.3 MG/DL (ref 0.2–1.2)
BUN SERPL-MCNC: 16 MG/DL (ref 8–23)
BUN/CREAT SERPL: 18.6 (ref 7–25)
CALCIUM SERPL-MCNC: 10 MG/DL (ref 8.6–10.5)
CHLORIDE SERPL-SCNC: 104 MMOL/L (ref 98–107)
CHOLEST SERPL-MCNC: 163 MG/DL (ref 0–200)
CO2 SERPL-SCNC: 26.8 MMOL/L (ref 22–29)
CREAT SERPL-MCNC: 0.86 MG/DL (ref 0.57–1)
EOSINOPHIL # BLD AUTO: 0.21 10*3/MM3 (ref 0–0.4)
EOSINOPHIL NFR BLD AUTO: 3.3 % (ref 0.3–6.2)
ERYTHROCYTE [DISTWIDTH] IN BLOOD BY AUTOMATED COUNT: 12.7 % (ref 12.3–15.4)
GLOBULIN SER CALC-MCNC: 2.4 GM/DL
GLUCOSE SERPL-MCNC: 119 MG/DL (ref 65–99)
HCT VFR BLD AUTO: 44.4 % (ref 34–46.6)
HDLC SERPL-MCNC: 47 MG/DL (ref 40–60)
HGB BLD-MCNC: 15.3 G/DL (ref 12–15.9)
IMM GRANULOCYTES # BLD AUTO: 0.02 10*3/MM3 (ref 0–0.05)
IMM GRANULOCYTES NFR BLD AUTO: 0.3 % (ref 0–0.5)
LDLC SERPL CALC-MCNC: 91 MG/DL (ref 0–100)
LDLC/HDLC SERPL: 1.93 {RATIO}
LYMPHOCYTES # BLD AUTO: 1.81 10*3/MM3 (ref 0.7–3.1)
LYMPHOCYTES NFR BLD AUTO: 28.4 % (ref 19.6–45.3)
MCH RBC QN AUTO: 32.8 PG (ref 26.6–33)
MCHC RBC AUTO-ENTMCNC: 34.5 G/DL (ref 31.5–35.7)
MCV RBC AUTO: 95.1 FL (ref 79–97)
MONOCYTES # BLD AUTO: 0.64 10*3/MM3 (ref 0.1–0.9)
MONOCYTES NFR BLD AUTO: 10 % (ref 5–12)
NEUTROPHILS # BLD AUTO: 3.65 10*3/MM3 (ref 1.7–7)
NEUTROPHILS NFR BLD AUTO: 57.4 % (ref 42.7–76)
NRBC BLD AUTO-RTO: 0 /100 WBC (ref 0–0.2)
PLATELET # BLD AUTO: 216 10*3/MM3 (ref 140–450)
POTASSIUM SERPL-SCNC: 4.9 MMOL/L (ref 3.5–5.2)
PROT SERPL-MCNC: 7 G/DL (ref 6–8.5)
RBC # BLD AUTO: 4.67 10*6/MM3 (ref 3.77–5.28)
SODIUM SERPL-SCNC: 141 MMOL/L (ref 136–145)
TRIGL SERPL-MCNC: 126 MG/DL (ref 0–150)
VLDLC SERPL CALC-MCNC: 25.2 MG/DL
WBC # BLD AUTO: 6.37 10*3/MM3 (ref 3.4–10.8)

## 2020-07-01 ENCOUNTER — OFFICE VISIT (OUTPATIENT)
Dept: FAMILY MEDICINE CLINIC | Facility: CLINIC | Age: 75
End: 2020-07-01

## 2020-07-01 VITALS
OXYGEN SATURATION: 98 % | WEIGHT: 169.1 LBS | BODY MASS INDEX: 34.09 KG/M2 | HEIGHT: 59 IN | DIASTOLIC BLOOD PRESSURE: 64 MMHG | HEART RATE: 68 BPM | TEMPERATURE: 97.7 F | SYSTOLIC BLOOD PRESSURE: 118 MMHG

## 2020-07-01 DIAGNOSIS — I10 ESSENTIAL HYPERTENSION: Primary | ICD-10-CM

## 2020-07-01 DIAGNOSIS — E66.9 OBESITY (BMI 30.0-34.9): ICD-10-CM

## 2020-07-01 DIAGNOSIS — E78.2 MIXED HYPERLIPIDEMIA: ICD-10-CM

## 2020-07-01 DIAGNOSIS — R73.9 HYPERGLYCEMIA: ICD-10-CM

## 2020-07-01 PROCEDURE — 99214 OFFICE O/P EST MOD 30 MIN: CPT | Performed by: INTERNAL MEDICINE

## 2020-07-01 NOTE — PROGRESS NOTES
Subjective   Jody Simmons is a 74 y.o. female. Patient is here today for No chief complaint on file.         Vitals:    07/01/20 1350   BP: 118/64   Pulse: 68   Temp: 97.7 °F (36.5 °C)   SpO2: 98%     Body mass index is 34.15 kg/m².      Past Medical History:   Diagnosis Date   • Dyslipidemia    • Endometrial polyp 04/21/2015    benign    • Fibroid    • H/O pelvic mass    • Hyperlipidemia    • Osteopenia    • PMB (postmenopausal bleeding)    • UTI (urinary tract infection)       Allergies   Allergen Reactions   • No Known Drug Allergy       Social History     Socioeconomic History   • Marital status: Single     Spouse name: Not on file   • Number of children: Not on file   • Years of education: Not on file   • Highest education level: Not on file   Tobacco Use   • Smoking status: Never Smoker   • Smokeless tobacco: Never Used   Substance and Sexual Activity   • Alcohol use: Yes     Comment: SOCIAL rare   • Drug use: No   • Sexual activity: Yes     Partners: Male     Birth control/protection: Post-menopausal        Current Outpatient Medications:   •  acetaminophen (TYLENOL) 325 MG tablet, Take 650 mg by mouth Every 6 (Six) Hours As Needed for Mild Pain ., Disp: , Rfl:   •  aspirin 81 MG tablet, Take  by mouth., Disp: , Rfl:   •  atorvastatin (LIPITOR) 10 MG tablet, TAKE 1 TABLET DAILY, Disp: 90 tablet, Rfl: 4  •  Calcium Carbonate-Vitamin D (CALTRATE 600+D PO), Take  by mouth., Disp: , Rfl:   •  Cholecalciferol (VITAMIN D) 1000 UNITS tablet, Take  by mouth., Disp: , Rfl:   •  fluticasone (CUTIVATE) 0.05 % cream, Apply  topically to the appropriate area as directed 2 (Two) Times a Day., Disp: 30 g, Rfl: 0  •  losartan (COZAAR) 100 MG tablet, TAKE 1 TABLET DAILY, Disp: 90 tablet, Rfl: 4  •  Multiple Vitamin (MULTI VITAMIN DAILY PO), Take  by mouth., Disp: , Rfl:   •  fluticasone (FLONASE) 50 MCG/ACT nasal spray, 2 sprays into the nostril(s) as directed by provider Daily., Disp: 18.2 g, Rfl: 1     Objective     This  patient is here to follow-up on labs done last week.    She has no complaints.       Review of Systems   Constitutional: Negative.    HENT: Negative.    Respiratory: Negative.    Cardiovascular: Negative.    Musculoskeletal: Negative.    Psychiatric/Behavioral: Negative.        Physical Exam   Constitutional: She is oriented to person, place, and time. She appears well-developed and well-nourished.   Pleasant, neatly groomed, in no distress.   HENT:   Head: Normocephalic and atraumatic.   Neck:   No carotid bruits.   Cardiovascular: Normal rate, regular rhythm and normal heart sounds.   Pulmonary/Chest: Effort normal and breath sounds normal.   Neurological: She is alert and oriented to person, place, and time.   Psychiatric: She has a normal mood and affect. Her behavior is normal.   Nursing note and vitals reviewed.        Problem List Items Addressed This Visit        Cardiovascular and Mediastinum    Mixed hyperlipidemia    Essential hypertension - Primary       Other    Hyperglycemia    Obesity (BMI 30.0-34.9)            PLAN  She and I reviewed her labs.    During our visit today, we both were facemasks.    Her hypertension is well controlled.    Her hypercholesterolemia is relatively well controlled on atorvastatin 10 mg daily.    She does have obesity with a BMI of 34.1 and she has hyperglycemia.  She is prediabetic.  I have encouraged her to do her best to lose weight via regular aerobic activity per American Heart Association guidelines and decrease caloric intake.    I asked her to follow-up for a Medicare wellness visit once yearly.    She should follow-up in about 6 months with the following labs about a week prior to that visit: Hemoglobin A1c, comprehensive metabolic panel, CBC, urinalysis, vitamin D level.  No follow-ups on file.

## 2020-11-02 DIAGNOSIS — R73.9 HYPERGLYCEMIA: ICD-10-CM

## 2020-11-02 DIAGNOSIS — E55.9 MILD VITAMIN D DEFICIENCY: ICD-10-CM

## 2020-11-02 DIAGNOSIS — E78.2 MIXED HYPERLIPIDEMIA: Primary | ICD-10-CM

## 2020-11-02 DIAGNOSIS — I10 ESSENTIAL HYPERTENSION: ICD-10-CM

## 2020-11-10 LAB
25(OH)D3+25(OH)D2 SERPL-MCNC: 46.9 NG/ML (ref 30–100)
ALBUMIN SERPL-MCNC: 4.3 G/DL (ref 3.5–5.2)
ALBUMIN/GLOB SERPL: 2.3 G/DL
ALP SERPL-CCNC: 140 U/L (ref 39–117)
ALT SERPL-CCNC: 21 U/L (ref 1–33)
APPEARANCE UR: CLEAR
AST SERPL-CCNC: 19 U/L (ref 1–32)
BACTERIA #/AREA URNS HPF: NORMAL /HPF
BASOPHILS # BLD AUTO: 0.04 10*3/MM3 (ref 0–0.2)
BASOPHILS NFR BLD AUTO: 0.4 % (ref 0–1.5)
BILIRUB SERPL-MCNC: 0.4 MG/DL (ref 0–1.2)
BILIRUB UR QL STRIP: NEGATIVE
BUN SERPL-MCNC: 15 MG/DL (ref 8–23)
BUN/CREAT SERPL: 16.7 (ref 7–25)
CALCIUM SERPL-MCNC: 8.8 MG/DL (ref 8.6–10.5)
CASTS URNS MICRO: NORMAL
CHLORIDE SERPL-SCNC: 102 MMOL/L (ref 98–107)
CHOLEST SERPL-MCNC: 145 MG/DL (ref 0–200)
CO2 SERPL-SCNC: 26 MMOL/L (ref 22–29)
COLOR UR: YELLOW
CREAT SERPL-MCNC: 0.9 MG/DL (ref 0.57–1)
EOSINOPHIL # BLD AUTO: 0.19 10*3/MM3 (ref 0–0.4)
EOSINOPHIL NFR BLD AUTO: 2 % (ref 0.3–6.2)
EPI CELLS #/AREA URNS HPF: NORMAL /HPF
ERYTHROCYTE [DISTWIDTH] IN BLOOD BY AUTOMATED COUNT: 12.3 % (ref 12.3–15.4)
GLOBULIN SER CALC-MCNC: 1.9 GM/DL
GLUCOSE SERPL-MCNC: 105 MG/DL (ref 65–99)
GLUCOSE UR QL: NEGATIVE
HBA1C MFR BLD: 5.8 % (ref 4.8–5.6)
HCT VFR BLD AUTO: 42.9 % (ref 34–46.6)
HDLC SERPL-MCNC: 52 MG/DL (ref 40–60)
HGB BLD-MCNC: 14.9 G/DL (ref 12–15.9)
HGB UR QL STRIP: ABNORMAL
IMM GRANULOCYTES # BLD AUTO: 0.03 10*3/MM3 (ref 0–0.05)
IMM GRANULOCYTES NFR BLD AUTO: 0.3 % (ref 0–0.5)
KETONES UR QL STRIP: NEGATIVE
LDLC SERPL CALC-MCNC: 77 MG/DL (ref 0–100)
LDLC/HDLC SERPL: 1.47 {RATIO}
LEUKOCYTE ESTERASE UR QL STRIP: NEGATIVE
LYMPHOCYTES # BLD AUTO: 1.57 10*3/MM3 (ref 0.7–3.1)
LYMPHOCYTES NFR BLD AUTO: 16.1 % (ref 19.6–45.3)
MCH RBC QN AUTO: 33 PG (ref 26.6–33)
MCHC RBC AUTO-ENTMCNC: 34.7 G/DL (ref 31.5–35.7)
MCV RBC AUTO: 95.1 FL (ref 79–97)
MONOCYTES # BLD AUTO: 1 10*3/MM3 (ref 0.1–0.9)
MONOCYTES NFR BLD AUTO: 10.3 % (ref 5–12)
NEUTROPHILS # BLD AUTO: 6.91 10*3/MM3 (ref 1.7–7)
NEUTROPHILS NFR BLD AUTO: 70.9 % (ref 42.7–76)
NITRITE UR QL STRIP: NEGATIVE
NRBC BLD AUTO-RTO: 0 /100 WBC (ref 0–0.2)
PH UR STRIP: 6 [PH] (ref 5–8)
PLATELET # BLD AUTO: 196 10*3/MM3 (ref 140–450)
POTASSIUM SERPL-SCNC: 4.4 MMOL/L (ref 3.5–5.2)
PROT SERPL-MCNC: 6.2 G/DL (ref 6–8.5)
PROT UR QL STRIP: NEGATIVE
RBC # BLD AUTO: 4.51 10*6/MM3 (ref 3.77–5.28)
RBC #/AREA URNS HPF: NORMAL /HPF
SODIUM SERPL-SCNC: 137 MMOL/L (ref 136–145)
SP GR UR: 1.01 (ref 1–1.03)
TRIGL SERPL-MCNC: 84 MG/DL (ref 0–150)
UROBILINOGEN UR STRIP-MCNC: ABNORMAL MG/DL
VLDLC SERPL CALC-MCNC: 16 MG/DL (ref 5–40)
WBC # BLD AUTO: 9.74 10*3/MM3 (ref 3.4–10.8)
WBC #/AREA URNS HPF: NORMAL /HPF

## 2020-11-16 ENCOUNTER — OFFICE VISIT (OUTPATIENT)
Dept: FAMILY MEDICINE CLINIC | Facility: CLINIC | Age: 75
End: 2020-11-16

## 2020-11-16 VITALS
TEMPERATURE: 97.5 F | HEIGHT: 59 IN | OXYGEN SATURATION: 99 % | SYSTOLIC BLOOD PRESSURE: 130 MMHG | RESPIRATION RATE: 18 BRPM | WEIGHT: 161.4 LBS | DIASTOLIC BLOOD PRESSURE: 70 MMHG | BODY MASS INDEX: 32.54 KG/M2 | HEART RATE: 79 BPM

## 2020-11-16 DIAGNOSIS — R73.9 HYPERGLYCEMIA: ICD-10-CM

## 2020-11-16 DIAGNOSIS — I10 ESSENTIAL HYPERTENSION: ICD-10-CM

## 2020-11-16 DIAGNOSIS — E78.2 MIXED HYPERLIPIDEMIA: ICD-10-CM

## 2020-11-16 DIAGNOSIS — Z23 ENCOUNTER FOR IMMUNIZATION: Primary | ICD-10-CM

## 2020-11-16 PROCEDURE — G0008 ADMIN INFLUENZA VIRUS VAC: HCPCS | Performed by: INTERNAL MEDICINE

## 2020-11-16 PROCEDURE — 99214 OFFICE O/P EST MOD 30 MIN: CPT | Performed by: INTERNAL MEDICINE

## 2020-11-16 PROCEDURE — 90694 VACC AIIV4 NO PRSRV 0.5ML IM: CPT | Performed by: INTERNAL MEDICINE

## 2020-11-17 NOTE — PROGRESS NOTES
Subjective   Jody Simmons is a 74 y.o. female. Patient is here today for   Chief Complaint   Patient presents with   • Hyperlipidemia     lab f/u   • Hypertension          Vitals:    11/16/20 1353   BP: 130/70   Pulse: 79   Resp: 18   Temp: 97.5 °F (36.4 °C)   SpO2: 99%     Body mass index is 32.58 kg/m².      Past Medical History:   Diagnosis Date   • Dyslipidemia    • Endometrial polyp 04/21/2015    benign    • Fibroid    • H/O pelvic mass    • Hyperlipidemia    • Osteopenia    • PMB (postmenopausal bleeding)    • UTI (urinary tract infection)       No Known Allergies   Social History     Socioeconomic History   • Marital status: Single     Spouse name: Not on file   • Number of children: Not on file   • Years of education: Not on file   • Highest education level: Not on file   Tobacco Use   • Smoking status: Never Smoker   • Smokeless tobacco: Never Used   Substance and Sexual Activity   • Alcohol use: Yes     Comment: SOCIAL rare   • Drug use: No   • Sexual activity: Yes     Partners: Male     Birth control/protection: Post-menopausal        Current Outpatient Medications:   •  acetaminophen (TYLENOL) 325 MG tablet, Take 650 mg by mouth Every 6 (Six) Hours As Needed for Mild Pain ., Disp: , Rfl:   •  Ascorbic Acid (VITAMIN C PO), Take  by mouth Daily., Disp: , Rfl:   •  aspirin 81 MG tablet, Take  by mouth., Disp: , Rfl:   •  atorvastatin (LIPITOR) 10 MG tablet, TAKE 1 TABLET DAILY, Disp: 90 tablet, Rfl: 4  •  Calcium Carbonate-Vitamin D (CALTRATE 600+D PO), Take  by mouth., Disp: , Rfl:   •  Cholecalciferol (VITAMIN D) 1000 UNITS tablet, Take  by mouth., Disp: , Rfl:   •  fluticasone (CUTIVATE) 0.05 % cream, Apply  topically to the appropriate area as directed 2 (Two) Times a Day., Disp: 30 g, Rfl: 0  •  losartan (COZAAR) 100 MG tablet, TAKE 1 TABLET DAILY, Disp: 90 tablet, Rfl: 4  •  Multiple Vitamin (MULTI VITAMIN DAILY PO), Take  by mouth., Disp: , Rfl:   •  Multiple Vitamins-Minerals (ZINC PO), Take  by  mouth Daily., Disp: , Rfl:      Objective     She is here to follow-up on labs from last week.    She has no complaints.    Hyperlipidemia    Hypertension         Review of Systems   Constitutional: Negative.    HENT: Negative.    Respiratory: Negative.    Cardiovascular: Negative.    Musculoskeletal: Negative.    Psychiatric/Behavioral: Negative.        Physical Exam  Vitals signs and nursing note reviewed.   Constitutional:       Appearance: Normal appearance.      Comments: Pleasant, neatly groomed, in no distress.   HENT:      Head: Normocephalic and atraumatic.   Neck:      Vascular: No carotid bruit.   Cardiovascular:      Rate and Rhythm: Normal rate and regular rhythm.      Heart sounds: Normal heart sounds. No murmur. No gallop.    Pulmonary:      Effort: No respiratory distress.      Breath sounds: Normal breath sounds. No wheezing or rales.   Skin:     Capillary Refill: Capillary refill takes less than 2 seconds.   Neurological:      Mental Status: She is alert and oriented to person, place, and time.      Cranial Nerves: No cranial nerve deficit.      Motor: No weakness.      Gait: Gait normal.   Psychiatric:         Mood and Affect: Mood normal.         Behavior: Behavior normal.         Thought Content: Thought content normal.         Judgment: Judgment normal.           Problems Addressed this Visit        Cardiovascular and Mediastinum    Mixed hyperlipidemia    Essential hypertension       Other    Hyperglycemia      Other Visit Diagnoses     Encounter for immunization    -  Primary    Relevant Orders    Fluad Quad 65+ yrs ([id number]) (Completed)      Diagnoses       Codes Comments    Encounter for immunization    -  Primary ICD-10-CM: Z23  ICD-9-CM: V03.89     Essential hypertension     ICD-10-CM: I10  ICD-9-CM: 401.9     Mixed hyperlipidemia     ICD-10-CM: E78.2  ICD-9-CM: 272.2     Hyperglycemia     ICD-10-CM: R73.9  ICD-9-CM: 790.29             PLAN  She and I reviewed her labs.  Her mixed  hyperlipidemia is well controlled.    Her hypertension is well controlled.    She has hyperglycemia.  She is having some success with weight loss and of encouraged to keep this up.    We will be monitoring her hyperglycemia of course.    He gave her a flu shot today.    She should follow-up for a Medicare wellness visit.    I asked her to follow-up in about 4 months.  Fasting labs prior to that visit should include: Hemoglobin A1c, comprehensive metabolic panel, CBC, urinalysis.  No follow-ups on file.

## 2021-01-04 ENCOUNTER — OFFICE VISIT (OUTPATIENT)
Dept: FAMILY MEDICINE CLINIC | Facility: CLINIC | Age: 76
End: 2021-01-04

## 2021-01-04 VITALS
TEMPERATURE: 96.8 F | BODY MASS INDEX: 33.63 KG/M2 | HEART RATE: 86 BPM | DIASTOLIC BLOOD PRESSURE: 86 MMHG | RESPIRATION RATE: 16 BRPM | SYSTOLIC BLOOD PRESSURE: 141 MMHG | OXYGEN SATURATION: 98 % | WEIGHT: 166.8 LBS | HEIGHT: 59 IN

## 2021-01-04 DIAGNOSIS — Z00.00 MEDICARE ANNUAL WELLNESS VISIT, SUBSEQUENT: Primary | ICD-10-CM

## 2021-01-04 PROCEDURE — G0439 PPPS, SUBSEQ VISIT: HCPCS | Performed by: NURSE PRACTITIONER

## 2021-01-04 NOTE — PROGRESS NOTES
The ABCs of the Annual Wellness Visit  Subsequent Medicare Wellness Visit    Chief Complaint   Patient presents with   • Medicare Wellness-subsequent       History of Present Illness:  Jody Simmons is a 75 y.o. female who presents for a Subsequent Medicare Wellness Visit.  Subjective   HEALTH RISK ASSESSMENT    Recent Hospitalizations:  No hospitalization(s) within the last year.    Current Medical Providers:  Patient Care Team:  Manolo Reddy MD as PCP - General (Internal Medicine)    Smoking Status:  Social History     Tobacco Use   Smoking Status Never Smoker   Smokeless Tobacco Never Used       Alcohol Consumption:  Social History     Substance and Sexual Activity   Alcohol Use Yes    Comment: SOCIAL rare       Depression Screen:   PHQ-2/PHQ-9 Depression Screening 1/4/2021   Little interest or pleasure in doing things 0   Feeling down, depressed, or hopeless 0   Trouble falling or staying asleep, or sleeping too much -   Feeling tired or having little energy -   Poor appetite or overeating -   Feeling bad about yourself - or that you are a failure or have let yourself or your family down -   Trouble concentrating on things, such as reading the newspaper or watching television -   Moving or speaking so slowly that other people could have noticed. Or the opposite - being so fidgety or restless that you have been moving around a lot more than usual -   Thoughts that you would be better off dead, or of hurting yourself in some way -   Total Score 0   If you checked off any problems, how difficult have these problems made it for you to do your work, take care of things at home, or get along with other people? -       Fall Risk Screen:  MONTSERRAT Fall Risk Assessment was completed, and patient is at LOW risk for falls.Assessment completed on:1/4/2021    Health Habits and Functional and Cognitive Screening:  Functional & Cognitive Status 1/4/2021   Do you have difficulty preparing food and eating? No   Do you have  difficulty bathing yourself, getting dressed or grooming yourself? No   Do you have difficulty using the toilet? No   Do you have difficulty moving around from place to place? No   Do you have trouble with steps or getting out of a bed or a chair? No   Current Diet Well Balanced Diet   Dental Exam Up to date   Eye Exam Up to date   Exercise (times per week) 5 times per week   Current Exercise Activities Include Walking   Do you need help using the phone?  No   Are you deaf or do you have serious difficulty hearing?  No   Do you need help with transportation? No   Do you need help shopping? No   Do you need help preparing meals?  No   Do you need help with housework?  No   Do you need help with laundry? No   Do you need help taking your medications? No   Do you need help managing money? No   Do you ever drive or ride in a car without wearing a seat belt? No   Have you felt unusual stress, anger or loneliness in the last month? -   Who do you live with? -   If you need help, do you have trouble finding someone available to you? -   Have you been bothered in the last four weeks by sexual problems? -   Do you have difficulty concentrating, remembering or making decisions? -       Does the patient have evidence of cognitive impairment? No    Asprin use counseling:Taking ASA appropriately as indicated    Age-appropriate Screening Schedule:  Refer to the list below for future screening recommendations based on patient's age, sex and/or medical conditions. Orders for these recommended tests are listed in the plan section. The patient has been provided with a written plan.    Health Maintenance   Topic Date Due   • ZOSTER VACCINE (2 of 2) 05/22/2018   • MAMMOGRAM  08/22/2019   • DXA SCAN  03/16/2020   • LIPID PANEL  11/09/2021   • COLONOSCOPY  04/03/2028   • TDAP/TD VACCINES (2 - Td) 04/18/2029   • INFLUENZA VACCINE  Completed          The following portions of the patient's history were reviewed and updated as appropriate:  allergies, current medications, past family history, past medical history, past social history, past surgical history and problem list.    Outpatient Medications Prior to Visit   Medication Sig Dispense Refill   • acetaminophen (TYLENOL) 325 MG tablet Take 650 mg by mouth Every 6 (Six) Hours As Needed for Mild Pain .     • Ascorbic Acid (VITAMIN C PO) Take  by mouth Daily.     • aspirin 81 MG tablet Take  by mouth.     • atorvastatin (LIPITOR) 10 MG tablet TAKE 1 TABLET DAILY 90 tablet 4   • Calcium Carbonate-Vitamin D (CALTRATE 600+D PO) Take  by mouth.     • Cholecalciferol (VITAMIN D) 1000 UNITS tablet Take  by mouth.     • fluticasone (CUTIVATE) 0.05 % cream Apply  topically to the appropriate area as directed 2 (Two) Times a Day. 30 g 0   • losartan (COZAAR) 100 MG tablet TAKE 1 TABLET DAILY 90 tablet 4   • Multiple Vitamin (MULTI VITAMIN DAILY PO) Take  by mouth.     • Multiple Vitamins-Minerals (ZINC PO) Take  by mouth Daily.       No facility-administered medications prior to visit.        Patient Active Problem List   Diagnosis   • Hyperglycemia   • Mixed hyperlipidemia   • Osteopenia   • Mild vitamin D deficiency   • Dysesthesia   • Essential hypertension   • Chronic left-sided headaches   • Dizziness   • Balance disorder   • Acute otitis media   • Cough   • Rash   • Edema   • Serous otitis media   • Obesity (BMI 30.0-34.9)       Advanced Care Planning:  ACP discussion was held with the patient during this visit. Patient has an advance directive in EMR which is still valid.     Review of Systems   Constitutional: Negative.    HENT: Negative.    Respiratory: Negative.    Cardiovascular: Negative.    Gastrointestinal: Negative.    Genitourinary: Negative.    Neurological: Negative.        Compared to one year ago, the patient feels her physical health is better.  Compared to one year ago, the patient feels her mental health is better.    Reviewed chart for potential of high risk medication in the elderly:  "yes  Reviewed chart for potential of harmful drug interactions in the elderly:yes    Objective         Vitals:    01/04/21 1530   BP: 141/86   BP Location: Left arm   Patient Position: Sitting   Cuff Size: Adult   Pulse: 86   Resp: 16   Temp: 96.8 °F (36 °C)   SpO2: 98%   Weight: 75.7 kg (166 lb 12.8 oz)   Height: 149.9 cm (59.02\")       Body mass index is 33.67 kg/m².  Discussed the patient's BMI with her. The BMI is above average; BMI management plan is completed.    Physical Exam  Vitals signs reviewed.   HENT:      Head: Normocephalic.   Eyes:      Pupils: Pupils are equal, round, and reactive to light.   Cardiovascular:      Rate and Rhythm: Normal rate and regular rhythm.   Pulmonary:      Effort: Pulmonary effort is normal.      Breath sounds: Normal breath sounds.   Musculoskeletal: Normal range of motion.   Neurological:      Mental Status: She is alert and oriented to person, place, and time.   Psychiatric:         Behavior: Behavior normal.         Lab Results   Component Value Date     (H) 11/09/2020    CHLPL 145 11/09/2020    TRIG 84 11/09/2020    HDL 52 11/09/2020    LDL 77 11/09/2020    VLDL 16 11/09/2020    HGBA1C 5.80 (H) 11/09/2020        Assessment/Plan   Medicare Risks and Personalized Health Plan  CMS Preventative Services Quick Reference  Advance Directive Discussion  Breast Cancer/Mammogram Screening  Immunizations Discussed/Encouraged (specific immunizations; Shingrix )  Obesity/Overweight   Osteoprorosis Risk    The above risks/problems have been discussed with the patient.  Pertinent information has been shared with the patient in the After Visit Summary.  Follow up plans and orders are seen below in the Assessment/Plan Section.    Diagnoses and all orders for this visit:    1. Medicare annual wellness visit, subsequent (Primary)      Follow Up:  Return for Dr. Reddy as needed/as recommended.     An After Visit Summary and PPPS were given to the patient.     Declined Dexa and " mammogram scheduling at this time; will  Call when ready to schedule.

## 2021-01-22 RX ORDER — LOSARTAN POTASSIUM 100 MG/1
100 TABLET ORAL DAILY
Qty: 90 TABLET | Refills: 3 | Status: SHIPPED | OUTPATIENT
Start: 2021-01-22 | End: 2021-04-08 | Stop reason: SDUPTHER

## 2021-01-22 RX ORDER — ATORVASTATIN CALCIUM 10 MG/1
10 TABLET, FILM COATED ORAL DAILY
Qty: 90 TABLET | Refills: 3 | Status: SHIPPED | OUTPATIENT
Start: 2021-01-22 | End: 2021-04-08 | Stop reason: SDUPTHER

## 2021-03-03 DIAGNOSIS — R73.9 HYPERGLYCEMIA: ICD-10-CM

## 2021-03-03 DIAGNOSIS — E78.2 MIXED HYPERLIPIDEMIA: Primary | ICD-10-CM

## 2021-03-03 DIAGNOSIS — I10 ESSENTIAL HYPERTENSION: ICD-10-CM

## 2021-03-19 LAB
ALBUMIN SERPL-MCNC: 4.5 G/DL (ref 3.5–5.2)
ALBUMIN/GLOB SERPL: 2 G/DL
ALP SERPL-CCNC: 145 U/L (ref 39–117)
ALT SERPL-CCNC: 24 U/L (ref 1–33)
APPEARANCE UR: ABNORMAL
AST SERPL-CCNC: 21 U/L (ref 1–32)
BACTERIA #/AREA URNS HPF: ABNORMAL /HPF
BASOPHILS # BLD AUTO: 0.04 10*3/MM3 (ref 0–0.2)
BASOPHILS NFR BLD AUTO: 0.6 % (ref 0–1.5)
BILIRUB SERPL-MCNC: 0.4 MG/DL (ref 0–1.2)
BILIRUB UR QL STRIP: NEGATIVE
BUN SERPL-MCNC: 19 MG/DL (ref 8–23)
BUN/CREAT SERPL: 19.4 (ref 7–25)
CALCIUM SERPL-MCNC: 9.5 MG/DL (ref 8.6–10.5)
CASTS URNS MICRO: ABNORMAL
CHLORIDE SERPL-SCNC: 101 MMOL/L (ref 98–107)
CHOLEST SERPL-MCNC: 139 MG/DL (ref 0–200)
CO2 SERPL-SCNC: 24.8 MMOL/L (ref 22–29)
COLOR UR: YELLOW
CREAT SERPL-MCNC: 0.98 MG/DL (ref 0.57–1)
EOSINOPHIL # BLD AUTO: 0.17 10*3/MM3 (ref 0–0.4)
EOSINOPHIL NFR BLD AUTO: 2.7 % (ref 0.3–6.2)
EPI CELLS #/AREA URNS HPF: ABNORMAL /HPF
ERYTHROCYTE [DISTWIDTH] IN BLOOD BY AUTOMATED COUNT: 12.7 % (ref 12.3–15.4)
GLOBULIN SER CALC-MCNC: 2.3 GM/DL
GLUCOSE SERPL-MCNC: 113 MG/DL (ref 65–99)
GLUCOSE UR QL: NEGATIVE
HBA1C MFR BLD: 6.1 % (ref 4.8–5.6)
HCT VFR BLD AUTO: 43.7 % (ref 34–46.6)
HDLC SERPL-MCNC: 40 MG/DL (ref 40–60)
HGB BLD-MCNC: 14.9 G/DL (ref 12–15.9)
HGB UR QL STRIP: NEGATIVE
IMM GRANULOCYTES # BLD AUTO: 0.03 10*3/MM3 (ref 0–0.05)
IMM GRANULOCYTES NFR BLD AUTO: 0.5 % (ref 0–0.5)
KETONES UR QL STRIP: NEGATIVE
LDLC SERPL CALC-MCNC: 76 MG/DL (ref 0–100)
LDLC/HDLC SERPL: 1.82 {RATIO}
LEUKOCYTE ESTERASE UR QL STRIP: ABNORMAL
LYMPHOCYTES # BLD AUTO: 1.76 10*3/MM3 (ref 0.7–3.1)
LYMPHOCYTES NFR BLD AUTO: 27.8 % (ref 19.6–45.3)
MCH RBC QN AUTO: 32.7 PG (ref 26.6–33)
MCHC RBC AUTO-ENTMCNC: 34.1 G/DL (ref 31.5–35.7)
MCV RBC AUTO: 95.8 FL (ref 79–97)
MONOCYTES # BLD AUTO: 0.62 10*3/MM3 (ref 0.1–0.9)
MONOCYTES NFR BLD AUTO: 9.8 % (ref 5–12)
NEUTROPHILS # BLD AUTO: 3.71 10*3/MM3 (ref 1.7–7)
NEUTROPHILS NFR BLD AUTO: 58.6 % (ref 42.7–76)
NITRITE UR QL STRIP: NEGATIVE
NRBC BLD AUTO-RTO: 0 /100 WBC (ref 0–0.2)
PH UR STRIP: 6 [PH] (ref 5–8)
PLATELET # BLD AUTO: 205 10*3/MM3 (ref 140–450)
POTASSIUM SERPL-SCNC: 4.2 MMOL/L (ref 3.5–5.2)
PROT SERPL-MCNC: 6.8 G/DL (ref 6–8.5)
PROT UR QL STRIP: NEGATIVE
RBC # BLD AUTO: 4.56 10*6/MM3 (ref 3.77–5.28)
RBC #/AREA URNS HPF: ABNORMAL /HPF
SODIUM SERPL-SCNC: 137 MMOL/L (ref 136–145)
SP GR UR: 1.01 (ref 1–1.03)
TRIGL SERPL-MCNC: 132 MG/DL (ref 0–150)
UROBILINOGEN UR STRIP-MCNC: ABNORMAL MG/DL
VLDLC SERPL CALC-MCNC: 23 MG/DL (ref 5–40)
WBC # BLD AUTO: 6.33 10*3/MM3 (ref 3.4–10.8)
WBC #/AREA URNS HPF: ABNORMAL /HPF

## 2021-03-25 ENCOUNTER — OFFICE VISIT (OUTPATIENT)
Dept: FAMILY MEDICINE CLINIC | Facility: CLINIC | Age: 76
End: 2021-03-25

## 2021-03-25 VITALS
SYSTOLIC BLOOD PRESSURE: 112 MMHG | HEIGHT: 59 IN | RESPIRATION RATE: 18 BRPM | OXYGEN SATURATION: 97 % | TEMPERATURE: 97.5 F | BODY MASS INDEX: 33.47 KG/M2 | DIASTOLIC BLOOD PRESSURE: 70 MMHG | WEIGHT: 166 LBS | HEART RATE: 57 BPM

## 2021-03-25 DIAGNOSIS — E66.9 OBESITY (BMI 30.0-34.9): ICD-10-CM

## 2021-03-25 DIAGNOSIS — I10 ESSENTIAL HYPERTENSION: ICD-10-CM

## 2021-03-25 DIAGNOSIS — R73.03 PREDIABETES: ICD-10-CM

## 2021-03-25 DIAGNOSIS — R74.8 ELEVATED ALKALINE PHOSPHATASE LEVEL: Primary | ICD-10-CM

## 2021-03-25 DIAGNOSIS — R74.8 ACID PHOSPHATASE ELEVATED: Primary | ICD-10-CM

## 2021-03-25 PROCEDURE — 99214 OFFICE O/P EST MOD 30 MIN: CPT | Performed by: INTERNAL MEDICINE

## 2021-03-25 NOTE — PROGRESS NOTES
Subjective   Jody Simmons is a 75 y.o. female. Patient is here today for   Chief Complaint   Patient presents with   • Hyperlipidemia     fu labs   • Hypertension          Vitals:    03/25/21 1259   BP: 112/70   Pulse: 57   Resp: 18   Temp: 97.5 °F (36.4 °C)   SpO2: 97%     Body mass index is 33.51 kg/m².      Past Medical History:   Diagnosis Date   • Dyslipidemia    • Endometrial polyp 04/21/2015    benign    • Fibroid    • H/O pelvic mass    • Hyperlipidemia    • Osteopenia    • PMB (postmenopausal bleeding)    • UTI (urinary tract infection)       No Known Allergies   Social History     Socioeconomic History   • Marital status: Single     Spouse name: Not on file   • Number of children: Not on file   • Years of education: Not on file   • Highest education level: Not on file   Tobacco Use   • Smoking status: Never Smoker   • Smokeless tobacco: Never Used   Substance and Sexual Activity   • Alcohol use: Yes     Comment: SOCIAL rare   • Drug use: No   • Sexual activity: Yes     Partners: Male     Birth control/protection: Post-menopausal        Current Outpatient Medications:   •  acetaminophen (TYLENOL) 325 MG tablet, Take 650 mg by mouth Every 6 (Six) Hours As Needed for Mild Pain ., Disp: , Rfl:   •  Ascorbic Acid (VITAMIN C PO), Take  by mouth Daily., Disp: , Rfl:   •  aspirin 81 MG tablet, Take  by mouth., Disp: , Rfl:   •  atorvastatin (LIPITOR) 10 MG tablet, Take 1 tablet by mouth Daily., Disp: 90 tablet, Rfl: 3  •  Calcium Carbonate-Vitamin D (CALTRATE 600+D PO), Take  by mouth., Disp: , Rfl:   •  Cholecalciferol (VITAMIN D) 1000 UNITS tablet, Take  by mouth., Disp: , Rfl:   •  fluticasone (CUTIVATE) 0.05 % cream, Apply  topically to the appropriate area as directed 2 (Two) Times a Day., Disp: 30 g, Rfl: 0  •  losartan (COZAAR) 100 MG tablet, Take 1 tablet by mouth Daily., Disp: 90 tablet, Rfl: 3  •  Multiple Vitamin (MULTI VITAMIN DAILY PO), Take  by mouth., Disp: , Rfl:   •  Multiple Vitamins-Minerals  (ZINC PO), Take  by mouth Daily., Disp: , Rfl:      Objective     This patient is here to follow-up on labs from last week.    She tells me that she feels well.    She has bruised her labs however and she noted that she had an elevation of her alkaline phosphatase, her fasting glucose was slightly high, and she had some leukocytes in her urinalysis.           Review of Systems   Constitutional: Negative.    HENT: Negative.    Respiratory: Negative.    Cardiovascular: Negative.    Musculoskeletal: Negative.    Psychiatric/Behavioral: Negative.        Physical Exam  Vitals and nursing note reviewed.   Constitutional:       Appearance: Normal appearance. She is obese. She is not ill-appearing or diaphoretic.   Cardiovascular:      Rate and Rhythm: Regular rhythm.      Heart sounds: Normal heart sounds. No murmur heard.   No gallop.    Pulmonary:      Effort: No respiratory distress.      Breath sounds: Normal breath sounds. No wheezing or rales.   Neurological:      Mental Status: She is alert and oriented to person, place, and time.   Psychiatric:         Mood and Affect: Mood normal.         Behavior: Behavior normal.         Thought Content: Thought content normal.           Problems Addressed this Visit        Cardiac and Vasculature    Essential hypertension       Endocrine and Metabolic    Obesity (BMI 30.0-34.9)    Prediabetes      Other Visit Diagnoses     Elevated alkaline phosphatase level    -  Primary    Relevant Orders    Alkaline Phosphatase, Isoenzymes    Urinalysis With Microscopic If Indicated (No Culture) - Urine, Clean Catch      Diagnoses       Codes Comments    Elevated alkaline phosphatase level    -  Primary ICD-10-CM: R74.8  ICD-9-CM: 790.5     Obesity (BMI 30.0-34.9)     ICD-10-CM: E66.9  ICD-9-CM: 278.00     Essential hypertension     ICD-10-CM: I10  ICD-9-CM: 401.9     Prediabetes     ICD-10-CM: R73.03  ICD-9-CM: 790.29             PLAN  She and I reviewed her labs.  She has prediabetes.  Her  hemoglobin A1c is 6.1%.  She is obese.  I have asked her to do her best to lose weight via regular aerobic activity and decrease caloric intake.    She has some leukocytes on her urinalysis though she does not have any urinary tract symptoms per se.    Her alkaline phosphatase are a little bit elevated.  She has no other liver function abnormalities (normal AST, normal ALT (.    I would recheck her alkaline phosphatase isoenzymes,    I am going to do a urinalysis.    I think it is likely that her alkaline phosphatase is slightly elevated because of her cholesterol-lowering medication (atorvastatin).    Like to see her back in 4 5 months.  Fasting labs prior to that visit should include: Lipid profile, comprehensive metabolic panel, CBC, urinalysis.  No follow-ups on file.

## 2021-03-26 LAB
ALP BONE CFR SERPL: 41 % (ref 14–68)
ALP INTEST CFR SERPL: 1 % (ref 0–18)
ALP LIVER CFR SERPL: 58 % (ref 18–85)
ALP SERPL-CCNC: 144 IU/L (ref 39–117)
APPEARANCE UR: CLEAR
BILIRUB UR QL STRIP: NEGATIVE
COLOR UR: YELLOW
GLUCOSE UR QL: NEGATIVE
HGB UR QL STRIP: NEGATIVE
KETONES UR QL STRIP: NEGATIVE
LEUKOCYTE ESTERASE UR QL STRIP: NEGATIVE
NITRITE UR QL STRIP: NEGATIVE
PH UR STRIP: 7 [PH] (ref 5–7.5)
PROT UR QL STRIP: NEGATIVE
SP GR UR: 1.01 (ref 1–1.03)
UROBILINOGEN UR STRIP-MCNC: 0.2 MG/DL (ref 0.2–1)

## 2021-04-08 ENCOUNTER — TELEPHONE (OUTPATIENT)
Dept: FAMILY MEDICINE CLINIC | Facility: CLINIC | Age: 76
End: 2021-04-08

## 2021-04-08 RX ORDER — ATORVASTATIN CALCIUM 10 MG/1
10 TABLET, FILM COATED ORAL DAILY
Qty: 90 TABLET | Refills: 3 | Status: SHIPPED | OUTPATIENT
Start: 2021-04-08 | End: 2022-02-14 | Stop reason: SDUPTHER

## 2021-04-08 RX ORDER — LOSARTAN POTASSIUM 100 MG/1
100 TABLET ORAL DAILY
Qty: 90 TABLET | Refills: 3 | Status: SHIPPED | OUTPATIENT
Start: 2021-04-08 | End: 2022-02-14 | Stop reason: SDUPTHER

## 2021-04-08 NOTE — TELEPHONE ENCOUNTER
Caller: Jody Simmons    Relationship: Self    Best call back number: [phone]    Caller requesting test results:  PATIENT     What test was performed: LABS     When was the test performed: 3/18/2021     Where was the test performed: OFFICE    Additional notes: PLEASE CALL PATIENT WITH LAB RESULTS .

## 2021-04-14 ENCOUNTER — OFFICE VISIT (OUTPATIENT)
Dept: FAMILY MEDICINE CLINIC | Facility: CLINIC | Age: 76
End: 2021-04-14

## 2021-04-14 VITALS
RESPIRATION RATE: 18 BRPM | WEIGHT: 160.2 LBS | DIASTOLIC BLOOD PRESSURE: 70 MMHG | HEIGHT: 59 IN | HEART RATE: 89 BPM | BODY MASS INDEX: 32.3 KG/M2 | OXYGEN SATURATION: 98 % | SYSTOLIC BLOOD PRESSURE: 130 MMHG | TEMPERATURE: 96.2 F

## 2021-04-14 DIAGNOSIS — R73.03 PREDIABETES: ICD-10-CM

## 2021-04-14 DIAGNOSIS — Z78.0 POSTMENOPAUSAL: ICD-10-CM

## 2021-04-14 DIAGNOSIS — Z12.31 ENCOUNTER FOR SCREENING MAMMOGRAM FOR MALIGNANT NEOPLASM OF BREAST: ICD-10-CM

## 2021-04-14 DIAGNOSIS — E78.2 MIXED HYPERLIPIDEMIA: ICD-10-CM

## 2021-04-14 DIAGNOSIS — I10 ESSENTIAL HYPERTENSION: ICD-10-CM

## 2021-04-14 DIAGNOSIS — E66.9 OBESITY (BMI 30.0-34.9): ICD-10-CM

## 2021-04-14 DIAGNOSIS — Z12.11 COLON CANCER SCREENING: Primary | ICD-10-CM

## 2021-04-14 PROCEDURE — 99214 OFFICE O/P EST MOD 30 MIN: CPT | Performed by: INTERNAL MEDICINE

## 2021-04-14 NOTE — PROGRESS NOTES
Subjective   Jody Simmons is a 75 y.o. female. Patient is here today for   Chief Complaint   Patient presents with   • Hyperlipidemia     lab fu          Vitals:    04/14/21 0800   BP: 130/70   Pulse: 89   Resp: 18   Temp: 96.2 °F (35.7 °C)   SpO2: 98%     Body mass index is 32.34 kg/m².      Past Medical History:   Diagnosis Date   • Dyslipidemia    • Endometrial polyp 04/21/2015    benign    • Fibroid    • H/O pelvic mass    • Hyperlipidemia    • Osteopenia    • PMB (postmenopausal bleeding)    • UTI (urinary tract infection)       No Known Allergies   Social History     Socioeconomic History   • Marital status: Single     Spouse name: Not on file   • Number of children: Not on file   • Years of education: Not on file   • Highest education level: Not on file   Tobacco Use   • Smoking status: Never Smoker   • Smokeless tobacco: Never Used   Substance and Sexual Activity   • Alcohol use: Yes     Comment: SOCIAL rare   • Drug use: No   • Sexual activity: Yes     Partners: Male     Birth control/protection: Post-menopausal        Current Outpatient Medications:   •  acetaminophen (TYLENOL) 325 MG tablet, Take 650 mg by mouth Every 6 (Six) Hours As Needed for Mild Pain ., Disp: , Rfl:   •  Ascorbic Acid (VITAMIN C PO), Take  by mouth Daily., Disp: , Rfl:   •  aspirin 81 MG tablet, Take  by mouth., Disp: , Rfl:   •  atorvastatin (LIPITOR) 10 MG tablet, Take 1 tablet by mouth Daily., Disp: 90 tablet, Rfl: 3  •  Calcium Carbonate-Vitamin D (CALTRATE 600+D PO), Take  by mouth., Disp: , Rfl:   •  Cholecalciferol (VITAMIN D) 1000 UNITS tablet, Take  by mouth., Disp: , Rfl:   •  fluticasone (CUTIVATE) 0.05 % cream, Apply  topically to the appropriate area as directed 2 (Two) Times a Day., Disp: 30 g, Rfl: 0  •  losartan (COZAAR) 100 MG tablet, Take 1 tablet by mouth Daily., Disp: 90 tablet, Rfl: 3  •  Multiple Vitamin (MULTI VITAMIN DAILY PO), Take  by mouth., Disp: , Rfl:   •  Multiple Vitamins-Minerals (ZINC PO), Take  by  mouth Daily., Disp: , Rfl:      Objective     She is here to follow-up on labs from last week.    She has had an elevation of her alkaline phosphatase which is slight (144 the last time we checked).  I did isoenzymes on her which revealed a normal distribution with no predominance of either bone or liver source.    She is overdue for 3 things besides: Needs a colon cancer screening, mammogram, and a bone density.    She tells me that she feels well.  She is lost 6 pounds since I saw her last.  Mostly she is accomplish this by cutting out carbs.           Review of Systems   Constitutional: Negative.    HENT: Negative.    Eyes: Negative.    Respiratory: Negative.    Cardiovascular: Negative.    Gastrointestinal: Negative.    Endocrine: Negative.    Genitourinary: Negative.    Musculoskeletal: Negative.    Skin: Negative.    Allergic/Immunologic: Negative.    Neurological: Negative.    Hematological: Negative.    Psychiatric/Behavioral: Negative.        Physical Exam  Vitals and nursing note reviewed.   Constitutional:       Appearance: Normal appearance. She is obese. She is not ill-appearing or diaphoretic.      Comments: Pleasant, neatly groomed, no distress.   Cardiovascular:      Rate and Rhythm: Regular rhythm.      Heart sounds: Normal heart sounds. No murmur heard.   No gallop.    Pulmonary:      Effort: No respiratory distress.      Breath sounds: Normal breath sounds. No wheezing or rales.   Neurological:      Mental Status: She is alert and oriented to person, place, and time.   Psychiatric:         Mood and Affect: Mood normal.         Behavior: Behavior normal.         Thought Content: Thought content normal.           Problems Addressed this Visit        Cardiac and Vasculature    Mixed hyperlipidemia    Essential hypertension       Endocrine and Metabolic    Obesity (BMI 30.0-34.9)    Prediabetes      Other Visit Diagnoses     Colon cancer screening    -  Primary    Relevant Orders    Cologuard - Stool,  Per Rectum    Postmenopausal        Relevant Orders    DEXA Bone Density Axial    Encounter for screening mammogram for malignant neoplasm of breast        Relevant Orders    Mammo Screening Bilateral With CAD      Diagnoses       Codes Comments    Colon cancer screening    -  Primary ICD-10-CM: Z12.11  ICD-9-CM: V76.51     Postmenopausal     ICD-10-CM: Z78.0  ICD-9-CM: V49.81     Encounter for screening mammogram for malignant neoplasm of breast     ICD-10-CM: Z12.31  ICD-9-CM: V76.12     Prediabetes     ICD-10-CM: R73.03  ICD-9-CM: 790.29     Obesity (BMI 30.0-34.9)     ICD-10-CM: E66.9  ICD-9-CM: 278.00     Essential hypertension     ICD-10-CM: I10  ICD-9-CM: 401.9     Mixed hyperlipidemia     ICD-10-CM: E78.2  ICD-9-CM: 272.2             PLAN  She and I reviewed her labs.  She has good control of her cholesterol.    She has a singular elevation in her alkaline phosphatase.  I think this is attributable to her statin drug (atorvastatin 10 mg daily).    Would like to have her back in about 4 months to recheck a lipid and a comprehensive metabolic panel, hemoglobin A1c, CBC, urinalysis..    She is prediabetic.  Her hemoglobin A1c of 6.1%.  She is obese and she has made an effort to lose weight and I told her to keep up the good work.  She gets regular aerobic activity as well.    Her hypertension is well controlled.    I arranged a Cologuard, DEXA, and mammogram for her today.    No follow-ups on file.

## 2021-06-17 ENCOUNTER — HOSPITAL ENCOUNTER (OUTPATIENT)
Dept: BONE DENSITY | Facility: HOSPITAL | Age: 76
Discharge: HOME | End: 2021-06-17

## 2021-06-17 ENCOUNTER — HOSPITAL ENCOUNTER (OUTPATIENT)
Dept: MAMMOGRAPHY | Facility: HOSPITAL | Age: 76
Discharge: HOME | End: 2021-06-17

## 2021-06-17 DIAGNOSIS — Z12.31 ENCOUNTER FOR SCREENING MAMMOGRAM FOR MALIGNANT NEOPLASM OF BREAST: ICD-10-CM

## 2021-06-17 PROCEDURE — 77080 DXA BONE DENSITY AXIAL: CPT

## 2021-06-17 PROCEDURE — 77063 BREAST TOMOSYNTHESIS BI: CPT

## 2021-06-17 PROCEDURE — 77067 SCR MAMMO BI INCL CAD: CPT

## 2021-07-08 DIAGNOSIS — E78.2 MIXED HYPERLIPIDEMIA: Primary | ICD-10-CM

## 2021-07-08 DIAGNOSIS — R73.03 PREDIABETES: ICD-10-CM

## 2021-07-19 ENCOUNTER — OFFICE VISIT (OUTPATIENT)
Dept: FAMILY MEDICINE CLINIC | Facility: CLINIC | Age: 76
End: 2021-07-19

## 2021-07-19 VITALS
OXYGEN SATURATION: 96 % | DIASTOLIC BLOOD PRESSURE: 80 MMHG | BODY MASS INDEX: 31.93 KG/M2 | TEMPERATURE: 99.1 F | HEART RATE: 90 BPM | HEIGHT: 59 IN | RESPIRATION RATE: 18 BRPM | SYSTOLIC BLOOD PRESSURE: 142 MMHG | WEIGHT: 158.4 LBS

## 2021-07-19 DIAGNOSIS — Z13.21 ENCOUNTER FOR VITAMIN DEFICIENCY SCREENING: ICD-10-CM

## 2021-07-19 DIAGNOSIS — I10 ESSENTIAL HYPERTENSION: ICD-10-CM

## 2021-07-19 DIAGNOSIS — R73.03 PREDIABETES: ICD-10-CM

## 2021-07-19 DIAGNOSIS — E55.9 MILD VITAMIN D DEFICIENCY: Primary | ICD-10-CM

## 2021-07-19 DIAGNOSIS — R73.03 PREDIABETES: Primary | ICD-10-CM

## 2021-07-19 DIAGNOSIS — D75.89 MACROCYTOSIS WITHOUT ANEMIA: Primary | ICD-10-CM

## 2021-07-19 DIAGNOSIS — E78.2 MIXED HYPERLIPIDEMIA: ICD-10-CM

## 2021-07-19 PROCEDURE — 99214 OFFICE O/P EST MOD 30 MIN: CPT | Performed by: INTERNAL MEDICINE

## 2021-07-19 NOTE — PROGRESS NOTES
Subjective   Jody Simmons is a 75 y.o. female. Patient is here today for   Chief Complaint   Patient presents with   • Hyperlipidemia     FOLLOW UP           Vitals:    07/19/21 1411   BP: 142/80   Pulse: 90   Resp: 18   Temp: 99.1 °F (37.3 °C)   SpO2: 96%     Body mass index is 31.98 kg/m².      Past Medical History:   Diagnosis Date   • Dyslipidemia    • Endometrial polyp 04/21/2015    benign    • Fibroid    • H/O pelvic mass    • Hyperlipidemia    • Osteopenia    • PMB (postmenopausal bleeding)    • UTI (urinary tract infection)       No Known Allergies   Social History     Socioeconomic History   • Marital status: Single     Spouse name: Not on file   • Number of children: Not on file   • Years of education: Not on file   • Highest education level: Not on file   Tobacco Use   • Smoking status: Never Smoker   • Smokeless tobacco: Never Used   Substance and Sexual Activity   • Alcohol use: Yes     Comment: SOCIAL rare   • Drug use: No   • Sexual activity: Yes     Partners: Male     Birth control/protection: Post-menopausal        Current Outpatient Medications:   •  acetaminophen (TYLENOL) 325 MG tablet, Take 650 mg by mouth Every 6 (Six) Hours As Needed for Mild Pain ., Disp: , Rfl:   •  Ascorbic Acid (VITAMIN C PO), Take  by mouth Daily., Disp: , Rfl:   •  aspirin 81 MG tablet, Take  by mouth., Disp: , Rfl:   •  atorvastatin (LIPITOR) 10 MG tablet, Take 1 tablet by mouth Daily., Disp: 90 tablet, Rfl: 3  •  Calcium Carbonate-Vitamin D (CALTRATE 600+D PO), Take  by mouth., Disp: , Rfl:   •  Cholecalciferol (VITAMIN D) 1000 UNITS tablet, Take  by mouth., Disp: , Rfl:   •  fluticasone (CUTIVATE) 0.05 % cream, Apply  topically to the appropriate area as directed 2 (Two) Times a Day., Disp: 30 g, Rfl: 0  •  losartan (COZAAR) 100 MG tablet, Take 1 tablet by mouth Daily., Disp: 90 tablet, Rfl: 3  •  Multiple Vitamin (MULTI VITAMIN DAILY PO), Take  by mouth., Disp: , Rfl:   •  Multiple Vitamins-Minerals (ZINC PO), Take   by mouth Daily., Disp: , Rfl:      Objective     This patient is here to follow-up on hypertension and some labs done last week.    She tells me that she feels well.       Review of Systems   Constitutional: Negative.    HENT: Negative.    Respiratory: Negative.    Cardiovascular: Negative.    Psychiatric/Behavioral: Negative.        Physical Exam  Vitals and nursing note reviewed.   Constitutional:       Appearance: Normal appearance.      Comments: Pleasant, neatly groomed, in no distress.   Neck:      Vascular: No carotid bruit.   Cardiovascular:      Rate and Rhythm: Regular rhythm.      Heart sounds: Normal heart sounds. No murmur heard.   No gallop.    Pulmonary:      Effort: No respiratory distress.      Breath sounds: Normal breath sounds. No wheezing or rales.   Neurological:      Mental Status: She is alert and oriented to person, place, and time.   Psychiatric:         Mood and Affect: Mood normal.         Behavior: Behavior normal.         Thought Content: Thought content normal.           Problems Addressed this Visit        Cardiac and Vasculature    Mixed hyperlipidemia    Essential hypertension       Endocrine and Metabolic    Prediabetes      Other Visit Diagnoses     Macrocytosis without anemia    -  Primary      Diagnoses       Codes Comments    Macrocytosis without anemia    -  Primary ICD-10-CM: D75.89  ICD-9-CM: 289.89     Mixed hyperlipidemia     ICD-10-CM: E78.2  ICD-9-CM: 272.2     Essential hypertension     ICD-10-CM: I10  ICD-9-CM: 401.9     Prediabetes     ICD-10-CM: R73.03  ICD-9-CM: 790.29             PLAN  Her red blood cells exhibit some macrocytosis. I Kamla check a vitamin B12.    She is prediabetic and this is no worse than it had been in the past. Of course it remains a concern and she needs to be losing weight and getting regular aerobic activity but her hemoglobin A1c is essentially unchanged.    Her mixed hyperlipidemia is with good control on atorvastatin 10 mg daily.    Her  hypertension is well controlled.    She should follow-up for Medicare wellness visit in about 6 months. (After January 6).      No follow-ups on file.  Answers for HPI/ROS submitted by the patient on 7/19/2021  Please describe your symptoms.: High blood pressure  Have you had these symptoms before?: Yes  How long have you been having these symptoms?: Greater than 2 weeks  Please list any medications you are currently taking for this condition.: Losartan  What is the primary reason for your visit?: Other

## 2021-07-20 LAB — VIT B12 SERPL-MCNC: 777 PG/ML (ref 232–1245)

## 2021-07-24 LAB
25(OH)D3+25(OH)D2 SERPL-MCNC: 57.6 NG/ML (ref 30–100)
Lab: NORMAL
WRITTEN AUTHORIZATION: NORMAL

## 2021-11-01 DIAGNOSIS — E78.2 MIXED HYPERLIPIDEMIA: Primary | ICD-10-CM

## 2021-11-03 LAB
ALBUMIN SERPL-MCNC: 4.6 G/DL (ref 3.7–4.7)
ALBUMIN/GLOB SERPL: 2.3 {RATIO} (ref 1.2–2.2)
ALP SERPL-CCNC: 138 IU/L (ref 44–121)
ALT SERPL-CCNC: 19 IU/L (ref 0–32)
APPEARANCE UR: CLEAR
AST SERPL-CCNC: 19 IU/L (ref 0–40)
BACTERIA #/AREA URNS HPF: NORMAL /[HPF]
BASOPHILS # BLD AUTO: 0 X10E3/UL (ref 0–0.2)
BASOPHILS NFR BLD AUTO: 1 %
BILIRUB SERPL-MCNC: 0.4 MG/DL (ref 0–1.2)
BILIRUB UR QL STRIP: NEGATIVE
BUN SERPL-MCNC: 14 MG/DL (ref 8–27)
BUN/CREAT SERPL: 16 (ref 12–28)
CALCIUM SERPL-MCNC: 9.2 MG/DL (ref 8.7–10.3)
CASTS URNS QL MICRO: NORMAL /LPF
CHLORIDE SERPL-SCNC: 103 MMOL/L (ref 96–106)
CHOLEST SERPL-MCNC: 158 MG/DL (ref 100–199)
CO2 SERPL-SCNC: 25 MMOL/L (ref 20–29)
COLOR UR: YELLOW
CREAT SERPL-MCNC: 0.9 MG/DL (ref 0.57–1)
EOSINOPHIL # BLD AUTO: 0.2 X10E3/UL (ref 0–0.4)
EOSINOPHIL NFR BLD AUTO: 3 %
EPI CELLS #/AREA URNS HPF: NORMAL /HPF (ref 0–10)
ERYTHROCYTE [DISTWIDTH] IN BLOOD BY AUTOMATED COUNT: 13.8 % (ref 11.7–15.4)
GLOBULIN SER CALC-MCNC: 2 G/DL (ref 1.5–4.5)
GLUCOSE SERPL-MCNC: 105 MG/DL (ref 65–99)
GLUCOSE UR QL: NEGATIVE
HCT VFR BLD AUTO: 44.2 % (ref 34–46.6)
HDLC SERPL-MCNC: 40 MG/DL
HGB BLD-MCNC: 15 G/DL (ref 11.1–15.9)
HGB UR QL STRIP: NEGATIVE
IMM GRANULOCYTES # BLD AUTO: 0 X10E3/UL (ref 0–0.1)
IMM GRANULOCYTES NFR BLD AUTO: 0 %
KETONES UR QL STRIP: NEGATIVE
LDLC SERPL CALC-MCNC: 91 MG/DL (ref 0–99)
LDLC/HDLC SERPL: 2.3 RATIO (ref 0–3.2)
LEUKOCYTE ESTERASE UR QL STRIP: NEGATIVE
LYMPHOCYTES # BLD AUTO: 1.4 X10E3/UL (ref 0.7–3.1)
LYMPHOCYTES NFR BLD AUTO: 24 %
MCH RBC QN AUTO: 33.3 PG (ref 26.6–33)
MCHC RBC AUTO-ENTMCNC: 33.9 G/DL (ref 31.5–35.7)
MCV RBC AUTO: 98 FL (ref 79–97)
MICRO URNS: NORMAL
MICRO URNS: NORMAL
MONOCYTES # BLD AUTO: 0.6 X10E3/UL (ref 0.1–0.9)
MONOCYTES NFR BLD AUTO: 11 %
NEUTROPHILS # BLD AUTO: 3.5 X10E3/UL (ref 1.4–7)
NEUTROPHILS NFR BLD AUTO: 61 %
NITRITE UR QL STRIP: NEGATIVE
PH UR STRIP: 6.5 [PH] (ref 5–7.5)
PLATELET # BLD AUTO: 206 X10E3/UL (ref 150–450)
POTASSIUM SERPL-SCNC: 4.8 MMOL/L (ref 3.5–5.2)
PROT SERPL-MCNC: 6.6 G/DL (ref 6–8.5)
PROT UR QL STRIP: NEGATIVE
RBC # BLD AUTO: 4.51 X10E6/UL (ref 3.77–5.28)
RBC #/AREA URNS HPF: NORMAL /HPF (ref 0–2)
SODIUM SERPL-SCNC: 140 MMOL/L (ref 134–144)
SP GR UR: 1.01 (ref 1–1.03)
TRIGL SERPL-MCNC: 155 MG/DL (ref 0–149)
UROBILINOGEN UR STRIP-MCNC: 0.2 MG/DL (ref 0.2–1)
VLDLC SERPL CALC-MCNC: 27 MG/DL (ref 5–40)
WBC # BLD AUTO: 5.7 X10E3/UL (ref 3.4–10.8)
WBC #/AREA URNS HPF: NORMAL /HPF (ref 0–5)

## 2021-11-10 ENCOUNTER — OFFICE VISIT (OUTPATIENT)
Dept: FAMILY MEDICINE CLINIC | Facility: CLINIC | Age: 76
End: 2021-11-10

## 2021-11-10 VITALS
OXYGEN SATURATION: 98 % | RESPIRATION RATE: 18 BRPM | HEART RATE: 80 BPM | TEMPERATURE: 96 F | SYSTOLIC BLOOD PRESSURE: 136 MMHG | DIASTOLIC BLOOD PRESSURE: 78 MMHG | BODY MASS INDEX: 30.82 KG/M2 | HEIGHT: 60 IN | WEIGHT: 157 LBS

## 2021-11-10 DIAGNOSIS — E66.9 OBESITY (BMI 30.0-34.9): ICD-10-CM

## 2021-11-10 DIAGNOSIS — R73.9 HYPERGLYCEMIA: ICD-10-CM

## 2021-11-10 DIAGNOSIS — E78.2 MIXED HYPERLIPIDEMIA: ICD-10-CM

## 2021-11-10 DIAGNOSIS — I10 ESSENTIAL HYPERTENSION: Primary | ICD-10-CM

## 2021-11-10 PROCEDURE — 99213 OFFICE O/P EST LOW 20 MIN: CPT | Performed by: INTERNAL MEDICINE

## 2021-11-16 NOTE — PROGRESS NOTES
Subjective   Jody Simmons is a 75 y.o. female. Patient is here today for   Chief Complaint   Patient presents with   • Hyperlipidemia     lab follow up, pt states she is till having leg muscle pain after twisting leg 2 weeks ago           Vitals:    11/10/21 1424   BP: 136/78   Pulse: 80   Resp: 18   Temp: 96 °F (35.6 °C)   SpO2: 98%     Body mass index is 30.66 kg/m².      Past Medical History:   Diagnosis Date   • Dyslipidemia    • Endometrial polyp 04/21/2015    benign    • Fibroid    • H/O pelvic mass    • Hyperlipidemia    • Osteopenia    • PMB (postmenopausal bleeding)    • UTI (urinary tract infection)       No Known Allergies   Social History     Socioeconomic History   • Marital status: Single   Tobacco Use   • Smoking status: Never Smoker   • Smokeless tobacco: Never Used   Substance and Sexual Activity   • Alcohol use: Yes     Comment: SOCIAL rare   • Drug use: No   • Sexual activity: Yes     Partners: Male     Birth control/protection: Post-menopausal        Current Outpatient Medications:   •  acetaminophen (TYLENOL) 325 MG tablet, Take 650 mg by mouth Every 6 (Six) Hours As Needed for Mild Pain ., Disp: , Rfl:   •  Ascorbic Acid (VITAMIN C PO), Take  by mouth Daily., Disp: , Rfl:   •  aspirin 81 MG tablet, Take  by mouth., Disp: , Rfl:   •  atorvastatin (LIPITOR) 10 MG tablet, Take 1 tablet by mouth Daily., Disp: 90 tablet, Rfl: 3  •  Calcium Carbonate-Vitamin D (CALTRATE 600+D PO), Take  by mouth., Disp: , Rfl:   •  Cholecalciferol (VITAMIN D) 1000 UNITS tablet, Take  by mouth., Disp: , Rfl:   •  Diclofenac Sodium (VOLTAREN) 1 % gel gel, Apply 4 g topically to the appropriate area as directed 4 (Four) Times a Day As Needed (pain)., Disp: 150 g, Rfl: 0  •  fluticasone (CUTIVATE) 0.05 % cream, Apply  topically to the appropriate area as directed 2 (Two) Times a Day., Disp: 30 g, Rfl: 0  •  losartan (COZAAR) 100 MG tablet, Take 1 tablet by mouth Daily., Disp: 90 tablet, Rfl: 3  •  Multiple Vitamin  (MULTI VITAMIN DAILY PO), Take  by mouth., Disp: , Rfl:   •  Multiple Vitamins-Minerals (ZINC PO), Take  by mouth Daily., Disp: , Rfl:      Objective     She has pain in her left thigh ever since twisting leg couple weeks ago although her pain is improved.  It does not make her limp, does not cause her to wake up in the middle the night.       Review of Systems   Constitutional: Negative.    HENT: Negative.    Respiratory: Negative.    Cardiovascular: Negative.    Genitourinary: Negative.    Musculoskeletal: Negative.         She has left thigh pain since twisting her leg a couple weeks ago.   Psychiatric/Behavioral: Negative.        Physical Exam  Vitals and nursing note reviewed.   Constitutional:       Appearance: Normal appearance. She is obese.      Comments: Pleasant, neatly groomed, no distress.   Cardiovascular:      Rate and Rhythm: Regular rhythm.      Heart sounds: Normal heart sounds. No murmur heard.  No gallop.    Pulmonary:      Effort: No respiratory distress.      Breath sounds: Normal breath sounds. No wheezing or rales.   Neurological:      Mental Status: She is alert and oriented to person, place, and time.   Psychiatric:         Mood and Affect: Mood normal.         Behavior: Behavior normal.         Thought Content: Thought content normal.           Problems Addressed this Visit        Cardiac and Vasculature    Mixed hyperlipidemia    Essential hypertension - Primary       Endocrine and Metabolic    Hyperglycemia    Obesity (BMI 30.0-34.9)      Diagnoses       Codes Comments    Essential hypertension    -  Primary ICD-10-CM: I10  ICD-9-CM: 401.9     Mixed hyperlipidemia     ICD-10-CM: E78.2  ICD-9-CM: 272.2     Hyperglycemia     ICD-10-CM: R73.9  ICD-9-CM: 790.29     Obesity (BMI 30.0-34.9)     ICD-10-CM: E66.9  ICD-9-CM: 278.00             PLAN  She and I reviewed her labs.  She has hyperglycemia.  She is obese with a BMI of 30.    I have asked her to do her best to lose weight via regular  aerobic activity per American Heart Association guidelines and to decrease her caloric intake with an eye towards losing weight.    Her hypertension is well controlled.    Her mixed hyperlipidemia is well controlled.    I asked her to let me know if her leg pain fails to resolve entirely.  In the meantime, she may take Tylenol as needed for pain.  If she finds that she fails to improve or gets worse in the meantime, she will let me know.    I asked her to follow-up in March.  No follow-ups on file.

## 2021-11-24 ENCOUNTER — TELEPHONE (OUTPATIENT)
Dept: FAMILY MEDICINE CLINIC | Facility: CLINIC | Age: 76
End: 2021-11-24

## 2021-11-24 DIAGNOSIS — M79.604 RIGHT LEG PAIN: Primary | ICD-10-CM

## 2021-11-30 ENCOUNTER — TELEPHONE (OUTPATIENT)
Dept: FAMILY MEDICINE CLINIC | Facility: CLINIC | Age: 76
End: 2021-11-30

## 2021-11-30 NOTE — TELEPHONE ENCOUNTER
PT IS NEEDING A DIAGNOSTIC MAMMO. PT WOULD LIKE ORDER PUT THROUGH BEFORE JAN. 1ST.    PT HAS TALKED TO THE DR. COLON AND WAITING FOR THE REFERRAL    PT # [id number]

## 2021-12-02 NOTE — TELEPHONE ENCOUNTER
Patient is needing an order for a diagnostic mammogram. This is a follow up from her mammogram she had done in 6 months. Patient would like this to be done by the end of the year. Can you please follow up on this.

## 2021-12-03 ENCOUNTER — TELEPHONE (OUTPATIENT)
Dept: FAMILY MEDICINE CLINIC | Facility: CLINIC | Age: 76
End: 2021-12-03

## 2021-12-03 DIAGNOSIS — R92.0 ABNORMAL FINDING ON MAMMOGRAPHY, MICROCALCIFICATION: Primary | ICD-10-CM

## 2021-12-03 NOTE — TELEPHONE ENCOUNTER
PATIENT CALLED AGAIN, SPOKE TO EMMA YESTERDAY, WAS GOING TO HAVE DR COLON TO PLACE AN ORDER FOR MAMMOGRAM DIAGNOSTIC, WAS TOLD SHE COULD CALL [facility] SCHEDULING TODAY. THEY TOLD HER THEY DON'T HAVE THE ORDER YET    PLEASE ADVISE    [phone]

## 2021-12-08 NOTE — TELEPHONE ENCOUNTER
This has been taken care of. It looks like the order was placed and patient is scheduled. Nothing further needed at this time.      Paco

## 2021-12-08 NOTE — TELEPHONE ENCOUNTER
This has been taken care of. Both orders for a mammogram and to physical therapy have been placed. Nothing further needed at this time.      Paco

## 2021-12-10 ENCOUNTER — TREATMENT (OUTPATIENT)
Dept: PHYSICAL THERAPY | Facility: CLINIC | Age: 76
End: 2021-12-10

## 2021-12-10 DIAGNOSIS — M79.604 RIGHT LEG PAIN: Primary | ICD-10-CM

## 2021-12-10 PROCEDURE — 97162 PT EVAL MOD COMPLEX 30 MIN: CPT | Performed by: PHYSICAL THERAPIST

## 2021-12-10 PROCEDURE — 97530 THERAPEUTIC ACTIVITIES: CPT | Performed by: PHYSICAL THERAPIST

## 2021-12-10 PROCEDURE — 97140 MANUAL THERAPY 1/> REGIONS: CPT | Performed by: PHYSICAL THERAPIST

## 2021-12-10 PROCEDURE — 97110 THERAPEUTIC EXERCISES: CPT | Performed by: PHYSICAL THERAPIST

## 2021-12-10 NOTE — PROGRESS NOTES
Physical Therapy Initial Evaluation and Plan of Care    Patient Name: Jody Simmons          :  1945  Referring Physician: Manolo Reddy MD  Diagnosis: Right leg pain [M79.604]    Date of Evaluation: 12/10/2021  ______________________________________________________________________    Subjective Evaluation    History of Present Illness  Mechanism of injury: Weedeating -  With slip on shoes - stepped back w/ RLE - landed on piece of landscape timber and it rolled, jerking (R) leg - kept working -   laterl the back of her calf started hurting and progressively got worse -   Used tylenol   Lateral foot started swelling - went to  - 2021 - mm? Told to take tylenol, heat, exercise - Saw Dr. Reddy - better, butstill painful   Leg feels like it will give way -   Walking ok, sleeping OK -      Patient Occupation: Retired -  -  Pain  Current pain ratin  At worst pain ratin  Location: Medial calf, posterior knee, distal / post thigh (R)  - swelling lateral ankle / foot - Stairs, climbing;    Alleviating factors: Tylenol, heat, rest -   Exacerbated by: WB-ing (R) LE -prolonged positioning; First thing in AM - uneven ground-   Progression: improved    Social Support  Patient lives at: Home w/ multiple stairs / steps -     Treatments  Current treatment comments: OTC tylenol, heat.     Patient Goals  Patient/family treatment goals: Pain alleviation; Normal gait, ADLs, etc RLE; Return to walking for exercise -          ___________________________________________________  Objective          Observations     Additional Knee Observation Details  Pt ambulating with mildly antalgic gait RLE w/ pain in medial gastroc region (prox)  Pain same w/ step up   Mildl swelling posterior knee/gastroc / lower leg (R)    Tenderness     Additional Tenderness Details  Tender medial gastroc mm belly - popliteal fossa - infrapatella, pes region, PF jt region -    Active Range of Motion   Left Knee    Normal active range of motion    Right Knee   Normal active range of motion    Strength/Myotome Testing     Left Knee   Normal strength    Right Knee   Flexion: 4  Extension: 4  Quadriceps contraction: fair    Additional Strength Details  Pain w/ resisted DF / Heel raise (R) -     Tests     Additional Tests Details  (+) Step Up and Mini Squat (PF Jt) (R) knee;           See Treatment Flow sheet for Exercises, Manual therapy, and modalities.   FUNCTIONAL ACTIVITIES: X 20 min  · TAPING / BRACING: K-tape to Unload Gastroc MM; 2) Unload PF Jt w/ extra lat component - (R)  · Anatomy / dysfunction education -   · Jt protection, ADL modification; use of heat, etc   ___________________________________________________  Assessment & Plan     Assessment    Assessment details: 76 yo w/ (R) Lower leg strain - (R) Gastroc strain; Knee pain   Taping reduced pain and improved gait -     PROBLEMS: Pain; Abnormal gait; Weakness; Intolerance to ADLs requiring use of RLE -   PROGNOSIS: Good    GOALS:   SHORT TERM GOALS: 2 weeks:  1) HEP Initiated; 2) Pain decreased 50%:   3) Gastroc strength / heel raise (B) w/ min discomfort  4) Improved gait / functional ability with taping;     LONG TERM GOALS: 4 weeks (or at time of DISCHARGE): 1) (I) HEP; 2) AROM WFL and pain free; 3) Strength / mobility to be able to perform all ADL's and job-related activities w/o restrictions; 4) Gait / endurance to allow ADLs and hobby activities -       Plan  Planned therapy interventions: flexibility, home exercise program, neuromuscular re-education, soft tissue mobilization, strengthening, stretching and therapeutic activities (Modalities prn; Taping prn; )  Frequency: 2x week  Duration in weeks: 4  Treatment plan discussed with: patient      ___________________________________________________  Manual Therapy:    08     mins  59541;  Therapeutic Exercise:    15     mins  42743;     Neuromuscular Luis A:        mins  22890;    Therapeutic Activity:     20      mins  29153;   Self Care:                           mins  71086  Ultrasound:     06     mins  70927;  Iontophoresis:          mins  96445;    Electrical Stimulation:         mins  57471 ( );  Mechanical Traction:          mins  04963  Dry Needling          mins self-pay    Eval:   20   mins    Timed Treatment:   49   mins                  Total Treatment:     75   mins    PT SIGNATURE:   Calvin Richardson, PT  DATE TREATMENT INITIATED: 12/10/2021  ___________________________________________________  Initial Certification  Certification Period: 3/10/2022  I certify that the therapy services are furnished while this patient is under my care.  The services outlined above are required by this patient, and will be reviewed every 90 days.     PHYSICIAN: ________________________________  DATE: ______  Manolo Reddy MD        Please sign and return via fax to [phone].. Thank you, [facility].  ______________________________________________________________________  [address] [place] [address]  Phone: [phone] Fax: [phone]

## 2021-12-15 ENCOUNTER — TREATMENT (OUTPATIENT)
Dept: PHYSICAL THERAPY | Facility: CLINIC | Age: 76
End: 2021-12-15

## 2021-12-15 DIAGNOSIS — M79.604 RIGHT LEG PAIN: Primary | ICD-10-CM

## 2021-12-15 PROCEDURE — 97035 APP MDLTY 1+ULTRASOUND EA 15: CPT | Performed by: PHYSICAL THERAPIST

## 2021-12-15 PROCEDURE — 97110 THERAPEUTIC EXERCISES: CPT | Performed by: PHYSICAL THERAPIST

## 2021-12-15 PROCEDURE — 97140 MANUAL THERAPY 1/> REGIONS: CPT | Performed by: PHYSICAL THERAPIST

## 2021-12-15 PROCEDURE — 97530 THERAPEUTIC ACTIVITIES: CPT | Performed by: PHYSICAL THERAPIST

## 2021-12-15 NOTE — PROGRESS NOTES
Physical Therapy Daily Progress Note    Patient Name: Jody Simmons         :  1945  Referring Physician: Manolo Reddy MD      Subjective   Jody Simmons reports: feeling better - much less pain walking and moving - much less ache - tape felt great -   Tape came loose - used paper - raked leaves this morning - Scared on steps -   Less swelling in ankle -     Objective   Tender medial gastroc mm belly (L);   Tender med/lat PF jt and medial joint line (L) knee  Pt demonstrating improved gait -   Demonstrated much weakness with stepping up w/ LLE - no pain, but difficult - used UE assist a lot -     See Exercise, Manual, and Modality Logs for complete treatment.     Functional / Therapeutic Activities:  20 min  · TAPING / BRACING: K-tape to 1) Unload Gastroc mm ; 2) Unload PF Jt w/ extra lateral component and 3) Unload medial joint (L) knee -   · Jt protection, ADL modification; Posture and      Assessment/Plan  76 yo w/ (R) Lower leg strain - (R) Gastroc strain; Knee pain   Taping reduced pain and improved gait -     Progress per Plan of Care       _________________________________________________    Manual Therapy:            10     mins  89207;  Therapeutic Exercise:    20    mins  72997;     Neuromuscular Luis A:        mins  78990;    Therapeutic Activity:     20      mins  18345;     Ultrasound:                    06      mins  81692;  Iontophoresis:                     mins  38572;    Electrical Stimulation:         mins  14531 ( );  Mechanical Traction:          mins  68041  Dry Needling                       mins self-pay     Timed Treatment:   58    mins                  Total Treatment:     65    mins    Calvin Richardson PT  Physical Therapist

## 2021-12-23 ENCOUNTER — TREATMENT (OUTPATIENT)
Dept: PHYSICAL THERAPY | Facility: CLINIC | Age: 76
End: 2021-12-23

## 2021-12-23 DIAGNOSIS — M79.604 RIGHT LEG PAIN: Primary | ICD-10-CM

## 2021-12-23 PROCEDURE — 97530 THERAPEUTIC ACTIVITIES: CPT | Performed by: PHYSICAL THERAPIST

## 2021-12-23 PROCEDURE — 97110 THERAPEUTIC EXERCISES: CPT | Performed by: PHYSICAL THERAPIST

## 2021-12-23 PROCEDURE — 97140 MANUAL THERAPY 1/> REGIONS: CPT | Performed by: PHYSICAL THERAPIST

## 2021-12-23 NOTE — PROGRESS NOTES
Physical Therapy Daily Progress Note    Patient Name: Jody Simmons         :  1945  Referring Physician: Manolo Reddy MD      Subjective   Jody Simmons reports: improving - pain in calf nonexistent this week - was more activ and noted increased pain medial knee (R0 after shopping -w/ a lot of walking and standing - Medial knee pain (R) - rested, tylenol, heat pad,- doing better today, but sore/ache medial knee -   Better going up/down stairs (Up better vs down (less confident going down) -    Objective   Tender medial joint line (R) knee and medial PF Jt -   Less tender medial gastroc mm belly, but much less intense and smaller area - (R)  Improved gait - mildly antalgic (R) due to knee -     See Exercise, Manual, and Modality Logs for complete treatment.     Functional / Therapeutic Activities:  25 min  · TAPING / BRACING: K-tape to 1) Unload Gastroc mm ; 2) Unload PF Jt w/ extra lateral component and 3) Unload medial joint (L) knee -   · SEE EXERCISE FLOW SHEET -   · Jt protection, ADL modification; Posture and       Assessment/Plan  74 yo w/ (R) Lower leg strain - (R) Gastroc strain; Knee pain   Taping very helpful in reducing / alleviating calf / knee pain and improving functional ability  -      Progress per Plan of Care     _________________________________________________     Manual Therapy:            08     mins  75147;  Therapeutic Exercise:    20    mins  41053;     Neuromuscular Luis A:        mins  33434;    Therapeutic Activity:     25      mins  03212;     Ultrasound:                    06      mins  08200;  Iontophoresis:                     mins  59725;    Electrical Stimulation:         mins  25272 ( );  Mechanical Traction:          mins  07413  Dry Needling                       mins self-pay     Timed Treatment:   59    mins                  Total Treatment:     70    mins    Calvin Richardson, PT  Physical Therapist

## 2021-12-30 ENCOUNTER — TREATMENT (OUTPATIENT)
Dept: PHYSICAL THERAPY | Facility: CLINIC | Age: 76
End: 2021-12-30

## 2021-12-30 DIAGNOSIS — M25.561 RIGHT KNEE PAIN, UNSPECIFIED CHRONICITY: ICD-10-CM

## 2021-12-30 DIAGNOSIS — M79.604 RIGHT LEG PAIN: Primary | ICD-10-CM

## 2021-12-30 PROCEDURE — 97140 MANUAL THERAPY 1/> REGIONS: CPT | Performed by: PHYSICAL THERAPIST

## 2021-12-30 PROCEDURE — 97530 THERAPEUTIC ACTIVITIES: CPT | Performed by: PHYSICAL THERAPIST

## 2021-12-30 PROCEDURE — 97110 THERAPEUTIC EXERCISES: CPT | Performed by: PHYSICAL THERAPIST

## 2021-12-30 PROCEDURE — 97035 APP MDLTY 1+ULTRASOUND EA 15: CPT | Performed by: PHYSICAL THERAPIST

## 2021-12-30 NOTE — PROGRESS NOTES
Physical Therapy Daily Progress Note    Patient Name: Jody Simmons         :  1945  Referring Physician: Manolo Reddy MD      Subjective   Jody Simmons reports: not doing as good - Swelling persists in lower leg and lateral foot - No pain in calf at all - Pain aches medial knee - persistent - Tape helpful - Stairs feel uneasy going down - knee stiff in AM, feels better after moving around, but pain increased w/ prolonged walking -   Pt says she does not want to do ball exercise -     Objective   Improved gait - swelling in lateral lower ankle and foot and anterior, medial knee (R) -   Very tender medial joint line (R) knee - Calf non-tender (R)   Improved gait -     See Exercise, Manual, and Modality Logs for complete treatment.     Functional / Therapeutic Activities:  20 min  · TAPING / BRACING: K-tape to 1)  Unload medial joint (L) knee -   · SEE EXERCISE FLOW SHEET -   · Discussed w/ Pt about seeking a referral for Ortho to assess her knee -  · Jt protection, ADL modification; Posture and       Assessment/Plan  74 yo w/ (R) Lower leg strain - (R) Gastroc strain; Knee pain   Taping very helpful in reducing / alleviating calf / knee pain and improving functional ability  -      Progress per Plan of Care - no green ball exercises -      _________________________________________________     Manual Therapy:            08     mins  44643;  Therapeutic Exercise:    20    mins  21980;     Neuromuscular Luis A:        mins  13005;    Therapeutic Activity:     20      mins  66982;     Ultrasound:                    06      mins  10703;  Iontophoresis:                     mins  53121;    Electrical Stimulation:         mins  91102 ( );  Mechanical Traction:          mins  50703  Dry Needling                       mins self-pay     Timed Treatment:   54    mins                  Total Treatment:     65   mins     Calvin Richardson PT  Physical Therapist

## 2022-01-05 ENCOUNTER — HOSPITAL ENCOUNTER (OUTPATIENT)
Dept: MAMMOGRAPHY | Facility: HOSPITAL | Age: 77
Discharge: HOME | End: 2022-01-05
Admitting: INTERNAL MEDICINE

## 2022-01-05 ENCOUNTER — HOSPITAL ENCOUNTER (OUTPATIENT)
Dept: ULTRASOUND IMAGING | Facility: HOSPITAL | Age: 77
End: 2022-01-05

## 2022-01-05 PROCEDURE — 77066 DX MAMMO INCL CAD BI: CPT

## 2022-01-05 PROCEDURE — G0279 TOMOSYNTHESIS, MAMMO: HCPCS

## 2022-01-11 DIAGNOSIS — M25.561 CHRONIC PAIN OF RIGHT KNEE: Primary | ICD-10-CM

## 2022-01-11 DIAGNOSIS — G89.29 CHRONIC PAIN OF RIGHT KNEE: Primary | ICD-10-CM

## 2022-01-13 ENCOUNTER — TREATMENT (OUTPATIENT)
Dept: PHYSICAL THERAPY | Facility: CLINIC | Age: 77
End: 2022-01-13

## 2022-01-13 DIAGNOSIS — M79.604 RIGHT LEG PAIN: Primary | ICD-10-CM

## 2022-01-13 DIAGNOSIS — M25.561 RIGHT KNEE PAIN, UNSPECIFIED CHRONICITY: ICD-10-CM

## 2022-01-13 PROCEDURE — 97530 THERAPEUTIC ACTIVITIES: CPT | Performed by: PHYSICAL THERAPIST

## 2022-01-13 PROCEDURE — 97110 THERAPEUTIC EXERCISES: CPT | Performed by: PHYSICAL THERAPIST

## 2022-01-13 NOTE — PROGRESS NOTES
Physical Therapy Daily Progress Note  RE-ASSESSMENT -     Patient Name: Jody Simmons         :  1945  Referring Physician: Manolo Reddy MD      Subjective   Jody Simmons reports: Minimal calf soreness  - Medial knee pain (R) much improved - much less swelling in ankle (R) -   Concerned w/ how she is going down the stairs at home - when she puts her right leg down she says her right leg is going to give out when stepping down - Concerned with her balance especially in morning -   To see Dr. Echeverria for her knee (R) -   Able to walk longer, but not able to go around the park multiple laps yet at home like she did before -     Objective   Much less tender medial knee (R)   Pt demonstrates normal gait up stairs reciprocally, but guarded w/ going down when leading w/ LLE - Pt notes pain anterior knee (R)  -     See Exercise, Manual, and Modality Logs for complete treatment.     Functional / Therapeutic Activities:  23 min  · TAPING / BRACING: K-tape to 1)  Unload medial joint (L) knee - 2) Unload PF jt (added to address pain going down stairs)   · SEE EXERCISE FLOW SHEET -   · FUNCTIONAL ASSESSMENT   · Jt protection, ADL modification; Posture and       Assessment/Plan  74 yo w/ (R) Lower leg strain - (R) Gastroc strain; Knee pain   Taping very helpful in reducing / alleviating calf / knee pain and improving functional ability  -   GOALS STATUS:   STG: All met  LTG: Progressing towards Function goals - others met -   Jody would benefit from continued Physical Therapy to address remaining functional goals and pain relief -including ambulating down stairs and walking distances / endurance for ADLs and fitness -      Progress per Plan of Care - no green ball exercises - Continue 1x/wk increase to 2 if needed -      _________________________________________________     Manual Therapy:                 mins  64583;  Therapeutic Exercise:    22    mins  35111;     Neuromuscular Luis A:  06      mins   36118;    Therapeutic Activity:     25      mins  42655;     Ultrasound:                          mins  60297;  Iontophoresis:                     mins  07582;    Electrical Stimulation:         mins  96084 ( );  Mechanical Traction:          mins  57058  Dry Needling                       mins self-pay     Timed Treatment:   53    mins                  Total Treatment:     60   mins    Calvin Richardson PT  Physical Therapist

## 2022-01-20 ENCOUNTER — TREATMENT (OUTPATIENT)
Dept: PHYSICAL THERAPY | Facility: CLINIC | Age: 77
End: 2022-01-20

## 2022-01-20 DIAGNOSIS — M25.561 RIGHT KNEE PAIN, UNSPECIFIED CHRONICITY: ICD-10-CM

## 2022-01-20 DIAGNOSIS — M79.604 RIGHT LEG PAIN: Primary | ICD-10-CM

## 2022-01-20 PROCEDURE — 97110 THERAPEUTIC EXERCISES: CPT | Performed by: PHYSICAL THERAPIST

## 2022-01-20 PROCEDURE — 97530 THERAPEUTIC ACTIVITIES: CPT | Performed by: PHYSICAL THERAPIST

## 2022-01-20 NOTE — PROGRESS NOTES
Physical Therapy Daily Progress Note    Patient Name: Jody Simmons         :  1945  Referring Physician: Manolo Reddy MD      Subjective   Jody Simmons reports: no pain in calf any more - no more posterior knee pain - intermittent medial knee pain (R)  -   To see Dr. Echeverria - 2022 -   Down stairs still difficult - her step is very narrow -     Objective   Tender medial knee joint (R) - Non-tender gastroc mm belly      See Exercise, Manual, and Modality Logs for complete treatment.     Functional / Therapeutic Activities:    · TAPING / BRACING: K-tape to Unload medial jt   · Jt protection, ADL modification; Posture and      Assessment/Plan  76 yo w/ (R) Lower leg strain - (R) Gastroc strain; Knee pain   Much improved w/ alleviation of lower leg pain and less knee pain, but persistent medial knee pain - Pt would benefit from further assessment for medial knee pain - probable OA, etc   Taping very helpful in reducing pain and improving functional ability     Progress per Plan of Care       _________________________________________________    Manual Therapy:                 mins  96549;  Therapeutic Exercise:    28    mins  11591;     Neuromuscular Luis A:        mins  43344;    Therapeutic Activity:     15      mins  62304;     Ultrasound:                          mins  48675;  Iontophoresis:                     mins  21518;    Electrical Stimulation:         mins  94389 ( );  Mechanical Traction:          mins  94282  Dry Needling                       mins self-pay     Timed Treatment:   43    mins                  Total Treatment:     60    mins    Calvin Richardson PT  Physical Therapist

## 2022-01-21 DIAGNOSIS — R92.8 BREAST LESION ON MAMMOGRAPHY: Primary | ICD-10-CM

## 2022-01-27 ENCOUNTER — TREATMENT (OUTPATIENT)
Dept: PHYSICAL THERAPY | Facility: CLINIC | Age: 77
End: 2022-01-27

## 2022-01-27 DIAGNOSIS — M79.604 RIGHT LEG PAIN: Primary | ICD-10-CM

## 2022-01-27 DIAGNOSIS — M25.561 RIGHT KNEE PAIN, UNSPECIFIED CHRONICITY: ICD-10-CM

## 2022-01-27 PROCEDURE — 97110 THERAPEUTIC EXERCISES: CPT | Performed by: PHYSICAL THERAPIST

## 2022-01-27 PROCEDURE — 97530 THERAPEUTIC ACTIVITIES: CPT | Performed by: PHYSICAL THERAPIST

## 2022-01-27 NOTE — PROGRESS NOTES
Physical Therapy Daily Progress Note    Patient Name: Jody Simmons         :  1945  Referring Physician: Manolo Reddy MD      Subjective   Jody Simmons reports: continued improvement - tape helpful  - Steps still a problem, but getting better - unsure that (R) knee will give out - to see Dr. Echeverria next week -     Objective   Pt ambulating w/ improved gait - Less tender medial joint line (R) knee - Non tender calf / lower leg musculature -     See Exercise, Manual, and Modality Logs for complete treatment.     Functional / Therapeutic Activities:    · TAPING / BRACING: K-tape to 1) unload medial knee joint; 2) Lat C to prevent lat pat glide (R) knee  · Instructed Pt in self-taping, demonstrated same and then Pt practiced multiple times - Video  · Jt protection, ADL modification; Posture and      Assessment/Plan  74 yo w/ (R) Lower leg strain - (R) Gastroc strain; Knee pain   Much improved w/ alleviation of lower leg pain and less knee pain, but persistent medial knee pain - Pt would benefit from further assessment for medial knee pain - probable OA, etc   Taping very helpful in reducing pain and improving functional ability      Progress per Plan of Care     _________________________________________________     Manual Therapy:                 mins  91019;  Therapeutic Exercise:    23    mins  96336;     Neuromuscular Luis A:        mins  68018;    Therapeutic Activity:     30      mins  11925;     Ultrasound:                          mins  63663;  Iontophoresis:                     mins  06755;    Electrical Stimulation:         mins  15493 ( );  Mechanical Traction:          mins  54456  Dry Needling                       mins self-pay     Timed Treatment:   53    mins                  Total Treatment:     60    mins    Calvin Richardson, PT  Physical Therapist

## 2022-02-01 ENCOUNTER — OFFICE VISIT (OUTPATIENT)
Dept: ORTHOPEDIC SURGERY | Facility: CLINIC | Age: 77
End: 2022-02-01

## 2022-02-01 VITALS — HEIGHT: 60 IN | WEIGHT: 157 LBS | BODY MASS INDEX: 30.82 KG/M2 | TEMPERATURE: 97 F

## 2022-02-01 DIAGNOSIS — R52 PAIN: Primary | ICD-10-CM

## 2022-02-01 DIAGNOSIS — M17.11 PRIMARY LOCALIZED OSTEOARTHROSIS OF RIGHT LOWER LEG: ICD-10-CM

## 2022-02-01 PROCEDURE — 73562 X-RAY EXAM OF KNEE 3: CPT | Performed by: ORTHOPAEDIC SURGERY

## 2022-02-01 PROCEDURE — 99203 OFFICE O/P NEW LOW 30 MIN: CPT | Performed by: ORTHOPAEDIC SURGERY

## 2022-02-10 ENCOUNTER — TREATMENT (OUTPATIENT)
Dept: PHYSICAL THERAPY | Facility: CLINIC | Age: 77
End: 2022-02-10

## 2022-02-10 DIAGNOSIS — M25.561 RIGHT KNEE PAIN, UNSPECIFIED CHRONICITY: ICD-10-CM

## 2022-02-10 DIAGNOSIS — M79.604 RIGHT LEG PAIN: Primary | ICD-10-CM

## 2022-02-10 PROCEDURE — 97530 THERAPEUTIC ACTIVITIES: CPT | Performed by: PHYSICAL THERAPIST

## 2022-02-10 PROCEDURE — 97112 NEUROMUSCULAR REEDUCATION: CPT | Performed by: PHYSICAL THERAPIST

## 2022-02-10 PROCEDURE — 97110 THERAPEUTIC EXERCISES: CPT | Performed by: PHYSICAL THERAPIST

## 2022-02-10 NOTE — PROGRESS NOTES
Physical Therapy Daily Progress Note    Patient Name: Jody Simmons         :  1945  Referring Physician: Manolo Reddy MD      Subjective   Jody Simmons reports: saw dr Echeverria - took Xrays  and received a Cortizone shot - minimal pain - improved balance, improved ability to do stairs - as well - Minimal medial  Knee pain - no lower leg pain and no swelling lateral ankle / foot -     Objective    Pt ambulating with increased WB-ing RLE - Mild swelling ant/medial knee joint - bruise from cortizone injection - Non-tender medial knee jt (R) -   Pt demonstrated improved pace with reciprocal ambulation up/down stairs w/o pain     See Exercise, Manual, and Modality Logs for complete treatment.     Functional / Therapeutic Activities:    · TAPING / BRACING: K-tape to 1) unload medial knee joint; 2) Lat C to prevent lat pat glide (R) knee  · SEE EXERCISE FLOW SHEET -   · Jt protection, ADL modification; Posture and       Assessment/Plan  74 yo w/ (R) Lower leg strain - (R) Gastroc strain; Knee pain   Much improved w/ alleviation of lower leg pain and less knee pain, but persistent medial knee pain -   Taping very helpful in reducing pain and improving functional ability      Progress per Plan of Care - anticipate DC to HEP after last 2 visits -      _________________________________________________     Manual Therapy:                 mins  29002;  Therapeutic Exercise:    08    mins  32550;     Neuromuscular Luis A:    08    mins  62338;    Therapeutic Activity:     23      mins  54562;     Ultrasound:                          mins  42186;  Iontophoresis:                     mins  26560;    Electrical Stimulation:         mins  61767 ( );  Mechanical Traction:          mins  46935  Dry Needling                       mins self-pay     Timed Treatment:   39    mins                  Total Treatment:     50    mins    Calvin Richardson PT  Physical Therapist

## 2022-02-14 RX ORDER — ATORVASTATIN CALCIUM 10 MG/1
10 TABLET, FILM COATED ORAL DAILY
Qty: 90 TABLET | Refills: 3 | Status: SHIPPED | OUTPATIENT
Start: 2022-02-14 | End: 2023-01-31 | Stop reason: SDUPTHER

## 2022-02-14 RX ORDER — LOSARTAN POTASSIUM 100 MG/1
100 TABLET ORAL DAILY
Qty: 90 TABLET | Refills: 3 | Status: SHIPPED | OUTPATIENT
Start: 2022-02-14 | End: 2023-01-31 | Stop reason: SDUPTHER

## 2022-02-17 ENCOUNTER — TREATMENT (OUTPATIENT)
Dept: PHYSICAL THERAPY | Facility: CLINIC | Age: 77
End: 2022-02-17

## 2022-02-17 DIAGNOSIS — M25.561 RIGHT KNEE PAIN, UNSPECIFIED CHRONICITY: ICD-10-CM

## 2022-02-17 DIAGNOSIS — M54.50 RIGHT-SIDED LOW BACK PAIN WITHOUT SCIATICA, UNSPECIFIED CHRONICITY: ICD-10-CM

## 2022-02-17 DIAGNOSIS — M79.604 RIGHT LEG PAIN: Primary | ICD-10-CM

## 2022-02-17 PROCEDURE — 97530 THERAPEUTIC ACTIVITIES: CPT | Performed by: PHYSICAL THERAPIST

## 2022-02-17 PROCEDURE — 97110 THERAPEUTIC EXERCISES: CPT | Performed by: PHYSICAL THERAPIST

## 2022-02-17 PROCEDURE — 97112 NEUROMUSCULAR REEDUCATION: CPT | Performed by: PHYSICAL THERAPIST

## 2022-02-17 NOTE — PROGRESS NOTES
Physical Therapy Daily Progress Note    Patient Name: Jody Simmons         :  1945  Referring Physician: Manolo Reddy MD      Subjective   Jody Simmons reports: no pain in calf (R) - minimal swelling in lower leg / foot - Persistent mdial knee pain (R) - Pain in (R) buttock (R) mostly in am and after getting up - House cleaning and moving furniture -   To have biopsies tomorrow - -     Objective   (R) [place] / ASIS / PSIS higher vs (L)  Tender (R) SI region -     See Exercise, Manual, and Modality Logs for complete treatment.     Functional / Therapeutic Activities:    · TAPING / BRACING: K-tape to 1) unload medial knee joint; 2) Lat C to prevent lat pat glide (R) knee  · SEE EXERCISE FLOW SHEET -   · LB/SI assessment -   · Jt protection, ADL modification; Posture and       Assessment/Plan  74 yo w/ (R) Lower leg strain - (R) Gastroc strain; Knee pain  - LB strain / SI Jt dysfunction   Much improved w/ alleviation of lower leg pain and less knee pain, but persistent medial knee pain -   Taping very helpful in reducing pain and improving functional ability   Improved LBP and pelvic alignment after MT - which reduces stress to (R) knee and improves functional abilty      Progress per Plan of Care -  -      _________________________________________________     Manual Therapy:                 mins  13376;  Therapeutic Exercise:   20    mins  77085;     Neuromuscular Luis A:   06    mins  44218;    Therapeutic Activity:     28      mins  40266;     Ultrasound:                          mins  89840;  Iontophoresis:                     mins  77101;    Electrical Stimulation:         mins  10298 ( );  Mechanical Traction:          mins  07870  Dry Needling                       mins self-pay     Timed Treatment:   54    mins                  Total Treatment:     65    mins      Calvin Richardson PT  Physical Therapist

## 2022-02-18 ENCOUNTER — HOSPITAL ENCOUNTER (OUTPATIENT)
Dept: MAMMOGRAPHY | Facility: HOSPITAL | Age: 77
Discharge: HOME | End: 2022-02-18

## 2022-02-18 VITALS
HEIGHT: 60 IN | RESPIRATION RATE: 18 BRPM | BODY MASS INDEX: 30.82 KG/M2 | HEART RATE: 76 BPM | DIASTOLIC BLOOD PRESSURE: 68 MMHG | SYSTOLIC BLOOD PRESSURE: 134 MMHG | TEMPERATURE: 98.2 F | WEIGHT: 157 LBS | OXYGEN SATURATION: 98 %

## 2022-02-18 DIAGNOSIS — R92.8 ABNORMAL MAMMOGRAM OF LEFT BREAST: ICD-10-CM

## 2022-02-18 PROCEDURE — 88342 IMHCHEM/IMCYTCHM 1ST ANTB: CPT | Performed by: INTERNAL MEDICINE

## 2022-02-18 PROCEDURE — 88305 TISSUE EXAM BY PATHOLOGIST: CPT | Performed by: INTERNAL MEDICINE

## 2022-02-18 PROCEDURE — 88360 TUMOR IMMUNOHISTOCHEM/MANUAL: CPT | Performed by: INTERNAL MEDICINE

## 2022-02-18 PROCEDURE — 0 LIDOCAINE 1 % SOLUTION: Performed by: INTERNAL MEDICINE

## 2022-02-18 PROCEDURE — 88341 IMHCHEM/IMCYTCHM EA ADD ANTB: CPT | Performed by: INTERNAL MEDICINE

## 2022-02-18 RX ORDER — LIDOCAINE HYDROCHLORIDE 10 MG/ML
1 INJECTION, SOLUTION INFILTRATION; PERINEURAL ONCE
Status: COMPLETED | OUTPATIENT
Start: 2022-02-18 | End: 2022-02-18

## 2022-02-18 RX ADMIN — Medication 2 ML: at 13:41

## 2022-02-18 RX ADMIN — LIDOCAINE HYDROCHLORIDE,EPINEPHRINE BITARTRATE 10 ML: 10; .01 INJECTION, SOLUTION INFILTRATION; PERINEURAL at 13:04

## 2022-02-18 RX ADMIN — Medication 1 ML: at 13:04

## 2022-02-18 RX ADMIN — SODIUM BICARBONATE 12 ML: 84 INJECTION, SOLUTION INTRAVENOUS at 13:42

## 2022-02-18 RX ADMIN — Medication 1 ML: at 13:17

## 2022-02-18 RX ADMIN — LIDOCAINE HYDROCHLORIDE,EPINEPHRINE BITARTRATE 10 ML: 10; .01 INJECTION, SOLUTION INFILTRATION; PERINEURAL at 14:30

## 2022-02-18 NOTE — NURSING NOTE
Biopsy site to right upper inner breast clear with Dermabond dry and intact to both skin nicks. Only one biopsy on right breast, but patient moved ad the breast slid out of compression, so it had to be repositioned and re-targeted. No firmness or swelling noted at or around biopsy site. Biopsy sites x2 to left outer mid breast clear with Dermabond dry and intact to both sites. No firmness or swelling noted at or around either biopsy site. Denies pain. Ice packs with protective coverings applied to biopsy sites on both breasts. Discharge instructions discussed with understanding voiced by patient. Copies provided to patient. No distress noted. To home via private vehicle.

## 2022-02-18 NOTE — H&P
Name: Jody Simmons ADMIT: (Not on file)   : 1945  PCP: Manolo Reddy MD    MRN: [id number] LOS: 0 days   AGE/SEX: 76 y.o. female  ROOM: Room/bed info not found       Chief complaint Bilat breast calcs and L AD    Present Illness or Internal History:  Patient is a 76 y.o. female presents with bilat breast calcs and L AD for 2 site left and 1 site R stereotactic bx.     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    Family History:  Family History   Problem Relation Age of Onset   • Diabetes Mother    • Heart disease Father    • Diabetes Brother    • Cancer Brother         prostate       Social History:  Social History     Tobacco Use   • Smoking status: Never Smoker   • Smokeless tobacco: Never Used   Vaping Use   • Vaping Use: Never used   Substance Use Topics   • Alcohol use: Yes     Alcohol/week: 1.0 standard drink     Types: 1 Glasses of wine per week     Comment: SOCIAL rare   • Drug use: No        Objective     Physical Exam:    No exam performed today,    Vital Signs  Temp:  [98.2 °F (36.8 °C)] 98.2 °F (36.8 °C)  Heart Rate:  [83] 83  Resp:  [18] 18  BP: (140)/(71) 140/71    Anticipated Surgical Procedure:  Left breast stereotactic core needle biopsy x 2, Right breast stereotactic core needle biopsy    The risks, benefits and alternatives of this procedure have been discussed with the patient or responsible party: Yes        Tanisha Ace MD  22  12:43  EST

## 2022-02-19 ENCOUNTER — TELEPHONE (OUTPATIENT)
Dept: INTERVENTIONAL RADIOLOGY/VASCULAR | Facility: HOSPITAL | Age: 77
End: 2022-02-19

## 2022-02-21 LAB
LAB AP CASE REPORT: NORMAL
LAB AP CASE REPORT: NORMAL
LAB AP SPECIAL STAINS: NORMAL
LAB AP SPECIAL STAINS: NORMAL
LAB AP SYNOPTIC CHECKLIST: NORMAL
LAB AP SYNOPTIC CHECKLIST: NORMAL
PATH REPORT.FINAL DX SPEC: NORMAL
PATH REPORT.FINAL DX SPEC: NORMAL
PATH REPORT.GROSS SPEC: NORMAL
PATH REPORT.GROSS SPEC: NORMAL

## 2022-02-24 ENCOUNTER — TREATMENT (OUTPATIENT)
Dept: PHYSICAL THERAPY | Facility: CLINIC | Age: 77
End: 2022-02-24

## 2022-02-24 DIAGNOSIS — M25.561 RIGHT KNEE PAIN, UNSPECIFIED CHRONICITY: Primary | ICD-10-CM

## 2022-02-24 PROCEDURE — 97110 THERAPEUTIC EXERCISES: CPT | Performed by: PHYSICAL THERAPIST

## 2022-02-24 PROCEDURE — 97530 THERAPEUTIC ACTIVITIES: CPT | Performed by: PHYSICAL THERAPIST

## 2022-02-24 NOTE — PROGRESS NOTES
Physical Therapy Daily Progress Note  RE-ASSESSMENT / DISCHARGE    Patient Name: Jody Simmons         :  1945  Referring Physician: Manolo Reddy MD  DX; (R) knee pain  Start Date: 12/10/2021  Todays Date: 2022  Total Visits: 10    Subjective   Jody Simmons reports: biopsy very painful w/ lots of bruising - ductoal carcinoma, early stae?   Doing much better Knee pain and much improved after pelvic alignment much improved    Knee doing good -   Because of biopsy, very sore to raise arms -   Notes minimal / no pain in (R) knee this week - notes she is walking faster and is going up/down the stairs normally and with increased pace w/o pain - weather has prevented her from walking at the park - Notes no pain in lower leg nor swelling in lower leg and ankle -   Feels like she could continue w/ her HEP -     Objective   Pt ambulating w/ normal gait - Non-tender medial and ant/med joint line (R) knee - Full AROM -   Normal strength - Pt able to ambulate up/down stairs normally / reciprocally at normal pace-     See Exercise, Manual, and Modality Logs for complete treatment.     Functional / Therapeutic Activities:  23 min  · TAPING / BRACING: K-tape to 1)  Unload medial joint (L) knee - 2) Unload PF jt (added to address pain going down stairs)   · SEE EXERCISE FLOW SHEET -   · FUNCTIONAL ASSESSMENT   · Jt protection, ADL modification; Posture and       Assessment/Plan  76 yo w/ (R) Lower leg strain - (R) Gastroc strain; Knee pain   Taping very helpful in reducing / alleviating calf / knee pain and improving functional ability  -   Full resolution of pain and restoration of functional ability -   GOALS STATUS:   STG: All met  LTG: ALL met  Jody would benefit from continuing her HEP and discontinuing formal Physical Therapy and progress back to her park walking gradually as weather allows -      Plan: DC PT to HEP - Pt to call if pain returns -       _________________________________________________     Manual Therapy:                 mins  03808;  Therapeutic Exercise:    22    mins  51065;     Neuromuscular Luis A:  06      mins  90957;    Therapeutic Activity:     25      mins  61775;     Ultrasound:                          mins  63145;  Iontophoresis:                     mins  27755;    Electrical Stimulation:         mins  55116 ( );  Mechanical Traction:          mins  73974  Dry Needling                       mins self-pay     Timed Treatment:   53    mins                  Total Treatment:     65   mins      Calvin Richardson PT  Physical Therapist

## 2022-02-28 ENCOUNTER — OFFICE VISIT (OUTPATIENT)
Dept: FAMILY MEDICINE CLINIC | Facility: CLINIC | Age: 77
End: 2022-02-28

## 2022-02-28 VITALS
DIASTOLIC BLOOD PRESSURE: 80 MMHG | TEMPERATURE: 96 F | HEIGHT: 60 IN | WEIGHT: 158 LBS | BODY MASS INDEX: 31.02 KG/M2 | RESPIRATION RATE: 18 BRPM | SYSTOLIC BLOOD PRESSURE: 132 MMHG | HEART RATE: 78 BPM | OXYGEN SATURATION: 94 %

## 2022-02-28 DIAGNOSIS — C50.812 BILATERAL MALIGNANT NEOPLASM OF OVERLAPPING SITES OF BREAST IN FEMALE, UNSPECIFIED ESTROGEN RECEPTOR STATUS: Primary | ICD-10-CM

## 2022-02-28 DIAGNOSIS — C50.811 BILATERAL MALIGNANT NEOPLASM OF OVERLAPPING SITES OF BREAST IN FEMALE, UNSPECIFIED ESTROGEN RECEPTOR STATUS: Primary | ICD-10-CM

## 2022-02-28 PROCEDURE — 99213 OFFICE O/P EST LOW 20 MIN: CPT | Performed by: INTERNAL MEDICINE

## 2022-02-28 NOTE — PROGRESS NOTES
Subjective   Jody Simmons is a 76 y.o. female. Patient is here today for   Chief Complaint   Patient presents with   • Follow-up     discuss results of breast biopsy           Vitals:    02/28/22 0910   BP: 132/80   Pulse: 78   Resp: 18   Temp: 96 °F (35.6 °C)   SpO2: 94%     Body mass index is 30.86 kg/m².      Past Medical History:   Diagnosis Date   • Bilateral malignant neoplasm of overlapping sites of breast in female (HCC) 2/28/2022   • Dyslipidemia    • Endometrial polyp 04/21/2015    benign    • Fibroid    • Frozen shoulder 1980s   • H/O pelvic mass    • Hyperlipidemia    • Osteopenia    • PMB (postmenopausal bleeding)    • UTI (urinary tract infection)       No Known Allergies   Social History     Socioeconomic History   • Marital status: Single   Tobacco Use   • Smoking status: Never Smoker   • Smokeless tobacco: Never Used   Vaping Use   • Vaping Use: Never used   Substance and Sexual Activity   • Alcohol use: Yes     Alcohol/week: 1.0 standard drink     Types: 1 Glasses of wine per week     Comment: SOCIAL rare   • Drug use: No   • Sexual activity: Not Currently     Partners: Male     Birth control/protection: Post-menopausal        Current Outpatient Medications:   •  acetaminophen (TYLENOL) 325 MG tablet, Take 650 mg by mouth Every 6 (Six) Hours As Needed for Mild Pain ., Disp: , Rfl:   •  Ascorbic Acid (VITAMIN C PO), Take  by mouth Daily., Disp: , Rfl:   •  aspirin 81 MG tablet, Take  by mouth., Disp: , Rfl:   •  atorvastatin (LIPITOR) 10 MG tablet, Take 1 tablet by mouth Daily., Disp: 90 tablet, Rfl: 3  •  Calcium Carbonate-Vitamin D (CALTRATE 600+D PO), Take  by mouth., Disp: , Rfl:   •  Cholecalciferol (VITAMIN D) 1000 UNITS tablet, Take  by mouth., Disp: , Rfl:   •  Diclofenac Sodium (VOLTAREN) 1 % gel gel, Apply 4 g topically to the appropriate area as directed 4 (Four) Times a Day As Needed (pain)., Disp: 150 g, Rfl: 0  •  losartan (COZAAR) 100 MG tablet, Take 1 tablet by mouth Daily., Disp:  90 tablet, Rfl: 3  •  Multiple Vitamin (MULTI VITAMIN DAILY PO), Take  by mouth., Disp: , Rfl:   •  Multiple Vitamins-Minerals (ZINC PO), Take  by mouth Daily., Disp: , Rfl:      Objective     This patient had some breast biopsies on February 18.  She is here to discuss the results and to formulate a plan.    She had a left outer breast biopsy, biopsy in the left breast at 12:00 and biopsy of her right breast biopsy in the left breast, right breast, and again of the left breast at the 12 o'clock position were done.  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               Review of Systems   Constitutional: Negative.    HENT: Negative.    Respiratory: Negative.    Cardiovascular: Negative.    Musculoskeletal: Negative.    Psychiatric/Behavioral: Negative.        Physical Exam  Vitals and nursing note reviewed.   Constitutional:       Appearance: Normal appearance. She is obese.      Comments: Pleasant, neatly groomed, in no distress.   Cardiovascular:      Rate and Rhythm: Regular rhythm.      Heart sounds: Normal heart sounds. No murmur heard.  No gallop.    Pulmonary:      Effort: No respiratory distress.      Breath sounds: Normal breath sounds. No wheezing or rales.   Neurological:      Mental Status: She is alert and oriented to person, place, and time.   Psychiatric:         Mood and Affect: Mood normal.         Behavior: Behavior normal.         Thought Content: Thought content normal.           Problems Addressed this Visit        Hematology and Neoplasia    Bilateral malignant neoplasm of overlapping sites of breast in female (HCC) - Primary      Diagnoses       Codes Comments    Bilateral malignant neoplasm of overlapping sites of breast in female, unspecified estrogen receptor status (HCC)    -  Primary ICD-10-CM: C50.811, C50.812  ICD-9-CM: 174.8              PLAN  1 referred her to general surgery Dr. Bundy, and to oncology.     She had hoped that I would make a referral for her to surgical specialists so she could get one opinion as well as a second opinion. I will refer her to Dr. Toscano as well.  No follow-ups on file.  Answers for HPI/ROS submitted by the patient on 2/26/2022  Please describe your symptoms.: Discuss breast biopsies  & bruising  Have you had these symptoms before?: No  How long have you been having these symptoms?: 1-2 weeks  Please list any medications you are currently taking for this condition.: Tylenol  Please describe any probable cause for these symptoms. : Biopies of both  breasts  What is the primary reason for your visit?: Other

## 2022-03-02 ENCOUNTER — TELEPHONE (OUTPATIENT)
Dept: ONCOLOGY | Facility: CLINIC | Age: 77
End: 2022-03-02

## 2022-03-02 ENCOUNTER — TELEPHONE (OUTPATIENT)
Dept: SURGERY | Facility: CLINIC | Age: 77
End: 2022-03-02

## 2022-03-02 NOTE — TELEPHONE ENCOUNTER
Recieved referral from Dr. Reddy's office, patient is seeing Dr Bundy on 3/7/2022.  Called and talked to patient and she is comfortable keeping appointment with Dr. Bundy, no appointment with Dr. Toscano is necessary.

## 2022-03-02 NOTE — TELEPHONE ENCOUNTER
spoke to the pt she is aware of the MRI as well as genetic testing being dont in the office.spoke to the pt she is aware of the MRI as well as genetic testing being dont in the office.

## 2022-03-02 NOTE — TELEPHONE ENCOUNTER
Caller: IrisJody    Relationship: Self    Best call back number: [phone]    What is the best time to reach you: ANYTIME    Who are you requesting to speak with (clinical staff, provider,  specific staff member): DR TOBAR OR NURSE    What was the call regarding: PT IS HAVING LABS PERFORMED AT 9 AM ON 3/8 WITH HER INTERNAL MED DR. SHE WAS WONDERING IF THESE WOULD BE THE SAME LABS WE WOULD BE DRAWING LATER THAT AFTERNOON AND IF SHE COULD HAVE LABS ONLY ONCE THAT DAY. PLEASE CALL PT TO ADVISE.     Do you require a callback: YES

## 2022-03-02 NOTE — TELEPHONE ENCOUNTER
Returned call to patient who stated that she is having a CBC, CMP and Lipid panel on 3/8/2022  At PCP office, will she still need any other labs performed at our office on that date.  If so, she is willing to have them done.  If she does not need labs on that date at our office, just cancel the lab appointment before Dr. Travis's appointment.  She is trying to avoid duplication of lab if possible.

## 2022-03-07 ENCOUNTER — NURSE NAVIGATOR (OUTPATIENT)
Dept: OTHER | Facility: HOSPITAL | Age: 77
End: 2022-03-07

## 2022-03-07 ENCOUNTER — OFFICE VISIT (OUTPATIENT)
Dept: SURGERY | Facility: CLINIC | Age: 77
End: 2022-03-07

## 2022-03-07 VITALS — WEIGHT: 159 LBS | HEIGHT: 60 IN | BODY MASS INDEX: 31.22 KG/M2

## 2022-03-07 DIAGNOSIS — R73.9 HYPERGLYCEMIA: ICD-10-CM

## 2022-03-07 DIAGNOSIS — C50.912 BILATERAL MALIGNANT NEOPLASM OF BREAST IN FEMALE, UNSPECIFIED ESTROGEN RECEPTOR STATUS, UNSPECIFIED SITE OF BREAST: Primary | ICD-10-CM

## 2022-03-07 DIAGNOSIS — E78.2 MIXED HYPERLIPIDEMIA: Primary | ICD-10-CM

## 2022-03-07 DIAGNOSIS — C50.911 BILATERAL MALIGNANT NEOPLASM OF BREAST IN FEMALE, UNSPECIFIED ESTROGEN RECEPTOR STATUS, UNSPECIFIED SITE OF BREAST: Primary | ICD-10-CM

## 2022-03-07 PROCEDURE — 99203 OFFICE O/P NEW LOW 30 MIN: CPT | Performed by: STUDENT IN AN ORGANIZED HEALTH CARE EDUCATION/TRAINING PROGRAM

## 2022-03-07 NOTE — PROGRESS NOTES
General Surgery Breast Cancer History and Physical Exam     Summary:    Jody Simmons is a 76 y.o. lady. The patient presents with a new diagnosis of bilateral multifocal DCIS: Grade 2.     A multidisciplinary plan has been formulated for the patient:    (1) Breast Surgical Oncology:  -Genetic testing, follow up and BT results.    -MRI planned at the end of the month.   -Nurse navigator consult.   -Surgical plan: We will discuss surgical plan.     (2) Medical Oncology:  -She sees Dr. Travis tomorrow.     (3) Radiation Oncology:  -Will refer postoperatively for evaluation for radiation therapy, as indicated.    Referring Provider: No ref. provider found    Chief Complaint: abnormal breast imaging    History of Present Illness: Ms. Jody Simmons is a 76 y.o. year old lady, seen at the request of No ref. provider found for a new diagnosis of bilateral breast cancer.      This was initially detected as an imaging abnormality. She has annual mammograms each year. The patient states she postponed her yearly mammogram due to family health issues in 2017, and the pandemic in 2019/2020. She finally received her mammogram in 2021 and was told she had dense breast tissues. She was told to have a follow-up mammogram in 6 months but there was scheduling issues and she had received her COVID booster. The follow-up was finally completed in 02/2022. She denies any prior history of breast biopsies. Her work-up is detailed in the oncologic history below.     She denies any breast lumps, pain, skin changes, or nipple discharge. She denies any family history of breast or ovarian cancer.     Workup of Current Diagnosis:    06/17/2021 Bilateral Screening Mammogram:  IMPRESSION/RECOMMENDATIONS:  Indeterminate right breast calcifications. Recommend further evaluation with CC and lateral spot magnification and lateral 2-D and Tomosynthesis views.  Left breast architectural distortion. Recommend further evaluation with CC and MLO spot  compression and lateral views with Tomosynthesis and targeted ultrasound.    BI-RADS Category 0: Incomplete - Need Additional Imaging Evaluation and/or Prior Mammograms for Comparison.     01/05/2022 Bilateral Diagnostic Mammogram with Ultrasound:   IMPRESSION:  1. There is a cluster of microcalcifications in the posterior one third upper-outer quadrant of the right breast that measures on the order of 2.1 cm in greatest dimension. Correlation with a stereotactic-guided right breast biopsy is recommended.  2. There is an area of suspected subtle architectural distortion in the left breast at the 12-o'clock position in the middle third. Correlation with a Tomosynthesis guided left breast biopsy is recommended.  3. There are faint linear microcalcifications in the middle third lateral aspect of the left breast. Correlation with a stereotactic-guided left breast biopsy is recommended.  BI-RADS Category 4: Suspicious abnormality. Biopsy should be considered.    02/18/2022 Right Stereotactic Biopsy x1 and Left Stereotactic Biopsy x2:    Site 1: The linear calcifications in the outer left breast was/were localized and targeted under stereotactic/tomosynthesis guidance via a(n) lateral approach. The skin of the breast was cleansed and prepped. 1 ml of 1% lidocaine and 10 ml of 1% lidocaine with epinephrine was used  for local anesthesia. An 8 gauge needle used with a vacuum assisted biopsy device was advanced into the breast and 8 core specimens were obtained. Specimen radiography demonstrated a few of the targeted calcifications. A bowtie clip was then deployed at the biopsy site.   Site 2: The architectural distortion at 12:00 in the left breast was/were localized and targeted under stereotactic/tomosynthesis guidance via a(n) lateral approach. The skin of the breast was cleansed  and prepped. 1 ml of 1% lidocaine and 10 ml of 1% lidocaine with epinephrine was used for local anesthesia. An 8 gauge needle used with a  vacuum assisted biopsy device was advanced into the breast and 8 core specimens were obtained. Specimen radiography demonstrated mixed density  with areas of striation suggestive of the targeted distortion, as well as additional linear calcifications and the bowtie biopsy clip from site 1. A barbell clip was then deployed at site 2 and an additional bowtie clip was placed at site 1.   Site 3: The calcifications in the inner right breast was/were localized and targeted under stereotactic/tomosynthesis guidance via a(n) medial approach. The skin of the breast was cleansed and prepped. 1 ml of 1% lidocaine and 7 ml of 1% lidocaine with epinephrine was used for local  anesthesia. An 8 gauge needle used with a vacuum assisted biopsy device was advanced into the breast and 6 core specimens were obtained. The patient pulled out of compression during the procedure. Subsequently, specimen radiography demonstrated no calcifications. The patient was  repositioned and reimaged. The calcifications were again targeted. An additional 1 ml of 1% lidocaine and 5 ml of 1% lidocaine with epinephrine was used for local anesthesia. An 8 gauge needle used with a vacuum assisted biopsy device was advanced into the breast and 6 core  specimens were obtained. Specimen radiography demonstrated the targeted calcifications. A bowtie clip was then deployed at the biopsy site.     The patient tolerated the procedures well with no immediate  complications.     Post-procedural digital mammographic views of the left breast demonstrate the bowtie clip at the expected location at the site of biopsy in the anterior left breast. Residual calcifications are  identified in the outer left breast approximately 2.7 cm posterolateral to the biopsy clip. The barbell clip is displaced approximately 4.2 cm lateral and 1.2 cm inferior to the center of the residual distortion. Postprocedural digital mammographic views of the right breast demonstrate the bowtie  clip displaced approximately 3.4 cm inferior to residual calcifications, which measure up to approximately 3 cm.    2022 Pathology:   Left breast, site A, 1:00:  DCIS, 4 mm in size, intermediate grade, associated with an intraductal papilloma.  Right breast, 1:00:  DCIS, intermediate grade, up to 14 mm.   Left breast, 12:00:  DCIS, intermediate grade, up to 8 mm.   Site A is ER-/MI-, Ki-67 2%. Site B is ER+/MI+, Ki-67 4%. Site C is ER+/MI+, Ki-67 4%.    Gynecologic History:   . P:0 AB:0  Age at first childbirth: Not applicable.  Lactation/How long: Not applicable.  Age at menarche: 13  Age at menopause: 46    Past Medical History:   • Uterine fibroid  • Endometrial polyp    Past Surgical History:    • Hysterectomy due to a growing fibroid discovered by Dr. Corey.   • Excision of suspicious mole in .    Family History:    • As above.    Social History:  • Denies tobacco use  • Occasional alcohol use    Allergies:   No Known Allergies    Medications:     Current Outpatient Medications:   •  acetaminophen (TYLENOL) 325 MG tablet, Take 650 mg by mouth Every 6 (Six) Hours As Needed for Mild Pain ., Disp: , Rfl:   •  Ascorbic Acid (VITAMIN C PO), Take  by mouth Daily., Disp: , Rfl:   •  aspirin 81 MG tablet, Take  by mouth., Disp: , Rfl:   •  atorvastatin (LIPITOR) 10 MG tablet, Take 1 tablet by mouth Daily., Disp: 90 tablet, Rfl: 3  •  Calcium Carbonate-Vitamin D (CALTRATE 600+D PO), Take  by mouth., Disp: , Rfl:   •  Cholecalciferol (VITAMIN D) 1000 UNITS tablet, Take  by mouth., Disp: , Rfl:   •  Diclofenac Sodium (VOLTAREN) 1 % gel gel, Apply 4 g topically to the appropriate area as directed 4 (Four) Times a Day As Needed (pain)., Disp: 150 g, Rfl: 0  •  losartan (COZAAR) 100 MG tablet, Take 1 tablet by mouth Daily., Disp: 90 tablet, Rfl: 3  •  Multiple Vitamin (MULTI VITAMIN DAILY PO), Take  by mouth., Disp: , Rfl:   •  Multiple Vitamins-Minerals (ZINC PO), Take  by mouth Daily., Disp: , Rfl:      Laboratory Values:    Labs from 2021 reviewed.    Review of Systems:   Influenza-like illness: no fever, no  cough, no  sore throat, no  body aches, no loss of sense of taste or smell, no known exposure to person with Covid-19.  Constitutional: Negative for fevers or chills  HENT: Negative for hearing loss or runny nose  Eyes: Negative for vision changes or scleral icterus  Respiratory: Negative for cough or shortness of breath  Cardiovascular: Negative for chest pain or heart palpitations  Gastrointestinal: Negative for abdominal pain, nausea, vomiting, constipation, melena, or hematochezia  Genitourinary: Negative for hematuria or dysuria  Musculoskeletal: Negative for joint swelling or gait instability  Neurologic: Negative for tremors or seizures  Psychiatric: Negative for suicidal ideations or depression  All other systems reviewed and negative    Physical Exam:   • ECO - Asymptomatic  • Constitutional: Well-developed well-nourished, no acute distress  • Eyes: Conjunctiva normal, sclera nonicteric  • ENMT: Hearing grossly normal, oral mucosa moist  • Neck: Supple, no palpable mass, trachea midline  • Respiratory: Clear to auscultation, normal inspiratory effort  • Cardiovascular: Regular rate, no peripheral edema, no jugular venous distention  • Breast: symmetric.   o Right: Retroareolar hematoma with no palpable masses.  o Left: Retroareolar hematoma with no palpable masses.  o Biopsy site appreciated in bilateral breasts, otherwise no skin changes.   o No clinical chest wall involvement.  • Gastrointestinal: Soft, nontender  • Lymphatics (palpable nodes): No cervical, supraclavicular or axillary lymphadenopathy  • Skin:  Warm, dry, no rash on visualized skin surfaces  • Musculoskeletal: Symmetric strength, normal gait  • Psychiatric: Alert and oriented ×3, normal affect     Discussion:  I had an extensive discussion with the patient and her family about the nature of her breast cancer diagnosis. We  reviewed the components of breast tissue including ducts and lobules. We reviewed her pathology report in detail. We reviewed breast cancer histology, including stage, grade, ER/UT receptors, HER2 receptors and how this applies to her diagnosis. We reviewed the basics of systemic and local/regional management of breast cancer.     We discussed that most breast cancer is not hereditary, however given her family history, this may play a role in her case. I believe genetic testing is warranted and could affect surgical decision making.      We reviewed potential surgical treatments to include partial mastectomy, mastectomy, sentinel lymph node biopsy and axillary node dissection and discussed the rationale associated with each approach. Regarding radiation therapy, we discussed that radiation is indicated in all cases of breast conservation and in only limited circumstances following mastectomy. We discussed that the primary goal of adjuvant radiation is to decrease the likelihood of local recurrence.     I described additional risks and potential complications associated with surgery, including, but not limited to, bleeding, infection, deformity/poor cosmetic result, chronic pain, numbness, seroma, hematoma, deep venous thrombosis, skin flap necrosis, disease recurrence and the possibility of requiring additional surgery. We also discussed other treatment options including the option of not undergoing any surgical treatment and the risks associated with this including disease progression. She expressed an understanding of these factors and wished to proceed.    We discussed that in her case, systemic treatment would involve endocrine therapy and possibly chemotherapy. She will be referred to medical oncology postoperatively to discuss this further.     SHARIF NUNEZ M.D.  General and Endoscopic Surgery  [facility]    [address], [address]  [place], [place], [address]  P: [phone]  F: [phone]      Transcribed from ambient dictation for Delmis Bundy MD by Romelia Barnes/Brenna Grey.  03/08/22   12:01 EST    Patient verbalized consent to the visit recording.

## 2022-03-07 NOTE — PROGRESS NOTES
Referral received from Dr. Bundy's office. Met Iris during her surgery consult. I introduced myself and navigational services. She has a good understanding of her pathology and treatment options presented to her by Dr. Bundy. After the consult she is leaning toward having a lumpectomy. She is comfortable with this plan and has no questions or concerns following the consult.     We discussed her support system and she stated she has friends and neighbors who can help out as needed and family she can talk to on the phone. She has no other resource needs at this time.     We discussed integrative therapies and other services at [facility]. I gave her a navigation folder with the following information: [facility] Cancer Support Network, Sh[facility] Our Stories Breast Cancer Support Group, Cancer and Restorative Exercise (CARE), [facility], Together for Breast Cancer Survival, Guide for the Newly Diagnosed, [employer], [facility], Massage Therapy, Reiki Therapy, [employer], Cancer Nutrition, and Survivorship Clinic.    She verbalized appreciation for navigational services and she has my contact information and will call with any questions that arise.

## 2022-03-08 ENCOUNTER — CONSULT (OUTPATIENT)
Dept: ONCOLOGY | Facility: CLINIC | Age: 77
End: 2022-03-08

## 2022-03-08 ENCOUNTER — APPOINTMENT (OUTPATIENT)
Dept: LAB | Facility: HOSPITAL | Age: 77
End: 2022-03-08

## 2022-03-08 VITALS
SYSTOLIC BLOOD PRESSURE: 156 MMHG | HEART RATE: 86 BPM | RESPIRATION RATE: 16 BRPM | OXYGEN SATURATION: 98 % | DIASTOLIC BLOOD PRESSURE: 83 MMHG | WEIGHT: 159.2 LBS | BODY MASS INDEX: 33.42 KG/M2 | HEIGHT: 58 IN | TEMPERATURE: 97.1 F

## 2022-03-08 DIAGNOSIS — D05.11 DUCTAL CARCINOMA IN SITU (DCIS) OF RIGHT BREAST: Primary | ICD-10-CM

## 2022-03-08 DIAGNOSIS — D05.12 DUCTAL CARCINOMA IN SITU (DCIS) OF LEFT BREAST: ICD-10-CM

## 2022-03-08 PROCEDURE — 99205 OFFICE O/P NEW HI 60 MIN: CPT | Performed by: INTERNAL MEDICINE

## 2022-03-08 NOTE — PROGRESS NOTES
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    HISTORY OF PRESENT ILLNESS:  The patient is a 76 y.o. year old female who is here for an opinion about the above issue.    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 The diagnostic mammogram was not done until January because of COVID-19 and this Confirmed abnormal microcalcifications in the upper outer quadrant of the right breast measuring 2.1 cm and in the left breast at 12:00 and also a second area nearby in the left breast was felt to be abnormal. Patient had missed mammograms between  and .    Patient underwent biopsy of a lesion on the right breast and 2 lesions in the left breast at 1-2 o'clock and all 3 of them showed DCIS The 1:00 lesion on the left was intermediate grade DCIS ER/NV negative the 12:00 lesion in the left breast showed intermediate grade DCIS weakly ER NV (10%/5%) positive in the right breast showed intermediate grade DCIS strongly ER/NV positive    Patient saw Dr. Bundy and scheduled for breast MRI on  and is awaiting this before making a decision on surgery    She is  0 para 0 menarche was age 13 menopause at 46. 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    Past Medical History:   Diagnosis Date   • Bilateral malignant neoplasm of overlapping sites of breast in female (HCC) 02/28/2022   • Dyslipidemia    • Endometrial polyp 04/21/2015    benign    • Fibroid    • Frozen shoulder 1980s   • H/O pelvic mass    • Hyperlipidemia    • Osteopenia    • PMB (postmenopausal bleeding)    • Screen for colon cancer 2021    Cologuard: negative result   • UTI (urinary tract infection)         Past Surgical History:   Procedure Laterality Date   • BREAST BIOPSY Bilateral 02/18/2022    STEROTACTIC BREAST BIOPSY; Left Breast Site #1 Outer Middle bowtie biopsy clip; Left Breast Site #2 12:00 position barbell clip deployed; Right Breast Upper Inner Posterior Bowtie clip deployed;   • COLONOSCOPY N/A 2009   • HYSTERECTOMY  2015   • OOPHORECTOMY     • SKIN LESION EXCISION      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     • Ascorbic Acid (VITAMIN C PO) Take  by mouth Daily.     • atorvastatin (LIPITOR) 10 MG tablet Take 1 tablet by mouth Daily. 90 tablet 3   • Calcium Carbonate-Vitamin D (CALTRATE 600+D PO) Take  by mouth.     • Cholecalciferol (VITAMIN D) 1000 UNITS tablet Take 1,000 Units by mouth Daily.     • Diclofenac Sodium (VOLTAREN) 1 % gel gel Apply 4 g topically to the appropriate area as directed 4 (Four) Times a Day As Needed (pain). 150 g 0   • losartan (COZAAR) 100 MG tablet Take 1 tablet by mouth Daily. 90 tablet 3   • Multiple Vitamin (MULTI VITAMIN DAILY PO) Take 1 tablet by mouth Daily.     • Multiple Vitamins-Minerals (ZINC PO) Take 1 tablet by mouth Daily.     • [DISCONTINUED]  "aspirin 81 MG tablet Take 81 mg by mouth Daily. Has not taken since before bx       No current facility-administered medications on file prior to visit.        ALLERGIES:  No Known Allergies     Social History     Socioeconomic History   • Marital status: Single   Tobacco Use   • Smoking status: Never Smoker   • Smokeless tobacco: Never Used   Vaping Use   • Vaping Use: Never used   Substance and Sexual Activity   • Alcohol use: Yes     Alcohol/week: 1.0 standard drink     Types: 1 Glasses of wine per week     Comment: SOCIAL rare   • Drug use: No   • Sexual activity: Not Currently     Partners: Male     Birth control/protection: Post-menopausal        Family History   Problem Relation Age of Onset   • Diabetes Mother    • Heart disease Father    • Diabetes Brother    • Cancer Brother         prostate        Review of Systems   All other systems reviewed and are negative.       Objective     Vitals:    03/08/22 1558   BP: 156/83   Pulse: 86   Resp: 16   Temp: 97.1 °F (36.2 °C)   TempSrc: Temporal   SpO2: 98%   Weight: 72.2 kg (159 lb 3.2 oz)   Height: 147.8 cm (58.19\")  Comment: New Ht No Shoes   PainSc: 0-No pain     Current Status 3/8/2022   ECOG score 0       Physical Exam  Chest:           CONSTITUTIONAL:  Vital signs reviewed.  No distress, looks comfortable.  EYES:  Conjunctivae and lids unremarkable.  PERRLA  EARS,NOSE,MOUTH,THROAT:  Ears and nose appear unremarkable.  Lips, teeth, gums appear unremarkable.  RESPIRATORY:  Normal respiratory effort.  Lungs clear to auscultation bilaterally.  BREASTS: There is extensive bruising in both breasts from her biopsy and there is a hematoma at the 3 o'clock position of the right breast and the 2 o'clock position of the left breast  CARDIOVASCULAR:  Normal S1, S2.  No murmurs rubs or gallops.  No significant lower extremity edema.  GASTROINTESTINAL: Abdomen appears unremarkable.  Nontender.  No hepatomegaly.  No splenomegaly.  LYMPHATIC:  No cervical, supraclavicular, " axillary lymphadenopathy.  SKIN:  Warm.  No rashes.  PSYCHIATRIC:  Normal judgment and insight.  Normal mood and affect.      RECENT LABS:  Hematology WBC   Date Value Ref Range Status   11/02/2021 5.7 3.4 - 10.8 x10E3/uL Final     RBC   Date Value Ref Range Status   11/02/2021 4.51 3.77 - 5.28 x10E6/uL Final     Hemoglobin   Date Value Ref Range Status   11/02/2021 15.0 11.1 - 15.9 g/dL Final     Hematocrit   Date Value Ref Range Status   11/02/2021 44.2 34.0 - 46.6 % Final     Platelets   Date Value Ref Range Status   11/02/2021 206 150 - 450 x10E3/uL Final      Final Diagnosis  1. Breast, Left, Site A, 1 o'clock, Biopsy: Ductal carcinoma in situ  A. The ductal carcinoma in situ measures up to 4 mm on the slide.  B. The ductal carcinoma in situ is of intermediate nuclear grade with cribriform architecture and individual cell  necrosis.  C. Microcalcifications are associated with the ductal carcinoma in situ and non-neoplastic tissue.  D. No invasion is identified.  E. Intraductal papilloma.  2. Breast, Right, 1 o'clock, Biopsy: Ductal carcinoma in situ with  A. The ductal carcinoma in situ is of intermediate nuclear grade with solid, cribriform and comedo architecture  and expansive necrosis.  B. Microcalcifications are associated with the ductal carcinoma in situ and non-neoplastic tissue.  C. The ductal carcinoma in situ measures up to 14 mm in one area but is present on several cores.  3. Breast. Left, 12 o'clock, Biopsy: Ductal carcinoma in situ with  A. The ductal carcinoma in situ is of intermediate nuclear grade with cribriform architecture and no necrosis.  B. The ductal carcinoma in situ measures up to 8 mm on a slide.  C. Microcalcifications are associated with the ductal carcinoma in situ and non-neoplastic tissue.  D. Intraductal papilloma.  Comment: Part 3=The cancer in this sample has a low level (1-10%) of ER expression by IHC. There are limited data on  the overall benefit of endocrine therapies  for patients with low level (1-10%) ER expression but they currently suggest  possible benefit, so patients are considered eligible for endocrine treatment. There are data that suggest invasive  cancers with these results are heterogeneous in both behavior and biology and often have gene expression profiles more  similar to ER-negative cancers.    Breast Biomarker Reporting Template (BREAST: BIOMARKER REPORTING TEMPLATE - All Specimens) Protocol posted: 11/10/2021  Test(s) Performed  Estrogen Receptor (ER) Status Negative (less than 1%)  Internal control cells present and stain as expected  Test Type Food and Drug Administration (FDA) cleared (test / vendor): Ventena  Primary Antibody SP1  Scoring System Yadiel  Proportion Score 1  Intensity Score 2  Total Yadiel Score 3  Test(s) Performed  Progesterone Receptor (PgR) Status Negative (less than 1%)  Internal control cells present and stain as expected  Test Type Food and Drug Administration (FDA) cleared (test / vendor): Ventena  Primary Antibody 1E2  Scoring System Yadiel  Proportion Score 0  Intensity Score 0  Total Yadiel Score 0  Test(s) Performed Ki-67  Percentage of Cells with Nuclear Positivity 2 %  Primary Antibody 30-9  Cold Ischemia and Fixation Times Meet requirements specified in latest version of the ASCO / CAP  Guidelines  Cold Ischemia Time (minutes) 11 min  Fixation Time (hours) 7.5 hours  Testing Performed on Block Number(s) 1A  .  METHODS  Fixative Formalin  Image Analysis Not performed  .  DCIS OF THE BREAST: Biopsy (DCIS OF THE BREAST: BIOPSY - All Specimens) Protocol posted: 6/30/2021  SPECIMEN  Procedure Needle biopsy  Specimen Laterality Right  .  TUMOR  Tumor Site Upper inner quadrant  Clock position  1 o'clock  Histologic Type Ductal carcinoma in situ (DCIS)  Architectural Patterns Comedo  Page: 2 of 7 Printed: 2/21/2022 10:58 [facility], [phone]  [address] [place] [address]  "[id number]  PATIENT: Jody Simmons  MRN: [id number] [facility]: [id number]  Cribriform  Nuclear Grade Grade II (intermediate)  Necrosis Present, central (expansive \"comedo\" necrosis)  Microcalcifications Present in DCIS  Present in non-neoplastic tissue  .  Breast Biomarker Reporting Template (BREAST: BIOMARKER REPORTING TEMPLATE - All Specimens) Protocol posted: 11/10/2021  Test(s) Performed  Estrogen Receptor (ER) Status Positive (greater than 10% of cells demonstrate nuclear positivity)  Percentage of Cells with Nuclear Positivity 90 %  Average Intensity of Staining Strong  Test Type Food and Drug Administration (FDA) cleared (test / vendor): Ventena  Primary Antibody SP1  Scoring System Yadiel  Proportion Score 5  Intensity Score 3  Total Yadiel Score 8  Test(s) Performed  Progesterone Receptor (PgR) Status Positive  Percentage of Cells with Nuclear Positivity 30 %  Average Intensity of Staining Moderate  Test Type Food and Drug Administration (FDA) cleared (test / vendor): Ventena  Primary Antibody 1E2  Scoring System Yadiel  Proportion Score 3  Intensity Score 2  Total Yadiel Score 5  Test(s) Performed Ki-67  Percentage of Cells with Nuclear Positivity 4 %  Primary Antibody 30-9  Cold Ischemia and Fixation Times Meet requirements specified in latest version of the ASCO / CAP  Guidelines  Cold Ischemia Time (minutes) 19 min  Fixation Time (hours) 7 hours  Testing Performed on Block Number(s) 2A  .  METHODS  Fixative Formalin  Image Analysis Not performed  .  DCIS OF THE BREAST: Biopsy (DCIS OF THE BREAST: BIOPSY - All Specimens) Protocol posted: 6/30/2021  SPECIMEN  Procedure Needle biopsy  Specimen Laterality Left  .  TUMOR  Tumor Site Clock position  12 o'clock  Histologic Type Ductal carcinoma in situ (DCIS)  Architectural Patterns Cribriform  Nuclear Grade Grade II (intermediate)  Necrosis Not identified  Microcalcifications Present in DCIS  Present in non-neoplastic tissue  .  Breast Biomarker Reporting Template " "(BREAST: BIOMARKER REPORTING TEMPLATE - All Specimens) Protocol posted: 11/10/2021  Page: 3 of 7 Printed: 2/21/2022 10:58 [facility], [phone]  [address] [place] [address]  PATIENT: Jody Simmons  MRN: [id number] CSN: [id number]  .  Gross Description  1. Received in formalin labeled with the patient's name and designated \"left breast site A stereotactic biopsy at 1 o'clock,  for calcifications\" is a CoreTainer device with two six-chamber well device within it labeled #1 - #6 and #7 - #12. The  cores are fatty yellow to gray-white ranging in size from 1.6 cm up to 2.7 x 0.6 cm in maximal tubal diameter. The cores  are present in chamber wells 1 - 5 and 7 - 9. The radiographic image via Epic reveals calcifications present in chamber  well #2 and faint possible calcifications in chamber well #3. Chamber well #7 reveals hyperdense appearing tissue. The  entire specimen is inked in blue and submitted as follows:  1A: chamber wells 2, 3, and 7 to include all areas of possible calcification  1B: chamber wells 1, 4, and 5  1C: chamber wells 8 and 9  The specimen was collected at 13:06 and placed in formalin at 13:17 for an ischemic time of 11 minutes and a total  fixation time of approximately 7.5 hours.  2. Received in formalin labeled with the patient's name and designated \"right breast stereotactic biopsy at 1 o'clock, for  calcifications\" are two CoreTainer devices that will be arbitrarily labeled CoreTainer device A and CoreTainer device B.  Coretainer device A consists of two six chamber well devices labeled #1 - #6 and 1 -6 . The specimen consists of multiple  yellow to gray-white rubbery core fragments of fibrofatty soft tissue ranging in size from 1.6 cm up to 1.8 x 0.6 cm in tubal  diameter. The cores are present in all six chamber wells on both chamber well devices. The radiographic image via Epic  reveals calcifications present in chamber " well #2 unfortunately I cannot determine which of the chamber well devices the  Test(s) Performed  Estrogen Receptor (ER) Status Positive (greater than 10% of cells demonstrate nuclear positivity)  Percentage of Cells with Nuclear Positivity 10 %  Average Intensity of Staining Moderate  Test Type Food and Drug Administration (FDA) cleared (test / vendor): Ventena  Primary Antibody SP1  Scoring System Yadiel  Proportion Score 2  Intensity Score 2  Total Yadiel Score 4  Test(s) Performed  Progesterone Receptor (PgR) Status Positive  Percentage of Cells with Nuclear Positivity 5 %  Average Intensity of Staining Strong  Test Type Food and Drug Administration (FDA) cleared (test / vendor): Ventena  Primary Antibody 1E2  Scoring System Yadiel  Proportion Score 2  Intensity Score 3  Total Yadiel Score 5  Test(s) Performed Ki-67  Percentage of Cells with Nuclear Positivity 4 %  Primary Antibody 30-9  Cold Ischemia and Fixation Times Meet requirements specified in latest version of the ASCO / CAP  Guidelines  Cold Ischemia Time (minutes) 5 min  Fixation Time (hours) 7.5 hours  Testing Performed on Block Number(s) 3B  .  METHODS  Fixative Formalin  Image Analysis Not performed  .      Assessment/Plan   1. Intermediate grade DCIS both breasts-essentially ER/TN negative on the left and strongly hormone positive on the right post biopsy    2. Negative family history for breast or ovarian cancer 9 gene Invitae panel pending    3. Osteopenia    4. Significant ecchymosis from biopsies told her to hold her aspirin now until surgery    Plan  Discussed at length with Jody the options she has for her bilateral DCIS. I suggested she wait until the MRI to make sure she does not have multifocal disease in both breasts in which case one of her options could be bilateral simple mastectomies-the other option would be bilateral lumpectomies with hormonal blockade although the ER/TN negative DCIS would not benefit from this.  In addition of  radiation to the left breast because of the ER/DE negative really is another option and finally lumpectomy with no hormonal blockade or radiation is also acceptable option    She is going to review all the options when she has her MRI and we will plan to see her back 2 to 3 weeks after surgery to decide if any further prevention or treatment is indicated    I spent 60 total minutes, face-to-face, caring for Jody today.  60% of this time involved counseling and/or coordination of care as documented within this note.

## 2022-03-09 LAB
ALBUMIN SERPL-MCNC: 4.3 G/DL (ref 3.7–4.7)
ALBUMIN/GLOB SERPL: 2 {RATIO} (ref 1.2–2.2)
ALP SERPL-CCNC: 121 IU/L (ref 44–121)
ALT SERPL-CCNC: 25 IU/L (ref 0–32)
APPEARANCE UR: CLEAR
AST SERPL-CCNC: 18 IU/L (ref 0–40)
BACTERIA #/AREA URNS HPF: NORMAL /[HPF]
BASOPHILS # BLD AUTO: 0 X10E3/UL (ref 0–0.2)
BASOPHILS NFR BLD AUTO: 0 %
BILIRUB SERPL-MCNC: 0.3 MG/DL (ref 0–1.2)
BILIRUB UR QL STRIP: NEGATIVE
BUN SERPL-MCNC: 14 MG/DL (ref 8–27)
BUN/CREAT SERPL: 16 (ref 12–28)
CALCIUM SERPL-MCNC: 9 MG/DL (ref 8.7–10.3)
CASTS URNS QL MICRO: NORMAL /LPF
CHLORIDE SERPL-SCNC: 102 MMOL/L (ref 96–106)
CHOLEST SERPL-MCNC: 166 MG/DL (ref 100–199)
CO2 SERPL-SCNC: 23 MMOL/L (ref 20–29)
COLOR UR: YELLOW
CREAT SERPL-MCNC: 0.85 MG/DL (ref 0.57–1)
EGFR GENE MUT ANL BLD/T: 71 ML/MIN/1.73
EOSINOPHIL # BLD AUTO: 0.1 X10E3/UL (ref 0–0.4)
EOSINOPHIL NFR BLD AUTO: 1 %
EPI CELLS #/AREA URNS HPF: NORMAL /HPF (ref 0–10)
ERYTHROCYTE [DISTWIDTH] IN BLOOD BY AUTOMATED COUNT: 12.9 % (ref 11.7–15.4)
GLOBULIN SER CALC-MCNC: 2.2 G/DL (ref 1.5–4.5)
GLUCOSE SERPL-MCNC: 103 MG/DL (ref 65–99)
GLUCOSE UR QL STRIP: NEGATIVE
HBA1C MFR BLD: 6.1 % (ref 4.8–5.6)
HCT VFR BLD AUTO: 43.4 % (ref 34–46.6)
HDLC SERPL-MCNC: 50 MG/DL
HGB BLD-MCNC: 14.8 G/DL (ref 11.1–15.9)
HGB UR QL STRIP: NEGATIVE
IMM GRANULOCYTES # BLD AUTO: 0 X10E3/UL (ref 0–0.1)
IMM GRANULOCYTES NFR BLD AUTO: 0 %
KETONES UR QL STRIP: NEGATIVE
LDLC SERPL CALC-MCNC: 98 MG/DL (ref 0–99)
LDLC/HDLC SERPL: 2 RATIO (ref 0–3.2)
LEUKOCYTE ESTERASE UR QL STRIP: NEGATIVE
LYMPHOCYTES # BLD AUTO: 1.5 X10E3/UL (ref 0.7–3.1)
LYMPHOCYTES NFR BLD AUTO: 22 %
MCH RBC QN AUTO: 32.7 PG (ref 26.6–33)
MCHC RBC AUTO-ENTMCNC: 34.1 G/DL (ref 31.5–35.7)
MCV RBC AUTO: 96 FL (ref 79–97)
MICRO URNS: NORMAL
MICRO URNS: NORMAL
MONOCYTES # BLD AUTO: 0.6 X10E3/UL (ref 0.1–0.9)
MONOCYTES NFR BLD AUTO: 9 %
NEUTROPHILS # BLD AUTO: 4.7 X10E3/UL (ref 1.4–7)
NEUTROPHILS NFR BLD AUTO: 68 %
NITRITE UR QL STRIP: NEGATIVE
PH UR STRIP: 7 [PH] (ref 5–7.5)
PLATELET # BLD AUTO: 240 X10E3/UL (ref 150–450)
POTASSIUM SERPL-SCNC: 4.6 MMOL/L (ref 3.5–5.2)
PROT SERPL-MCNC: 6.5 G/DL (ref 6–8.5)
PROT UR QL STRIP: NEGATIVE
RBC # BLD AUTO: 4.52 X10E6/UL (ref 3.77–5.28)
RBC #/AREA URNS HPF: NORMAL /HPF (ref 0–2)
SODIUM SERPL-SCNC: 140 MMOL/L (ref 134–144)
SP GR UR STRIP: 1.01 (ref 1–1.03)
TRIGL SERPL-MCNC: 96 MG/DL (ref 0–149)
UROBILINOGEN UR STRIP-MCNC: 0.2 MG/DL (ref 0.2–1)
VLDLC SERPL CALC-MCNC: 18 MG/DL (ref 5–40)
WBC # BLD AUTO: 7 X10E3/UL (ref 3.4–10.8)
WBC #/AREA URNS HPF: NORMAL /HPF (ref 0–5)

## 2022-03-11 ENCOUNTER — OFFICE VISIT (OUTPATIENT)
Dept: FAMILY MEDICINE CLINIC | Facility: CLINIC | Age: 77
End: 2022-03-11

## 2022-03-11 VITALS
WEIGHT: 159 LBS | HEIGHT: 58 IN | BODY MASS INDEX: 33.37 KG/M2 | SYSTOLIC BLOOD PRESSURE: 128 MMHG | DIASTOLIC BLOOD PRESSURE: 76 MMHG | HEART RATE: 80 BPM | TEMPERATURE: 96.8 F | OXYGEN SATURATION: 96 % | RESPIRATION RATE: 18 BRPM

## 2022-03-11 DIAGNOSIS — D05.12 DUCTAL CARCINOMA IN SITU (DCIS) OF LEFT BREAST: ICD-10-CM

## 2022-03-11 DIAGNOSIS — E78.2 MIXED HYPERLIPIDEMIA: ICD-10-CM

## 2022-03-11 DIAGNOSIS — I10 ESSENTIAL HYPERTENSION: ICD-10-CM

## 2022-03-11 DIAGNOSIS — C50.911 BILATERAL MALIGNANT NEOPLASM OF BREAST IN FEMALE, UNSPECIFIED ESTROGEN RECEPTOR STATUS, UNSPECIFIED SITE OF BREAST: Primary | ICD-10-CM

## 2022-03-11 DIAGNOSIS — D05.11 DUCTAL CARCINOMA IN SITU (DCIS) OF RIGHT BREAST: ICD-10-CM

## 2022-03-11 DIAGNOSIS — R73.03 PREDIABETES: ICD-10-CM

## 2022-03-11 DIAGNOSIS — C50.912 BILATERAL MALIGNANT NEOPLASM OF BREAST IN FEMALE, UNSPECIFIED ESTROGEN RECEPTOR STATUS, UNSPECIFIED SITE OF BREAST: Primary | ICD-10-CM

## 2022-03-11 PROBLEM — M17.11 PRIMARY OSTEOARTHRITIS OF RIGHT KNEE: Status: ACTIVE | Noted: 2022-03-11

## 2022-03-11 PROCEDURE — 99214 OFFICE O/P EST MOD 30 MIN: CPT | Performed by: INTERNAL MEDICINE

## 2022-03-11 NOTE — PROGRESS NOTES
Subjective   Jody Simmons is a 76 y.o. female. Patient is here today for   Chief Complaint   Patient presents with   • Hypertension      LAB FOLLOW UP           Vitals:    03/11/22 1015   BP: 128/76   Pulse: 80   Resp: 18   Temp: 96.8 °F (36 °C)   SpO2: 96%     Body mass index is 33.02 kg/m².      Past Medical History:   Diagnosis Date   • Bilateral malignant neoplasm of overlapping sites of breast in female (HCC) 02/28/2022   • Dyslipidemia    • Endometrial polyp 04/21/2015    benign    • Fibroid    • Frozen shoulder 1980s   • H/O pelvic mass    • Hyperlipidemia    • Osteopenia    • PMB (postmenopausal bleeding)    • Primary osteoarthritis of right knee 3/11/2022   • Screen for colon cancer 2021    Cologuard: negative result   • UTI (urinary tract infection)       No Known Allergies   Social History     Socioeconomic History   • Marital status: Single   Tobacco Use   • Smoking status: Never Smoker   • Smokeless tobacco: Never Used   Vaping Use   • Vaping Use: Never used   Substance and Sexual Activity   • Alcohol use: Yes     Alcohol/week: 1.0 standard drink     Types: 1 Glasses of wine per week     Comment: SOCIAL rare   • Drug use: No   • Sexual activity: Not Currently     Partners: Male     Birth control/protection: Post-menopausal        Current Outpatient Medications:   •  acetaminophen (TYLENOL) 325 MG tablet, Take 650 mg by mouth Every 6 (Six) Hours As Needed for Mild Pain ., Disp: , Rfl:   •  Ascorbic Acid (VITAMIN C PO), Take  by mouth Daily., Disp: , Rfl:   •  atorvastatin (LIPITOR) 10 MG tablet, Take 1 tablet by mouth Daily., Disp: 90 tablet, Rfl: 3  •  Calcium Carbonate-Vitamin D (CALTRATE 600+D PO), Take  by mouth., Disp: , Rfl:   •  Cholecalciferol (VITAMIN D) 1000 UNITS tablet, Take 1,000 Units by mouth Daily., Disp: , Rfl:   •  Diclofenac Sodium (VOLTAREN) 1 % gel gel, Apply 4 g topically to the appropriate area as directed 4 (Four) Times a Day As Needed (pain)., Disp: 150 g, Rfl: 0  •  losartan  (COZAAR) 100 MG tablet, Take 1 tablet by mouth Daily., Disp: 90 tablet, Rfl: 3  •  Multiple Vitamin (MULTI VITAMIN DAILY PO), Take 1 tablet by mouth Daily., Disp: , Rfl:   •  Multiple Vitamins-Minerals (ZINC PO), Take 1 tablet by mouth Daily., Disp: , Rfl:      Objective     Hypertension         Review of Systems   Constitutional: Negative.    HENT: Negative.    Respiratory: Negative.    Cardiovascular: Negative.    Musculoskeletal: Negative.    Psychiatric/Behavioral: Negative.        Physical Exam  Vitals and nursing note reviewed.   Constitutional:       Appearance: Normal appearance. She is obese.      Comments: Pleasant, neatly groomed, in no distress.   Neurological:      Mental Status: She is alert and oriented to person, place, and time.   Psychiatric:         Mood and Affect: Mood normal.         Behavior: Behavior normal.         Thought Content: Thought content normal.           Problems Addressed this Visit        Cardiac and Vasculature    Mixed hyperlipidemia    Essential hypertension       Endocrine and Metabolic    Prediabetes       Hematology and Neoplasia    Ductal carcinoma in situ (DCIS) of right breast    Ductal carcinoma in situ (DCIS) of left breast      Other Visit Diagnoses     Bilateral malignant neoplasm of breast in female, unspecified estrogen receptor status, unspecified site of breast (HCC)    -  Primary    Relevant Orders    Ambulatory Referral to General Surgery (Completed)    Ambulatory Referral to Oncology (Completed)      Diagnoses       Codes Comments    Bilateral malignant neoplasm of breast in female, unspecified estrogen receptor status, unspecified site of breast (HCC)    -  Primary ICD-10-CM: C50.911, C50.912  ICD-9-CM: 174.9     Essential hypertension     ICD-10-CM: I10  ICD-9-CM: 401.9     Mixed hyperlipidemia     ICD-10-CM: E78.2  ICD-9-CM: 272.2     Prediabetes     ICD-10-CM: R73.03  ICD-9-CM: 790.29     Ductal carcinoma in situ (DCIS) of right breast     ICD-10-CM:  D05.11  ICD-9-CM: 233.0     Ductal carcinoma in situ (DCIS) of left breast     ICD-10-CM: D05.12  ICD-9-CM: 233.0             PLAN  Regarding the pain in her right knee, he asked me if I could provide her with a form for handicap parking permit which I did.    His hypertension which is well controlled.    He and I reviewed her labs from last week.    She has a mixed hyperlipidemia with excellent control on atorvastatin 10 mg daily.    She is prediabetic.  Her hemoglobin A1c is 6.1%.  I will continue to monitor this.    Regarding her bilateral ductal carcinoma in situ, she would like to get a second opinion from [facility]'s surgical oncology and medical oncology.  She specified Dr. Jeffrey and Dr. Lancaster.  I have made those referrals.    I asked her to follow-up for an annual wellness visit approximately June.      No follow-ups on file.

## 2022-03-22 ENCOUNTER — APPOINTMENT (OUTPATIENT)
Dept: MRI IMAGING | Facility: HOSPITAL | Age: 77
End: 2022-03-22

## 2022-03-25 ENCOUNTER — NURSE NAVIGATOR (OUTPATIENT)
Dept: OTHER | Facility: HOSPITAL | Age: 77
End: 2022-03-25

## 2022-03-25 NOTE — PROGRESS NOTES
Called Ms. Simmons to see how she was doing and to see if she had made any decisions. Left a message with my contact information and asked her to call back at her convenience.        Ms. Simmons called me right back and stated she is moving her care elsewhere and has no more needs at this time.

## 2022-08-31 ENCOUNTER — OFFICE VISIT (OUTPATIENT)
Dept: FAMILY MEDICINE CLINIC | Facility: CLINIC | Age: 77
End: 2022-08-31

## 2022-08-31 VITALS
RESPIRATION RATE: 18 BRPM | BODY MASS INDEX: 34.63 KG/M2 | TEMPERATURE: 98.2 F | SYSTOLIC BLOOD PRESSURE: 126 MMHG | HEART RATE: 86 BPM | HEIGHT: 58 IN | WEIGHT: 165 LBS | DIASTOLIC BLOOD PRESSURE: 72 MMHG | OXYGEN SATURATION: 96 %

## 2022-08-31 DIAGNOSIS — R60.0 LEG EDEMA, RIGHT: Primary | ICD-10-CM

## 2022-08-31 PROCEDURE — 99214 OFFICE O/P EST MOD 30 MIN: CPT | Performed by: INTERNAL MEDICINE

## 2022-09-09 ENCOUNTER — HOSPITAL ENCOUNTER (OUTPATIENT)
Dept: CARDIOLOGY | Facility: HOSPITAL | Age: 77
Discharge: HOME | End: 2022-09-09
Admitting: INTERNAL MEDICINE

## 2022-09-09 DIAGNOSIS — R60.0 LEG EDEMA, RIGHT: ICD-10-CM

## 2022-09-09 LAB
BH CV LOWER VASCULAR LEFT COMMON FEMORAL AUGMENT: NORMAL
BH CV LOWER VASCULAR LEFT COMMON FEMORAL COMPETENT: NORMAL
BH CV LOWER VASCULAR LEFT COMMON FEMORAL COMPRESS: NORMAL
BH CV LOWER VASCULAR LEFT COMMON FEMORAL PHASIC: NORMAL
BH CV LOWER VASCULAR LEFT COMMON FEMORAL SPONT: NORMAL
BH CV LOWER VASCULAR RIGHT COMMON FEMORAL AUGMENT: NORMAL
BH CV LOWER VASCULAR RIGHT COMMON FEMORAL COMPETENT: NORMAL
BH CV LOWER VASCULAR RIGHT COMMON FEMORAL COMPRESS: NORMAL
BH CV LOWER VASCULAR RIGHT COMMON FEMORAL PHASIC: NORMAL
BH CV LOWER VASCULAR RIGHT COMMON FEMORAL SPONT: NORMAL
BH CV LOWER VASCULAR RIGHT DISTAL FEMORAL COMPRESS: NORMAL
BH CV LOWER VASCULAR RIGHT GASTRONEMIUS COMPRESS: NORMAL
BH CV LOWER VASCULAR RIGHT GREATER SAPH AK COMPRESS: NORMAL
BH CV LOWER VASCULAR RIGHT GREATER SAPH BK COMPRESS: NORMAL
BH CV LOWER VASCULAR RIGHT LESSER SAPH COMPRESS: NORMAL
BH CV LOWER VASCULAR RIGHT MID FEMORAL AUGMENT: NORMAL
BH CV LOWER VASCULAR RIGHT MID FEMORAL COMPETENT: NORMAL
BH CV LOWER VASCULAR RIGHT MID FEMORAL COMPRESS: NORMAL
BH CV LOWER VASCULAR RIGHT MID FEMORAL PHASIC: NORMAL
BH CV LOWER VASCULAR RIGHT MID FEMORAL SPONT: NORMAL
BH CV LOWER VASCULAR RIGHT PERONEAL COMPRESS: NORMAL
BH CV LOWER VASCULAR RIGHT POPLITEAL AUGMENT: NORMAL
BH CV LOWER VASCULAR RIGHT POPLITEAL COMPETENT: NORMAL
BH CV LOWER VASCULAR RIGHT POPLITEAL COMPRESS: NORMAL
BH CV LOWER VASCULAR RIGHT POPLITEAL PHASIC: NORMAL
BH CV LOWER VASCULAR RIGHT POPLITEAL SPONT: NORMAL
BH CV LOWER VASCULAR RIGHT POSTERIOR TIBIAL COMPRESS: NORMAL
BH CV LOWER VASCULAR RIGHT PROFUNDA FEMORAL COMPRESS: NORMAL
BH CV LOWER VASCULAR RIGHT PROXIMAL FEMORAL COMPRESS: NORMAL
BH CV LOWER VASCULAR RIGHT SAPHENOFEMORAL JUNCTION COMPRESS: NORMAL
MAXIMAL PREDICTED HEART RATE: 145 BPM
STRESS TARGET HR: 123 BPM

## 2022-09-09 PROCEDURE — 93971 EXTREMITY STUDY: CPT

## 2022-09-09 NOTE — PROGRESS NOTES
Subjective   Jody Simmons is a 76 y.o. female. Patient is here today for   Chief Complaint   Patient presents with   • Leg Swelling     RIGHT LEG SWELLING FOR THE LAST WEEK          Vitals:    08/31/22 1439   BP: 126/72   Pulse: 86   Resp: 18   Temp: 98.2 °F (36.8 °C)   SpO2: 96%     Body mass index is 34.26 kg/m².      Past Medical History:   Diagnosis Date   • Bilateral malignant neoplasm of overlapping sites of breast in female (HCC) 02/28/2022   • Dyslipidemia    • Endometrial polyp 04/21/2015    benign    • Fibroid    • Frozen shoulder 1980s   • H/O pelvic mass    • Hyperlipidemia    • Osteopenia    • PMB (postmenopausal bleeding)    • Primary osteoarthritis of right knee 3/11/2022   • Screen for colon cancer 2021    Cologuard: negative result   • UTI (urinary tract infection)       No Known Allergies   Social History     Socioeconomic History   • Marital status: Single   Tobacco Use   • Smoking status: Never Smoker   • Smokeless tobacco: Never Used   Vaping Use   • Vaping Use: Never used   Substance and Sexual Activity   • Alcohol use: Yes     Alcohol/week: 1.0 standard drink     Types: 1 Glasses of wine per week     Comment: SOCIAL rare   • Drug use: No   • Sexual activity: Not Currently     Partners: Male     Birth control/protection: Post-menopausal        Current Outpatient Medications:   •  acetaminophen (TYLENOL) 325 MG tablet, Take 650 mg by mouth Every 6 (Six) Hours As Needed for Mild Pain ., Disp: , Rfl:   •  Ascorbic Acid (VITAMIN C PO), Take  by mouth Daily., Disp: , Rfl:   •  atorvastatin (LIPITOR) 10 MG tablet, Take 1 tablet by mouth Daily., Disp: 90 tablet, Rfl: 3  •  Calcium Carbonate-Vitamin D (CALTRATE 600+D PO), Take  by mouth., Disp: , Rfl:   •  Cholecalciferol (VITAMIN D) 1000 UNITS tablet, Take 1,000 Units by mouth Daily., Disp: , Rfl:   •  Diclofenac Sodium (VOLTAREN) 1 % gel gel, Apply 4 g topically to the appropriate area as directed 4 (Four) Times a Day As Needed (pain)., Disp: 150 g,  Rfl: 0  •  losartan (COZAAR) 100 MG tablet, Take 1 tablet by mouth Daily., Disp: 90 tablet, Rfl: 3  •  Multiple Vitamin (MULTI VITAMIN DAILY PO), Take 1 tablet by mouth Daily., Disp: , Rfl:   •  Multiple Vitamins-Minerals (ZINC PO), Take 1 tablet by mouth Daily., Disp: , Rfl:      Objective     She has had some swelling in her right lower extremity for about a week.  This is from about the mid calf distally.    This is not painful.        Leg Swelling         Review of Systems   Constitutional: Negative.    HENT: Negative.    Respiratory: Negative.    Cardiovascular:        She has swelling in the right lower extremity and described in HPI.  This is been going on about a week.       Physical Exam  Vitals and nursing note reviewed.   Constitutional:       Comments: Pleasant, neatly groomed, no distress.   Cardiovascular:      Rate and Rhythm: Regular rhythm.      Pulses: Normal pulses.      Heart sounds: Normal heart sounds.   Pulmonary:      Breath sounds: Normal breath sounds.   Musculoskeletal:      Comments: She has some nontender edema which is pitting in the right lower extremity.  There are no palpable cords.  Negative Homans' sign.   Neurological:      Mental Status: She is oriented to person, place, and time.   Psychiatric:         Mood and Affect: Mood normal.         Behavior: Behavior normal.         Thought Content: Thought content normal.           Problems Addressed this Visit        Symptoms and Signs    Leg edema, right - Primary    Relevant Orders    Duplex Venous Lower Extremity - Right CAR      Diagnoses       Codes Comments    Leg edema, right    -  Primary ICD-10-CM: R60.0  ICD-9-CM: 782.3             PLAN  I think her right lower extremity edema is most likely due to venous insufficiency.    I will get a venous Doppler to investigate.    If she notes worsening swelling, erythema or pain, she will let me know.  No follow-ups on file.  Answers for HPI/ROS submitted by the patient on  8/31/2022  Please describe your symptoms.: Swollen left leg  Have you had these symptoms before?: Yes  How long have you been having these symptoms?: 5-7 days  Please list any medications you are currently taking for this condition.: None  Please describe any probable cause for these symptoms. : Unknown  What is the primary reason for your visit?: Other

## 2022-09-23 NOTE — PROGRESS NOTES
Patient: Jody Simmons  YOB: 1945  Date of Service: 9/23/2022    Chief Complaints: Right knee pain    Subjective:    History of Present Illness: Pt is seen in the office today with complaints of right knee pain I last saw her in February if it was felt she had some degenerative changes and had an injection at that time and did get relief.  We talked about other option [facility] injections referral to Dr. Corey.  She did have breast cancer and has backed off her activity she feels like she is gained weight and her knee is gotten worse.  No new history injury change in activity.          Allergies: No Known Allergies    Medications:   Home Medications:  Current Outpatient Medications on File Prior to Visit   Medication Sig   • acetaminophen (TYLENOL) 325 MG tablet Take 650 mg by mouth Every 6 (Six) Hours As Needed for Mild Pain .   • Ascorbic Acid (VITAMIN C PO) Take  by mouth Daily.   • atorvastatin (LIPITOR) 10 MG tablet Take 1 tablet by mouth Daily.   • Calcium Carbonate-Vitamin D (CALTRATE 600+D PO) Take  by mouth.   • Cholecalciferol (VITAMIN D) 1000 UNITS tablet Take 1,000 Units by mouth Daily.   • Diclofenac Sodium (VOLTAREN) 1 % gel gel Apply 4 g topically to the appropriate area as directed 4 (Four) Times a Day As Needed (pain).   • losartan (COZAAR) 100 MG tablet Take 1 tablet by mouth Daily.   • Multiple Vitamin (MULTI VITAMIN DAILY PO) Take 1 tablet by mouth Daily.   • Multiple Vitamins-Minerals (ZINC PO) Take 1 tablet by mouth Daily.     No current facility-administered medications on file prior to visit.     Current Medications:  Scheduled Meds:  Continuous Infusions:No current facility-administered medications for this visit.    PRN Meds:.    I have reviewed the patient's medical history in detail and updated the computerized patient record.  Review and summarization of old records include:    Past Medical History:   Diagnosis Date   • Bilateral malignant neoplasm of overlapping sites  of breast in female (HCC) 02/28/2022   • Dyslipidemia    • Endometrial polyp 04/21/2015    benign    • Fibroid    • Frozen shoulder 1980s   • H/O pelvic mass    • Hyperlipidemia    • Osteopenia    • PMB (postmenopausal bleeding)    • Primary osteoarthritis of right knee 3/11/2022   • Screen for colon cancer 2021    Cologuard: negative result   • UTI (urinary tract infection)         Past Surgical History:   Procedure Laterality Date   • BREAST BIOPSY Bilateral 02/18/2022    STEROTACTIC BREAST BIOPSY; Left Breast Site #1 Outer Middle bowtie biopsy clip; Left Breast Site #2 12:00 position barbell clip deployed; Right Breast Upper Inner Posterior Bowtie clip deployed;   • COLONOSCOPY N/A 2009   • HYSTERECTOMY  2015   • OOPHORECTOMY     • SKIN LESION EXCISION      excision right post. calf (benign for melaoma)        Social History     Occupational History   • Occupation: Teacher     Employer: RETIRED   Tobacco Use   • Smoking status: Never Smoker   • Smokeless tobacco: Never Used   Vaping Use   • Vaping Use: Never used   Substance and Sexual Activity   • Alcohol use: Yes     Alcohol/week: 1.0 standard drink     Types: 1 Glasses of wine per week     Comment: SOCIAL rare   • Drug use: No   • Sexual activity: Not Currently     Partners: Male     Birth control/protection: Post-menopausal      Social History     Social History Narrative   • Not on file        Family History   Problem Relation Age of Onset   • Diabetes Mother    • Heart disease Father    • Diabetes Brother    • Cancer Brother         prostate       ROS: 14 point review of systems was performed and was negative except for documented findings in HPI and today's encounter.     Allergies: No Known Allergies  Constitutional:  Denies fever, shaking or chills   Eyes:  Denies change in visual acuity   HENT:  Denies nasal congestion or sore throat   Respiratory:  Denies cough or shortness of breath   Cardiovascular:  Denies chest pain or severe LE edema   GI:  Denies  abdominal pain, nausea, vomiting, bloody stools or diarrhea   Musculoskeletal:  Numbness, tingling, or loss of motor function only as noted above in history of present illness.  : Denies painful urination or hematuria  Integument:  Denies rash, lesion or ulceration   Neurologic:  Denies headache or focal weakness  Endocrine:  Denies lymphadenopathy  Psych:  Denies confusion or change in mental status   Hem:  Denies active bleeding      Physical Exam: 76 y.o. female  Wt Readings from Last 3 Encounters:   08/31/22 74.8 kg (165 lb)   03/11/22 72.1 kg (159 lb)   03/08/22 72.2 kg (159 lb 3.2 oz)       There is no height or weight on file to calculate BMI.  No height and weight on file for this encounter.  There were no vitals filed for this visit.  Vital signs reviewed.   General Appearance:    Alert, cooperative, in no acute distress                    Ortho exam      physical exam the right  knee she has mild effusion no overlying skin changes no lymphedema no lymphadenopathy.  She is sitting in a genu valgum position.  She has -5° of extension flexion is to 125 she has palpable tenderness laterally more than medially and crepitus with range of motion.  Her calf is soft and nontender       .time    Assessment: I did review x-rays from last visit she does have degenerative changes laterally    Plan: Plan injection today but we did have a long discussion regarding quad and core strengthening weight management we talked about Visco talked about ultimately arthroplasty.  We will start with a steroid injection today  Follow up as indicated.  Ice, elevate, and rest as needed.  Discussed conservative measures of pain control including ice, bracing.  Also talked about the importance of strengthening and maintaining ideal body weight    Yael Echeverria M.D.      Large Joint Arthrocentesis: R knee  Date/Time: 9/26/2022 3:15 PM  Consent given by: patient  Site marked: site marked  Timeout: Immediately prior to procedure a time  out was called to verify the correct patient, procedure, equipment, support staff and site/side marked as required   Supporting Documentation  Indications: pain, joint swelling and diagnostic evaluation   Procedure Details  Location: knee - R knee  Preparation: Patient was prepped and draped in the usual sterile fashion  Needle gauge: 21G.  Medications administered: 2 mL lidocaine PF 1% 1 %; 80 mg methylPREDNISolone acetate 40 MG/ML  Patient tolerance: patient tolerated the procedure well with no immediate complications

## 2022-09-26 ENCOUNTER — OFFICE VISIT (OUTPATIENT)
Dept: ORTHOPEDIC SURGERY | Facility: CLINIC | Age: 77
End: 2022-09-26

## 2022-09-26 VITALS — HEIGHT: 58 IN | TEMPERATURE: 98.6 F | WEIGHT: 165 LBS | BODY MASS INDEX: 34.63 KG/M2

## 2022-09-26 DIAGNOSIS — M17.11 PRIMARY LOCALIZED OSTEOARTHROSIS OF RIGHT LOWER LEG: Primary | ICD-10-CM

## 2022-09-26 PROCEDURE — 20610 DRAIN/INJ JOINT/BURSA W/O US: CPT | Performed by: ORTHOPAEDIC SURGERY

## 2022-09-26 PROCEDURE — 99212 OFFICE O/P EST SF 10 MIN: CPT | Performed by: ORTHOPAEDIC SURGERY

## 2022-09-26 RX ADMIN — METHYLPREDNISOLONE ACETATE 80 MG: 40 INJECTION, SUSPENSION INTRA-ARTICULAR; INTRALESIONAL; INTRAMUSCULAR; SOFT TISSUE at 15:15

## 2022-09-26 RX ADMIN — LIDOCAINE HYDROCHLORIDE 2 ML: 10 INJECTION, SOLUTION EPIDURAL; INFILTRATION; INTRACAUDAL; PERINEURAL at 15:15

## 2022-09-27 RX ORDER — METHYLPREDNISOLONE ACETATE 40 MG/ML
80 INJECTION, SUSPENSION INTRA-ARTICULAR; INTRALESIONAL; INTRAMUSCULAR; SOFT TISSUE
Status: COMPLETED | OUTPATIENT
Start: 2022-09-26 | End: 2022-09-26

## 2022-09-27 RX ORDER — LIDOCAINE HYDROCHLORIDE 10 MG/ML
2 INJECTION, SOLUTION EPIDURAL; INFILTRATION; INTRACAUDAL; PERINEURAL
Status: COMPLETED | OUTPATIENT
Start: 2022-09-26 | End: 2022-09-26

## 2022-11-15 DIAGNOSIS — E78.2 MIXED HYPERLIPIDEMIA: Primary | ICD-10-CM

## 2022-11-15 DIAGNOSIS — E55.9 MILD VITAMIN D DEFICIENCY: ICD-10-CM

## 2022-11-15 DIAGNOSIS — R73.9 HYPERGLYCEMIA: ICD-10-CM

## 2022-11-18 LAB
25(OH)D3+25(OH)D2 SERPL-MCNC: 35.5 NG/ML (ref 30–100)
ALBUMIN SERPL-MCNC: 4.5 G/DL (ref 3.5–5.2)
ALBUMIN/GLOB SERPL: 2.6 G/DL
ALP SERPL-CCNC: 138 U/L (ref 39–117)
ALT SERPL-CCNC: 23 U/L (ref 1–33)
APPEARANCE UR: CLEAR
AST SERPL-CCNC: 24 U/L (ref 1–32)
BACTERIA #/AREA URNS HPF: NORMAL /HPF
BASOPHILS # BLD AUTO: 0.03 10*3/MM3 (ref 0–0.2)
BASOPHILS NFR BLD AUTO: 0.6 % (ref 0–1.5)
BILIRUB SERPL-MCNC: 0.4 MG/DL (ref 0–1.2)
BILIRUB UR QL STRIP: NEGATIVE
BUN SERPL-MCNC: 9 MG/DL (ref 8–23)
BUN/CREAT SERPL: 10.5 (ref 7–25)
CALCIUM SERPL-MCNC: 9.3 MG/DL (ref 8.6–10.5)
CASTS URNS MICRO: NORMAL
CHLORIDE SERPL-SCNC: 104 MMOL/L (ref 98–107)
CHOLEST SERPL-MCNC: 182 MG/DL (ref 0–200)
CO2 SERPL-SCNC: 28.6 MMOL/L (ref 22–29)
COLOR UR: YELLOW
CREAT SERPL-MCNC: 0.86 MG/DL (ref 0.57–1)
EGFRCR SERPLBLD CKD-EPI 2021: 70.6 ML/MIN/1.73
EOSINOPHIL # BLD AUTO: 0.12 10*3/MM3 (ref 0–0.4)
EOSINOPHIL NFR BLD AUTO: 2.3 % (ref 0.3–6.2)
EPI CELLS #/AREA URNS HPF: NORMAL /HPF
ERYTHROCYTE [DISTWIDTH] IN BLOOD BY AUTOMATED COUNT: 12.3 % (ref 12.3–15.4)
GLOBULIN SER CALC-MCNC: 1.7 GM/DL
GLUCOSE SERPL-MCNC: 113 MG/DL (ref 65–99)
GLUCOSE UR QL STRIP: NEGATIVE
HBA1C MFR BLD: 6.1 % (ref 4.8–5.6)
HCT VFR BLD AUTO: 42.1 % (ref 34–46.6)
HDLC SERPL-MCNC: 48 MG/DL (ref 40–60)
HGB BLD-MCNC: 14 G/DL (ref 12–15.9)
HGB UR QL STRIP: NEGATIVE
IMM GRANULOCYTES # BLD AUTO: 0.01 10*3/MM3 (ref 0–0.05)
IMM GRANULOCYTES NFR BLD AUTO: 0.2 % (ref 0–0.5)
KETONES UR QL STRIP: NEGATIVE
LDLC SERPL CALC-MCNC: 99 MG/DL (ref 0–100)
LDLC/HDLC SERPL: 1.95 {RATIO}
LEUKOCYTE ESTERASE UR QL STRIP: NEGATIVE
LYMPHOCYTES # BLD AUTO: 0.88 10*3/MM3 (ref 0.7–3.1)
LYMPHOCYTES NFR BLD AUTO: 16.9 % (ref 19.6–45.3)
MCH RBC QN AUTO: 32.8 PG (ref 26.6–33)
MCHC RBC AUTO-ENTMCNC: 33.3 G/DL (ref 31.5–35.7)
MCV RBC AUTO: 98.6 FL (ref 79–97)
MONOCYTES # BLD AUTO: 0.62 10*3/MM3 (ref 0.1–0.9)
MONOCYTES NFR BLD AUTO: 11.9 % (ref 5–12)
NEUTROPHILS # BLD AUTO: 3.54 10*3/MM3 (ref 1.7–7)
NEUTROPHILS NFR BLD AUTO: 68.1 % (ref 42.7–76)
NITRITE UR QL STRIP: NEGATIVE
NRBC BLD AUTO-RTO: 0 /100 WBC (ref 0–0.2)
PH UR STRIP: 5.5 [PH] (ref 5–8)
PLATELET # BLD AUTO: 211 10*3/MM3 (ref 140–450)
POTASSIUM SERPL-SCNC: 4.5 MMOL/L (ref 3.5–5.2)
PROT SERPL-MCNC: 6.2 G/DL (ref 6–8.5)
PROT UR QL STRIP: NEGATIVE
RBC # BLD AUTO: 4.27 10*6/MM3 (ref 3.77–5.28)
RBC #/AREA URNS HPF: NORMAL /HPF
SODIUM SERPL-SCNC: 141 MMOL/L (ref 136–145)
SP GR UR STRIP: 1.01 (ref 1–1.03)
TRIGL SERPL-MCNC: 202 MG/DL (ref 0–150)
TSH SERPL DL<=0.005 MIU/L-ACNC: 2.09 UIU/ML (ref 0.27–4.2)
UROBILINOGEN UR STRIP-MCNC: NORMAL MG/DL
VLDLC SERPL CALC-MCNC: 35 MG/DL (ref 5–40)
WBC # BLD AUTO: 5.2 10*3/MM3 (ref 3.4–10.8)
WBC #/AREA URNS HPF: NORMAL /HPF

## 2022-11-21 ENCOUNTER — OFFICE VISIT (OUTPATIENT)
Dept: FAMILY MEDICINE CLINIC | Facility: CLINIC | Age: 77
End: 2022-11-21

## 2022-11-21 VITALS
DIASTOLIC BLOOD PRESSURE: 90 MMHG | BODY MASS INDEX: 35.05 KG/M2 | SYSTOLIC BLOOD PRESSURE: 170 MMHG | HEART RATE: 66 BPM | TEMPERATURE: 98 F | OXYGEN SATURATION: 97 % | WEIGHT: 167 LBS | HEIGHT: 58 IN

## 2022-11-21 DIAGNOSIS — Z23 ENCOUNTER FOR VACCINATION: Primary | ICD-10-CM

## 2022-11-21 DIAGNOSIS — Z00.00 MEDICARE ANNUAL WELLNESS VISIT, SUBSEQUENT: ICD-10-CM

## 2022-11-21 DIAGNOSIS — E66.9 OBESITY (BMI 30.0-34.9): ICD-10-CM

## 2022-11-21 DIAGNOSIS — R73.9 HYPERGLYCEMIA: ICD-10-CM

## 2022-11-21 DIAGNOSIS — E78.2 MIXED HYPERLIPIDEMIA: ICD-10-CM

## 2022-11-21 DIAGNOSIS — I10 ESSENTIAL HYPERTENSION: ICD-10-CM

## 2022-11-21 PROCEDURE — G0439 PPPS, SUBSEQ VISIT: HCPCS | Performed by: INTERNAL MEDICINE

## 2022-11-21 PROCEDURE — 1159F MED LIST DOCD IN RCRD: CPT | Performed by: INTERNAL MEDICINE

## 2022-11-21 PROCEDURE — 1170F FXNL STATUS ASSESSED: CPT | Performed by: INTERNAL MEDICINE

## 2022-11-21 PROCEDURE — G0008 ADMIN INFLUENZA VIRUS VAC: HCPCS | Performed by: INTERNAL MEDICINE

## 2022-11-21 PROCEDURE — 90662 IIV NO PRSV INCREASED AG IM: CPT | Performed by: INTERNAL MEDICINE

## 2022-11-21 NOTE — PROGRESS NOTES
The ABCs of the Annual Wellness Visit  Subsequent Medicare Wellness Visit    Chief Complaint   Patient presents with   • Medicare Wellness-subsequent      Subjective    History of Present Illness:  Jody Simmons is a 76 y.o. female who presents for a Subsequent Medicare Wellness Visit.    The following portions of the patient's history were reviewed and   updated as appropriate: allergies, current medications, past family history, past medical history, past social history, past surgical history and problem list.    Compared to one year ago, the patient feels her physical   health is the same.    Compared to one year ago, the patient feels her mental   health is better.    Recent Hospitalizations:  She was not admitted to the hospital during the last year.       Current Medical Providers:  Patient Care Team:  Manolo Reddy MD as PCP - General (Internal Medicine)  Manolo Reddy MD as Referring Physician (Internal Medicine)  Carlos Travis MD as Consulting Physician (Hematology and Oncology)    Outpatient Medications Prior to Visit   Medication Sig Dispense Refill   • acetaminophen (TYLENOL) 325 MG tablet Take 650 mg by mouth Every 6 (Six) Hours As Needed for Mild Pain .     • Ascorbic Acid (VITAMIN C PO) Take  by mouth Daily.     • atorvastatin (LIPITOR) 10 MG tablet Take 1 tablet by mouth Daily. 90 tablet 3   • Calcium Carbonate-Vitamin D (CALTRATE 600+D PO) Take  by mouth.     • Cholecalciferol (VITAMIN D) 1000 UNITS tablet Take 1,000 Units by mouth Daily.     • losartan (COZAAR) 100 MG tablet Take 1 tablet by mouth Daily. 90 tablet 3   • Multiple Vitamin (MULTI VITAMIN DAILY PO) Take 1 tablet by mouth Daily.     • Multiple Vitamins-Minerals (ZINC PO) Take 1 tablet by mouth Daily.     • Diclofenac Sodium (VOLTAREN) 1 % gel gel Apply 4 g topically to the appropriate area as directed 4 (Four) Times a Day As Needed (pain). 150 g 0     No facility-administered medications prior to visit.       No opioid  "medication identified on active medication list. I have reviewed chart for other potential  high risk medication/s and harmful drug interactions in the elderly.          Aspirin is not on active medication list.  Aspirin use is not indicated based on review of current medical condition/s. Risk of harm outweighs potential benefits.  .    Patient Active Problem List   Diagnosis   • Hyperglycemia   • Mixed hyperlipidemia   • Osteopenia   • Mild vitamin D deficiency   • Dysesthesia   • Essential hypertension   • Chronic left-sided headaches   • Dizziness   • Balance disorder   • Acute otitis media   • Cough   • Rash   • Edema   • Serous otitis media   • Obesity (BMI 30.0-34.9)   • Prediabetes   • Ductal carcinoma in situ (DCIS) of right breast   • Ductal carcinoma in situ (DCIS) of left breast   • Primary osteoarthritis of right knee   • Leg edema, right   • Medicare annual wellness visit, subsequent     Advance Care Planning  Advance Directive is on file.  ACP discussion was held with the patient during this visit. Patient has an advance directive in EMR which is still valid.     Review of Systems   Constitutional: Negative.    HENT: Negative.    Respiratory: Negative.    Cardiovascular: Negative.    Musculoskeletal: Negative.    Psychiatric/Behavioral: Negative.         Objective    Vitals:    11/21/22 1450   BP: 170/90   Pulse: 66   Temp: 98 °F (36.7 °C)   SpO2: 97%   Weight: 75.8 kg (167 lb)   Height: 147.3 cm (58\")     Estimated body mass index is 34.9 kg/m² as calculated from the following:    Height as of this encounter: 147.3 cm (58\").    Weight as of this encounter: 75.8 kg (167 lb).    BMI is >= 30 and <35. (Class 1 Obesity). The following options were offered after discussion;: weight loss educational material (shared in after visit summary)      Does the patient have evidence of cognitive impairment? No    Physical Exam  Vitals and nursing note reviewed.   Constitutional:       Appearance: Normal appearance. " She is obese. She is not ill-appearing or diaphoretic.      Comments: Pleasant, neatly groomed, BMI 34.  No distress.   Neck:      Vascular: No carotid bruit.   Cardiovascular:      Rate and Rhythm: Regular rhythm.      Heart sounds: Normal heart sounds. No murmur heard.    No gallop.   Pulmonary:      Effort: No respiratory distress.      Breath sounds: Normal breath sounds. No wheezing or rales.   Neurological:      Mental Status: She is alert and oriented to person, place, and time.   Psychiatric:         Mood and Affect: Mood normal.         Behavior: Behavior normal.         Thought Content: Thought content normal.       Lab Results   Component Value Date    CHLPL 182 11/17/2022    TRIG 202 (H) 11/17/2022    HDL 48 11/17/2022    LDL 99 11/17/2022    VLDL 35 11/17/2022    HGBA1C 6.10 (H) 11/17/2022            HEALTH RISK ASSESSMENT    Smoking Status:  Social History     Tobacco Use   Smoking Status Never   Smokeless Tobacco Never     Alcohol Consumption:  Social History     Substance and Sexual Activity   Alcohol Use Yes   • Alcohol/week: 1.0 standard drink   • Types: 1 Glasses of wine per week    Comment: SOCIAL rare     Fall Risk Screen:    STEADI Fall Risk Assessment was completed, and patient is at LOW risk for falls.Assessment completed on:11/21/2022    Depression Screening:  PHQ-2/PHQ-9 Depression Screening 11/21/2022   Retired PHQ-9 Total Score -   Retired Total Score -   Little Interest or Pleasure in Doing Things 0-->not at all   Feeling Down, Depressed or Hopeless 0-->not at all   PHQ-9: Brief Depression Severity Measure Score 0       Health Habits and Functional and Cognitive Screening:  Functional & Cognitive Status 11/21/2022   Do you have difficulty preparing food and eating? No   Do you have difficulty bathing yourself, getting dressed or grooming yourself? No   Do you have difficulty using the toilet? No   Do you have difficulty moving around from place to place? No   Do you have trouble with  steps or getting out of a bed or a chair? No   Current Diet Other        Current Diet Comment Combo   Dental Exam Up to date   Eye Exam Up to date   Exercise (times per week) 3 times per week   Current Exercises Include Walking;Yard Work   Current Exercise Activities Include -   Do you need help using the phone?  No   Are you deaf or do you have serious difficulty hearing?  No   Do you need help with transportation? No   Do you need help shopping? No   Do you need help preparing meals?  No   Do you need help with housework?  No   Do you need help with laundry? No   Do you need help taking your medications? No   Do you need help managing money? No   Do you ever drive or ride in a car without wearing a seat belt? No   Have you felt unusual stress, anger or loneliness in the last month? No   Who do you live with? Alone   If you need help, do you have trouble finding someone available to you? No   Have you been bothered in the last four weeks by sexual problems? No   Do you have difficulty concentrating, remembering or making decisions? No       Age-appropriate Screening Schedule:  Refer to the list below for future screening recommendations based on patient's age, sex and/or medical conditions. Orders for these recommended tests are listed in the plan section. The patient has been provided with a written plan.    Health Maintenance   Topic Date Due   • MAMMOGRAM  03/16/2023   • DXA SCAN  06/17/2023   • LIPID PANEL  11/17/2023   • TDAP/TD VACCINES (2 - Td or Tdap) 04/18/2029   • INFLUENZA VACCINE  Completed   • ZOSTER VACCINE  Completed              Assessment & Plan   CMS Preventative Services Quick Reference  Risk Factors Identified During Encounter  Inactivity/Sedentary  Obesity/Overweight   The above risks/problems have been discussed with the patient.  Follow up actions/plans if indicated are seen below in the Assessment/Plan Section.  Pertinent information has been shared with the patient in the After Visit  Summary.    Diagnoses and all orders for this visit:    1. Encounter for vaccination (Primary)  -     Fluzone High-Dose 65+yrs ([id number])    2. Essential hypertension    3. Hyperglycemia    4. Obesity (BMI 30.0-34.9)    5. Medicare annual wellness visit, subsequent    6. Mixed hyperlipidemia    She and I reviewed her labs.    Her fasting glucose and hemoglobin A1c are not in the diabetic range but they are a bit elevated.    She is obese and to address her hyperglycemia have asked her to do her best to get regular aerobic activity per American Heart Association guidelines.  This would be brisk aerobic activity 30 minutes a day 5 days a week.  More would be better.    And decrease her caloric intake.  She intends to begin an exercise program soon.    We will give her a flu vaccine today.    Her hypercholesterolemia is well controlled today.    Her hypertension is not so well controlled though when I checked her blood pressure I got 115/80.  She checks her blood pressures at home and it tends to be better.  I asked her to check her blood pressure once a week for 4 weeks and to call me know with the results.  Told her that what we are looking for his systolic blood pressure of less than 130 and diastolic blood pressure less than 80.        Follow Up:   No follow-ups on file.     An After Visit Summary and PPPS were made available to the patient.

## 2022-11-22 PROBLEM — Z00.00 MEDICARE ANNUAL WELLNESS VISIT, SUBSEQUENT: Status: ACTIVE | Noted: 2022-11-22

## 2023-01-31 RX ORDER — LOSARTAN POTASSIUM 100 MG/1
100 TABLET ORAL DAILY
Qty: 90 TABLET | Refills: 3 | Status: SHIPPED | OUTPATIENT
Start: 2023-01-31

## 2023-01-31 RX ORDER — ATORVASTATIN CALCIUM 10 MG/1
10 TABLET, FILM COATED ORAL DAILY
Qty: 90 TABLET | Refills: 3 | Status: SHIPPED | OUTPATIENT
Start: 2023-01-31

## 2023-03-03 DIAGNOSIS — E78.2 MIXED HYPERLIPIDEMIA: ICD-10-CM

## 2023-03-03 DIAGNOSIS — R73.9 HYPERGLYCEMIA: ICD-10-CM

## 2023-03-03 DIAGNOSIS — I10 ESSENTIAL HYPERTENSION: Primary | ICD-10-CM

## 2023-03-08 LAB
ALBUMIN SERPL-MCNC: 4.3 G/DL (ref 3.5–5.2)
ALBUMIN/GLOB SERPL: 1.9 G/DL
ALP SERPL-CCNC: 144 U/L (ref 39–117)
ALT SERPL-CCNC: 22 U/L (ref 1–33)
AST SERPL-CCNC: 16 U/L (ref 1–32)
BASOPHILS # BLD AUTO: 0.04 10*3/MM3 (ref 0–0.2)
BASOPHILS NFR BLD AUTO: 0.9 % (ref 0–1.5)
BILIRUB SERPL-MCNC: 0.4 MG/DL (ref 0–1.2)
BUN SERPL-MCNC: 12 MG/DL (ref 8–23)
BUN/CREAT SERPL: 13.8 (ref 7–25)
CALCIUM SERPL-MCNC: 9.5 MG/DL (ref 8.6–10.5)
CHLORIDE SERPL-SCNC: 108 MMOL/L (ref 98–107)
CHOLEST SERPL-MCNC: 152 MG/DL (ref 0–200)
CHOLEST/HDLC SERPL: 3.53 {RATIO}
CO2 SERPL-SCNC: 26.6 MMOL/L (ref 22–29)
CREAT SERPL-MCNC: 0.87 MG/DL (ref 0.57–1)
EGFRCR SERPLBLD CKD-EPI 2021: 69.1 ML/MIN/1.73
EOSINOPHIL # BLD AUTO: 0.14 10*3/MM3 (ref 0–0.4)
EOSINOPHIL NFR BLD AUTO: 3.3 % (ref 0.3–6.2)
ERYTHROCYTE [DISTWIDTH] IN BLOOD BY AUTOMATED COUNT: 11.9 % (ref 12.3–15.4)
GLOBULIN SER CALC-MCNC: 2.3 GM/DL
GLUCOSE SERPL-MCNC: 115 MG/DL (ref 65–99)
HBA1C MFR BLD: 6 % (ref 4.8–5.6)
HCT VFR BLD AUTO: 43.5 % (ref 34–46.6)
HDLC SERPL-MCNC: 43 MG/DL (ref 40–60)
HGB BLD-MCNC: 15 G/DL (ref 12–15.9)
IMM GRANULOCYTES # BLD AUTO: 0.01 10*3/MM3 (ref 0–0.05)
IMM GRANULOCYTES NFR BLD AUTO: 0.2 % (ref 0–0.5)
LDLC SERPL CALC-MCNC: 88 MG/DL (ref 0–100)
LYMPHOCYTES # BLD AUTO: 1.24 10*3/MM3 (ref 0.7–3.1)
LYMPHOCYTES NFR BLD AUTO: 29 % (ref 19.6–45.3)
MCH RBC QN AUTO: 32.8 PG (ref 26.6–33)
MCHC RBC AUTO-ENTMCNC: 34.5 G/DL (ref 31.5–35.7)
MCV RBC AUTO: 95 FL (ref 79–97)
MONOCYTES # BLD AUTO: 0.61 10*3/MM3 (ref 0.1–0.9)
MONOCYTES NFR BLD AUTO: 14.3 % (ref 5–12)
NEUTROPHILS # BLD AUTO: 2.23 10*3/MM3 (ref 1.7–7)
NEUTROPHILS NFR BLD AUTO: 52.3 % (ref 42.7–76)
NRBC BLD AUTO-RTO: 0 /100 WBC (ref 0–0.2)
PLATELET # BLD AUTO: 205 10*3/MM3 (ref 140–450)
POTASSIUM SERPL-SCNC: 4.5 MMOL/L (ref 3.5–5.2)
PROT SERPL-MCNC: 6.6 G/DL (ref 6–8.5)
RBC # BLD AUTO: 4.58 10*6/MM3 (ref 3.77–5.28)
SODIUM SERPL-SCNC: 143 MMOL/L (ref 136–145)
TRIGL SERPL-MCNC: 118 MG/DL (ref 0–150)
VLDLC SERPL CALC-MCNC: 21 MG/DL (ref 5–40)
WBC # BLD AUTO: 4.27 10*3/MM3 (ref 3.4–10.8)

## 2023-03-10 ENCOUNTER — OFFICE VISIT (OUTPATIENT)
Dept: FAMILY MEDICINE CLINIC | Facility: CLINIC | Age: 78
End: 2023-03-10
Payer: MEDICARE

## 2023-03-10 VITALS
WEIGHT: 159 LBS | BODY MASS INDEX: 33.37 KG/M2 | SYSTOLIC BLOOD PRESSURE: 120 MMHG | OXYGEN SATURATION: 98 % | DIASTOLIC BLOOD PRESSURE: 72 MMHG | HEART RATE: 75 BPM | TEMPERATURE: 97.8 F | HEIGHT: 58 IN

## 2023-03-10 DIAGNOSIS — R73.03 PREDIABETES: ICD-10-CM

## 2023-03-10 DIAGNOSIS — I10 ESSENTIAL HYPERTENSION: ICD-10-CM

## 2023-03-10 DIAGNOSIS — G89.29 CHRONIC PAIN OF RIGHT KNEE: Primary | ICD-10-CM

## 2023-03-10 DIAGNOSIS — E78.2 MIXED HYPERLIPIDEMIA: ICD-10-CM

## 2023-03-10 DIAGNOSIS — M25.561 CHRONIC PAIN OF RIGHT KNEE: Primary | ICD-10-CM

## 2023-03-10 PROCEDURE — 3078F DIAST BP <80 MM HG: CPT | Performed by: INTERNAL MEDICINE

## 2023-03-10 PROCEDURE — 99213 OFFICE O/P EST LOW 20 MIN: CPT | Performed by: INTERNAL MEDICINE

## 2023-03-10 PROCEDURE — 3074F SYST BP LT 130 MM HG: CPT | Performed by: INTERNAL MEDICINE

## 2023-03-10 NOTE — PROGRESS NOTES
Subjective   Jody Simmons is a 77 y.o. female. Patient is here today for   Chief Complaint   Patient presents with   • Hyperlipidemia   • Hypertension          Vitals:    03/10/23 1004   BP: 120/72   Pulse: 75   Temp: 97.8 °F (36.6 °C)   SpO2: 98%     Body mass index is 33.23 kg/m².      Past Medical History:   Diagnosis Date   • Bilateral malignant neoplasm of overlapping sites of breast in female (HCC) 02/28/2022   • Dyslipidemia    • Endometrial polyp 04/21/2015    benign    • Fibroid    • Frozen shoulder 1980s   • H/O pelvic mass    • Hyperlipidemia    • Hypertension yrs ago   • Osteopenia    • PMB (postmenopausal bleeding)    • Primary osteoarthritis of right knee 03/11/2022   • Screen for colon cancer 2021    Cologuard: negative result   • UTI (urinary tract infection)    • Visual impairment Yrs ago    Glasses      No Known Allergies   Social History     Socioeconomic History   • Marital status: Single   Tobacco Use   • Smoking status: Never   • Smokeless tobacco: Never   Vaping Use   • Vaping Use: Never used   Substance and Sexual Activity   • Alcohol use: Yes     Alcohol/week: 1.0 standard drink     Types: 1 Glasses of wine per week     Comment: SOCIAL rare   • Drug use: No   • Sexual activity: Not Currently     Partners: Male     Birth control/protection: Post-menopausal        Current Outpatient Medications:   •  atorvastatin (LIPITOR) 10 MG tablet, Take 1 tablet by mouth Daily., Disp: 90 tablet, Rfl: 3  •  Calcium Carbonate-Vitamin D (CALTRATE 600+D PO), Take  by mouth., Disp: , Rfl:   •  Cholecalciferol (VITAMIN D) 1000 UNITS tablet, Take 1 tablet by mouth Daily., Disp: , Rfl:   •  losartan (COZAAR) 100 MG tablet, Take 1 tablet by mouth Daily., Disp: 90 tablet, Rfl: 3  •  Multiple Vitamin (MULTI VITAMIN DAILY PO), Take 1 tablet by mouth Daily., Disp: , Rfl:   •  acetaminophen (TYLENOL) 325 MG tablet, Take 2 tablets by mouth Every 6 (Six) Hours As Needed for Mild Pain., Disp: , Rfl:   •  metroNIDAZOLE  (METROCREAM) 0.75 % cream, APPLY TOPICALLY TO FACE EVERY DAY FOR ROSACEA, Disp: , Rfl:      Objective     History of Present Illness  She has pain in her right knee.  She has had a good experience with physical therapy regarding physical therapy for right knee pain and she requested to make the referral for her today.    She had some labs done last week which we reviewed today.  Hyperlipidemia    Hypertension         Review of Systems   Constitutional: Negative.    HENT: Negative.    Respiratory: Negative.    Cardiovascular: Negative.    Musculoskeletal:        She has right knee pain.  This is a chronic problem.  It improved with physical therapy.   Psychiatric/Behavioral: Negative.        Physical Exam  Vitals and nursing note reviewed.   Constitutional:       Appearance: Normal appearance.      Comments: Pleasant, neatly groomed, no distress.   Neck:      Vascular: No carotid bruit.   Cardiovascular:      Rate and Rhythm: Regular rhythm.      Heart sounds: Normal heart sounds. No murmur heard.    No gallop.   Pulmonary:      Effort: No respiratory distress.      Breath sounds: Normal breath sounds. No wheezing or rales.   Neurological:      Mental Status: She is alert and oriented to person, place, and time.   Psychiatric:         Mood and Affect: Mood normal.         Behavior: Behavior normal.         Thought Content: Thought content normal.           Problems Addressed this Visit        Cardiac and Vasculature    Mixed hyperlipidemia    Essential hypertension       Endocrine and Metabolic    Prediabetes   Other Visit Diagnoses     Chronic pain of right knee    -  Primary    Relevant Orders    Ambulatory Referral to Physical Therapy Evaluate and treat      Diagnoses       Codes Comments    Chronic pain of right knee    -  Primary ICD-10-CM: M25.561, G89.29  ICD-9-CM: 719.46, 338.29     Essential hypertension     ICD-10-CM: I10  ICD-9-CM: 401.9     Mixed hyperlipidemia     ICD-10-CM: E78.2  ICD-9-CM: 272.2      Prediabetes     ICD-10-CM: R73.03  ICD-9-CM: 790.29             PLAN  Her hypertension is well controlled.    Her mixed hyperlipidemia is well controlled on atorvastatin 10 mg daily.    She has chronic pain in her right knee.  She has benefited from physical therapy in the past regarding right knee pain.  I have referred her to physical therapy today.    I would like her to follow-up in about 6 months for Medicare annual wellness visit.  Fasting labs prior to that visit should include: Lipid profile, comprehensive metabolic panel, CBC, urinalysis, hemoglobin A1c,  No follow-ups on file.  Answers for HPI/ROS submitted by the patient on 3/3/2023  Please describe your symptoms.: 6 month regular checkup  Have you had these symptoms before?: Yes  How long have you been having these symptoms?: 1-4 days  Please list any medications you are currently taking for this condition.: Na  Please describe any probable cause for these symptoms. : Na  What is the primary reason for your visit?: Other

## 2023-04-05 ENCOUNTER — TREATMENT (OUTPATIENT)
Dept: PHYSICAL THERAPY | Facility: CLINIC | Age: 78
End: 2023-04-05
Payer: MEDICARE

## 2023-04-05 DIAGNOSIS — M25.561 CHRONIC PAIN OF RIGHT KNEE: Primary | ICD-10-CM

## 2023-04-05 DIAGNOSIS — G89.29 CHRONIC PAIN OF RIGHT KNEE: Primary | ICD-10-CM

## 2023-04-05 DIAGNOSIS — Z74.09 IMPAIRED FUNCTIONAL MOBILITY, BALANCE, GAIT, AND ENDURANCE: ICD-10-CM

## 2023-04-05 DIAGNOSIS — M79.89 SWELLING OF RIGHT LOWER EXTREMITY: ICD-10-CM

## 2023-04-05 PROCEDURE — 97140 MANUAL THERAPY 1/> REGIONS: CPT | Performed by: PHYSICAL THERAPIST

## 2023-04-05 PROCEDURE — 97162 PT EVAL MOD COMPLEX 30 MIN: CPT | Performed by: PHYSICAL THERAPIST

## 2023-04-05 PROCEDURE — 97110 THERAPEUTIC EXERCISES: CPT | Performed by: PHYSICAL THERAPIST

## 2023-04-05 NOTE — PROGRESS NOTES
Physical Therapy Initial Evaluation and Plan of Care  [facility] Physical Therapy - [facility]  [address], [address]  [place] [address]    Patient: Jody Simmons   : 1945  Diagnosis/ICD-10 Code:  Chronic pain of right knee [M25.561, G89.29]  Referring practitioner: Manolo Reddy MD  Today's Date: 2023    Subjective Evaluation    History of Present Illness  Mechanism of injury: History of right knee pain after twisted her knee fall of   Had therapy at that time with some good progress  Was diagnosed with breast cancer in  - lumpectomy surgery and radiation in  - no node involvement   Continued to have knee pain intermittently   Went back to see Dr Echeverria - September  - received steroid injection with some results  Interested in the gel injections  Wants to do some overseas travel in the fall  Takes Tylenol daily for pain      Patient Occupation: Retired  Pain  Current pain ratin  At best pain ratin  At worst pain ratin  Location: anterior medial joint, no radiation, knee swells  Quality: knife-like, sharp, dull ache, discomfort and tight  Relieving factors: rest, medications and heat  Aggravating factors: ambulation, squatting, stairs and standing  Progression: worsening    Social Support  Lives in: multiple-level home    Diagnostic Tests  X-ray: abnormal    Treatments  Previous treatment: physical therapy, medication and injection treatment  Current treatment: physical therapy  Patient Goals  Patient goals for therapy: increased strength  Patient goal: BE able to walk without pain, improve mobility            Objective          Observations     Additional Knee Observation Details  Symmetrical gait pattern  Some mid foot instability - pronation  Swelling noted in the right lower leg and ankle as well as the knee    Tenderness     Right Knee   Tenderness in the lateral joint line, medial joint line and medial patella.     Neurological  Testing     Sensation     Knee     Right Knee   Intact: light touch     Active Range of Motion     Right Knee   Flexion: 115 degrees with pain  Extension: 15 degrees   Extensor la degrees with pain    Patellar Static Positioning   Right Knee: lateral tilt    Strength/Myotome Testing     Right Knee   Flexion: 4+  Extension: 4+  Quadriceps contraction: fair    Tests     Right Knee   Positive patellar compression.   Negative anterior drawer, posterior drawer, valgus stress test at 0 degrees and varus stress test at 0 degrees.     Right Knee Flexibility Comments:   Decreased hamstring flexibility    Swelling     Left Knee Girth Measurement (cm)   Joint line: Some generalized swelling noted in the knee and lower leg.          Assessment & Plan     Assessment  Impairments: abnormal or restricted ROM, activity intolerance, impaired balance, impaired physical strength, lacks appropriate home exercise program, pain with function and weight-bearing intolerance  Functional Limitations: lifting, walking, standing and stooping  Assessment details: 77 y.o. female with end stage OA in right knee presents with: 1. Constant right knee pain, 2. Inability to achieve full knee extension, 3. Tenderness along the left knee anterior/medial joint line, 4. Decreased strength right LE, 5. Swelling right lower leg and knee, 6. Poor midfoot control in stance causing altered knee positioning, 7. Decreased tolerance for many normal ADL's to include walking and climbing steps, 8. Desire to travel in the fall without pain     Barriers to therapy: End stage OA, recent cancer treatments  Prognosis: good    Goals  Plan Goals: Short Term Goals: 4 weeks  Patient will be able to tolerate initial exercises  Patient will have pain <5/10  Patient will be able to obtain shoe inserts for improved foot positioning to decrease stress on the knee  Patient will be able to transfer sit to stand with 50% less pain    Long Term Goals: 8 weeks  Patient will be  independent in performing home exercise program.  Patient will have functional pain free knee AROM  Patient will be able to walk on level surfaces and inclines for >15 minutes without increased symptoms  Patient will be able to climb up steps with a reciprocal gait pattern without increased pain    Plan  Therapy options: will be seen for skilled therapy services  Other planned modality interventions: Positional taping  Planned therapy interventions: manual therapy, strengthening, stretching, home exercise program, orthotic fitting/training, neuromuscular re-education, balance/weight-bearing training and joint mobilization  Frequency: 2x week  Duration in visits: 12  Duration in weeks: 6  Treatment plan discussed with: patient  Plan details: Access Code: [email or web address]  URL: [email or web address]  Date: 04/05/2023  Prepared by: Ayesha Horowitz    Exercises  - Small Range Straight Leg Raise  - 1 x daily - 7 x weekly - 1 sets - 10 reps - 3 sec hold  - Sidelying Hip Abduction  - 1 x daily - 7 x weekly - 1 sets - 10 reps - 3 sec hold  - Supine Hip Adduction Isometric with Ball  - 1 x daily - 7 x weekly - 1 sets - 10 reps - 3 sec hold  - Standing Hip Extension with Counter Support  - 1 x daily - 7 x weekly - 1 sets - 10 reps  - Mini Squat with Counter Support  - 1 x daily - 7 x weekly - 1 sets - 10 reps        Manual Therapy:    10     mins  95474;  Therapeutic Exercise:    25     mins  78541;     Neuromuscular Luis A:    0    mins  45765;    Therapeutic Activity:     5     mins  45584;     Ultrasound                  __0_  mins  05488  Iontophoresis                 0    mins  64261    Electrical Stimulation     0    mins  64962 ([id number])  Traction                         _0  mins  03978     Evaluation Time:     25  mins  Timed Treatment:   40   mins   Total Treatment:     75   mins    PT: Ayesha Horowitz, PT     License Number: [id number]  Electronically signed by Ayesha Horowitz PT, 04/05/23, 9:37 AM EDT    Certification  Period: 4/5/2023 thru 7/3/2023  I certify that the therapy services are furnished while this patient is under my care.  The services outlined above are required by this patient, and will be reviewed every 90 days.         Physician Signature:__________________________________________________    PHYSICIAN: Manolo Reddy MD      DATE:     Please sign and return via fax to [email or web address] . Thank you, [facility].    PT SIGNATURE: Ayesha Horowitz, PT   [place] LICENSE:  [id number]

## 2023-04-07 ENCOUNTER — TREATMENT (OUTPATIENT)
Dept: PHYSICAL THERAPY | Facility: CLINIC | Age: 78
End: 2023-04-07
Payer: MEDICARE

## 2023-04-07 DIAGNOSIS — G89.29 CHRONIC PAIN OF RIGHT KNEE: Primary | ICD-10-CM

## 2023-04-07 DIAGNOSIS — M79.89 SWELLING OF RIGHT LOWER EXTREMITY: ICD-10-CM

## 2023-04-07 DIAGNOSIS — Z74.09 IMPAIRED FUNCTIONAL MOBILITY, BALANCE, GAIT, AND ENDURANCE: ICD-10-CM

## 2023-04-07 DIAGNOSIS — M25.561 RIGHT KNEE PAIN, UNSPECIFIED CHRONICITY: ICD-10-CM

## 2023-04-07 DIAGNOSIS — M25.561 CHRONIC PAIN OF RIGHT KNEE: Primary | ICD-10-CM

## 2023-04-07 PROCEDURE — 97110 THERAPEUTIC EXERCISES: CPT | Performed by: PHYSICAL THERAPIST

## 2023-04-07 PROCEDURE — 97140 MANUAL THERAPY 1/> REGIONS: CPT | Performed by: PHYSICAL THERAPIST

## 2023-04-07 PROCEDURE — 97530 THERAPEUTIC ACTIVITIES: CPT | Performed by: PHYSICAL THERAPIST

## 2023-04-07 NOTE — PROGRESS NOTES
Physical Therapy Daily Treatment Note    Visit Diagnoses:    ICD-10-CM ICD-9-CM   1. Chronic pain of right knee  M25.561 719.46    G89.29 338.29   2. Swelling of right lower extremity  M79.89 729.81   3. Impaired functional mobility, balance, gait, and endurance  Z74.09 V49.89   4. Right knee pain, unspecified chronicity  M25.561 719.46       VISIT#: 2      Jody Simmons reports: She had one treatment so far but was also her last year with [facility] with the same knee. It has just recently started flaring up. Her knee felt really good after last PT session but yesterday she went up her driveway, on an incline, an it set her knee off. Her cartilage is starting to deteriorate per her MD. Sometimes when she stands up her knee hyperextends. She is planning on getting some orthotics for her feet.   Current Pain Level:    0/10; Worst:   Medial R knee/10; Best:  0/10  Affected Area: medial R knee  Quality of Pain: achy, intermittently sharp with hyperextension  Functional Deficits/Irritating Factors: steps, slopes, prolonged walking, crossing her legs (picking up her leg)  Progression: no significant changes yet  Compliance with HEP Reported: Yes    Objective  Presents: edema present  Increased sets/reps of:  none   Increased resistance on:  Hip abduction  Added to Program: none    Therapeutic Activity: KT tape to R knee for medial joint line unloading, patella stabilization x 5 min      See Exercise, Manual, and Modality Logs for complete treatment.     Patient Education: Pt was educated on exercise biomechanical correctness, intensity, and speed.     Assessment:  Pt responding well to manual therapy and taping with increased comfort/decreased pain in knee, although temporary, after walking on incline.  Reviewed exercises and pt demonstrated good understanding of her HEP. Pt will continue to benefit from skilled PT interventions to address current functional deficits and impairments.       Plan: Progress to/Continue with  current program. Progress knee stabilization      Timed:  Manual Therapy:            8_    mins  51721;  Ultrasound:                     0      mins  99987;   Therapeutic Exercise:    25     mins  20208;     Neuromuscular Luis A:   0     mins  77495;    Therapeutic Activity:      8     mins  51280;      Iontophoresis              _0__   mins  Dry Needling               _0____   mins         Untimed:  Work Conditioning: __0__ mins 29265  Electrical Stimulation:    0    mins  77108 ( );  Mechanical Traction:       0        mins  48253;   Paraffin                       __0___  mins   Ice/Heat: __15__ mins      Timed Treatment:   41   mins   Total Treatment:     56   mins          Qing Vázquez PTA  [place] License # [id number]  Physical Therapist Assistant

## 2023-04-12 ENCOUNTER — TREATMENT (OUTPATIENT)
Dept: PHYSICAL THERAPY | Facility: CLINIC | Age: 78
End: 2023-04-12
Payer: MEDICARE

## 2023-04-12 DIAGNOSIS — M25.561 CHRONIC PAIN OF RIGHT KNEE: Primary | ICD-10-CM

## 2023-04-12 DIAGNOSIS — G89.29 CHRONIC PAIN OF RIGHT KNEE: Primary | ICD-10-CM

## 2023-04-12 DIAGNOSIS — Z74.09 IMPAIRED FUNCTIONAL MOBILITY, BALANCE, GAIT, AND ENDURANCE: ICD-10-CM

## 2023-04-12 DIAGNOSIS — M79.89 SWELLING OF RIGHT LOWER EXTREMITY: ICD-10-CM

## 2023-04-12 PROCEDURE — 97530 THERAPEUTIC ACTIVITIES: CPT | Performed by: PHYSICAL THERAPIST

## 2023-04-12 PROCEDURE — 97110 THERAPEUTIC EXERCISES: CPT | Performed by: PHYSICAL THERAPIST

## 2023-04-12 NOTE — PROGRESS NOTES
Physical Therapy Daily Treatment Note  [facility] Physical Therapy [facility]  [address], [address]  [place] [address]    Patient: Jody Simmons   : 1945  Referring practitioner: Manolo Reddy MD  Today's Date: 2023  Patient seen for 3 sessions    Visit Diagnoses:    ICD-10-CM ICD-9-CM   1. Chronic pain of right knee  M25.561 719.46    G89.29 338.29   2. Swelling of right lower extremity  M79.89 729.81   3. Impaired functional mobility, balance, gait, and endurance  Z74.09 V49.89       Subjective   Jody Simmons reports: that she had some swelling in the knee after last session.  Used ice and heat at home which relieved her symptoms.  Still has considerable ache in the knee.       Objective   Tenderness in medial joint line    Swelling in knee    See Exercise, Manual, and Modality Logs for complete treatment.     Patient Education:cont HEP, use ice after exercise    Assessment/Plan  Patient with good compliance to exercises but does need considerable cueing for proper technique.  Tape provides some pain relief.    Progress strengthening /stabilization /functional activity           Timed:  Manual Therapy:    5     mins  18776;  Therapeutic Exercise:    25/35     mins  56225;     Neuromuscular Luis A:    0    mins  73924;    Therapeutic Activity:     10     mins  23114;     Ultrasound:      0     mins  35374;    Iontophoresis              __0_   mins  Dry Needling               _____   mins        Untimed:  Electrical Stimulation:     0    mins  82781 ( );  Mechanical Traction:             mins  68160;   Paraffin                       _____  mins     Timed Treatment:   40   mins   Total Treatment:     60   mins    Ayesha Horowitz PT  [place] License # [id number]  Physical Therapist    Electronically signed by Ayesha Horowitz PT, 23, 3:39 PM EDT

## 2023-04-17 ENCOUNTER — TREATMENT (OUTPATIENT)
Dept: PHYSICAL THERAPY | Facility: CLINIC | Age: 78
End: 2023-04-17
Payer: MEDICARE

## 2023-04-17 DIAGNOSIS — G89.29 CHRONIC PAIN OF RIGHT KNEE: Primary | ICD-10-CM

## 2023-04-17 DIAGNOSIS — M25.561 CHRONIC PAIN OF RIGHT KNEE: Primary | ICD-10-CM

## 2023-04-17 DIAGNOSIS — Z74.09 IMPAIRED FUNCTIONAL MOBILITY, BALANCE, GAIT, AND ENDURANCE: ICD-10-CM

## 2023-04-17 DIAGNOSIS — M79.89 SWELLING OF RIGHT LOWER EXTREMITY: ICD-10-CM

## 2023-04-17 PROCEDURE — 97140 MANUAL THERAPY 1/> REGIONS: CPT | Performed by: PHYSICAL THERAPIST

## 2023-04-17 PROCEDURE — 97110 THERAPEUTIC EXERCISES: CPT | Performed by: PHYSICAL THERAPIST

## 2023-04-17 NOTE — PROGRESS NOTES
Physical Therapy Daily Treatment Note  [facility] Physical Therapy [facility]  [address], [address]  [place] [address]    Patient: Jody Simmons   : 1945  Referring practitioner: Manolo Reddy MD  Today's Date: 2023  Patient seen for 4 sessions    Visit Diagnoses:    ICD-10-CM ICD-9-CM   1. Chronic pain of right knee  M25.561 719.46    G89.29 338.29   2. Swelling of right lower extremity  M79.89 729.81   3. Impaired functional mobility, balance, gait, and endurance  Z74.09 V49.89       Subjective   Jody Simmons reports: that some days she feels good, others she has pain and swelling.      Objective   Tenderness in joint line     Walks with bent knee gait pattern on the right but symmetrical stance phase    See Exercise, Manual, and Modality Logs for complete treatment.     Patient Education: need for additional push with exercise    Assessment/Plan  Patient with intermittent pain in the right knee.  Required cueing for proper exercise technique with quad focus. Gets relief with positional taping    Progress strengthening /stabilization /functional activity           Timed:  Manual Therapy:   10     mins  18447;  Therapeutic Exercise:    15/30     mins  21382;     Neuromuscular Luis A:    0    mins  81700;    Therapeutic Activity:     5/15     mins  98600;     Ultrasound:      0     mins  73975;    Iontophoresis              __0_   mins  Dry Needling               _____   mins        Untimed:  Electrical Stimulation:     0    mins  49710 ( );  Mechanical Traction:             mins  82592;   Paraffin                       _____  mins     Timed Treatment:   30   mins   Total Treatment:     60   mins    Ayesha Horowitz PT  [place] License # [id number]  Physical Therapist    Electronically signed by Ayesha Horowitz PT, 23, 10:36 AM EDT

## 2023-04-20 NOTE — PROGRESS NOTES
"Physical Therapy Daily Treatment Note    Visit Diagnoses:    ICD-10-CM ICD-9-CM   1. Chronic pain of right knee  M25.561 719.46    G89.29 338.29   2. Swelling of right lower extremity  M79.89 729.81   3. Impaired functional mobility, balance, gait, and endurance  Z74.09 V49.89   4. Right knee pain, unspecified chronicity  M25.561 719.46       VISIT#: 5      Jody Simmons reports: Her R knee is a little sore today. It is her medial area. She was on her feet a lot yesterday - standing most of the day. The KT tape feels good for awhile but then she takes it off. It helps prevent her knee from feeling like it is going to give out when she steps down with her R LE. What bothers her is not being able to get rid of the swelling  Current Pain Level:    3-4/10; Worst:   Medial R knee/10; Best:  0/10  Affected Area: medial R knee  Quality of Pain: achy, intermittently sharp with hyperextension  Functional Deficits/Irritating Factors: steps, slopes, prolonged walking, crossing her legs (picking up her leg)  Progression: improving overall but have not \"hit a home run\"  Compliance with HEP Reported: Yes    Objective  Presents: edema present, Walks with bent knee gait pattern on the right but symmetrical stance phase  Increased sets/reps of:  Hip adduction   Increased resistance on:  Step height  Added to Program: none    AROM R knee extension = lacking 8 deg  Tender in vastus medialis quad and knee  Therapeutic Activity: KT tape to R knee for medial joint line unloading, patella stabilization x 5 min      See Exercise, Manual, and Modality Logs for complete treatment.     Patient Education: Pt was educated on exercise biomechanical correctness, intensity, and speed.     Assessment:  Pt with improved knee extension since initial evaluation but still lacking full range. Pt doing better functionally but with prolonged activity she gets increased pain. Responds well to the KT tape.  Pt will continue to benefit from skilled PT " interventions to address current functional deficits and impairments.       Plan: Progress to/Continue with current program.       Timed:  Manual Therapy:            10_    mins  28675;  Ultrasound:                     0      mins  31122;   Therapeutic Exercise:    30     mins  07903;     Neuromuscular Luis A:   0     mins  84730;    Therapeutic Activity:      15     mins  31909;      Iontophoresis              _0__   mins  Dry Needling               _0____   mins         Untimed:  Work Conditioning: __0__ mins 97330  Electrical Stimulation:    0    mins  52195 ( );  Mechanical Traction:       0        mins  48179;   Paraffin                       __0___  mins   Ice/Heat: __10__ mins      Timed Treatment:   55   mins   Total Treatment:     65   mins          LACIE Isaac License # [id number]  Physical Therapist Assistant

## 2023-04-21 ENCOUNTER — TREATMENT (OUTPATIENT)
Dept: PHYSICAL THERAPY | Facility: CLINIC | Age: 78
End: 2023-04-21
Payer: MEDICARE

## 2023-04-21 DIAGNOSIS — G89.29 CHRONIC PAIN OF RIGHT KNEE: Primary | ICD-10-CM

## 2023-04-21 DIAGNOSIS — Z74.09 IMPAIRED FUNCTIONAL MOBILITY, BALANCE, GAIT, AND ENDURANCE: ICD-10-CM

## 2023-04-21 DIAGNOSIS — M25.561 CHRONIC PAIN OF RIGHT KNEE: Primary | ICD-10-CM

## 2023-04-21 DIAGNOSIS — M25.561 RIGHT KNEE PAIN, UNSPECIFIED CHRONICITY: ICD-10-CM

## 2023-04-21 DIAGNOSIS — M79.89 SWELLING OF RIGHT LOWER EXTREMITY: ICD-10-CM

## 2023-04-26 ENCOUNTER — TREATMENT (OUTPATIENT)
Dept: PHYSICAL THERAPY | Facility: CLINIC | Age: 78
End: 2023-04-26
Payer: MEDICARE

## 2023-04-26 DIAGNOSIS — M79.89 SWELLING OF RIGHT LOWER EXTREMITY: ICD-10-CM

## 2023-04-26 DIAGNOSIS — M25.561 CHRONIC PAIN OF RIGHT KNEE: Primary | ICD-10-CM

## 2023-04-26 DIAGNOSIS — G89.29 CHRONIC PAIN OF RIGHT KNEE: Primary | ICD-10-CM

## 2023-04-26 DIAGNOSIS — Z74.09 IMPAIRED FUNCTIONAL MOBILITY, BALANCE, GAIT, AND ENDURANCE: ICD-10-CM

## 2023-04-26 NOTE — PROGRESS NOTES
Physical Therapy Daily Treatment Note  [facility] Therapy [facility]  [address], [address]  [place] [address]    Patient: Jody Simmons   : 1945  Referring practitioner: Manloo Reddy MD  Today's Date: 2023  Patient seen for 6 sessions    Visit Diagnoses:    ICD-10-CM ICD-9-CM   1. Chronic pain of right knee  M25.561 719.46    G89.29 338.29   2. Swelling of right lower extremity  M79.89 729.81   3. Impaired functional mobility, balance, gait, and endurance  Z74.09 V49.89       Subjective   Jody Simmons reports: that she got her new walking shoes and the new orthotics and wants to have me look at them for proper position.  States that we are making progress with the knee but still has some bad days causing aches in the knee.       Objective   Walks with a symmetrical gait pattern    See Exercise, Manual, and Modality Logs for complete treatment.     Patient Education: checked out purchased shoes, discussed tightening laces for better support    Assessment/Plan  Better foot position with new shoes/orthotics.   Less pain in the knee unless she has excessive WB.  Still unable to tolerate TKE but less pain with available ROM now.     Progress strengthening /stabilization /functional activity           Timed:  Manual Therapy:    8     mins  95039;  Therapeutic Exercise:    30/40     mins  11301;     Neuromuscular Luis A:    0    mins  31346;    Therapeutic Activity:     10     mins  60044;     Ultrasound:      0     mins  55905;    Iontophoresis              __0_   mins  Dry Needling               _____   mins        Untimed:  Electrical Stimulation:     0    mins  96198 ( );  Mechanical Traction:             mins  56507;   Paraffin                       _____  mins     Timed Treatment:   48   mins   Total Treatment:     60   mins    Ayesha Horowitz PT  [place] License # [id number]  Physical Therapist    Electronically signed by Ayesha Horowitz PT, 23, 2:06 PM EDT

## 2023-04-28 ENCOUNTER — TREATMENT (OUTPATIENT)
Dept: PHYSICAL THERAPY | Facility: CLINIC | Age: 78
End: 2023-04-28
Payer: MEDICARE

## 2023-04-28 DIAGNOSIS — M25.561 CHRONIC PAIN OF RIGHT KNEE: Primary | ICD-10-CM

## 2023-04-28 DIAGNOSIS — Z74.09 IMPAIRED FUNCTIONAL MOBILITY, BALANCE, GAIT, AND ENDURANCE: ICD-10-CM

## 2023-04-28 DIAGNOSIS — G89.29 CHRONIC PAIN OF RIGHT KNEE: Primary | ICD-10-CM

## 2023-04-28 DIAGNOSIS — M79.89 SWELLING OF RIGHT LOWER EXTREMITY: ICD-10-CM

## 2023-04-28 PROCEDURE — 97140 MANUAL THERAPY 1/> REGIONS: CPT | Performed by: PHYSICAL THERAPIST

## 2023-04-28 PROCEDURE — 97110 THERAPEUTIC EXERCISES: CPT | Performed by: PHYSICAL THERAPIST

## 2023-04-28 NOTE — PROGRESS NOTES
Physical Therapy Daily Treatment Note  [facility] Physical Therapy [facility]  [address], [address]  [place] [address]    Patient: Jody Simmons   : 1945  Referring practitioner: Manolo Reddy MD  Today's Date: 2023  Patient seen for 7 sessions    Visit Diagnoses:    ICD-10-CM ICD-9-CM   1. Chronic pain of right knee  M25.561 719.46    G89.29 338.29   2. Swelling of right lower extremity  M79.89 729.81   3. Impaired functional mobility, balance, gait, and endurance  Z74.09 V49.89       Subjective   Jody Simmons reports: that she was doing well until she over did it with her activities.  States that she went up/down many flights of steps wearing flip flops while cleaning her deck.         Objective   Presents with antalgic gait pattern on the right    Very tender to palpation medial joint line and distal medial hamstrings     See Exercise, Manual, and Modality Logs for complete treatment.     Patient Education: ice at home 2-3x/day, rest today due to yesterdays' over exertion     Assessment/Plan  Patient with much more pain and decreased activity tolerance today due to over exertion yesterday.  Unable to extend knee well today as she had patellar pain with attempts of knee extension.  Minimal exercise in clinic today to allow for rest.  Less pain after taping.    Progress strengthening /stabilization /functional activity           Timed:  Manual Therapy:    17     mins  15765;  Therapeutic Exercise:    13     mins  19479;     Neuromuscular Luis A:    0    mins  26768;    Therapeutic Activity:     0     mins  22238;     Ultrasound:      0     mins  44646;    Iontophoresis              __0_   mins  Dry Needling               _____   mins        Untimed:  Electrical Stimulation:     0    mins  29279 ( );  Mechanical Traction:             mins  08020;   Paraffin                       _____  mins     Timed Treatment:   30   mins   Total Treatment:     40   mins    Ayesha AMANDA  Carlos Manuel, PT  [place] License # [id number]  Physical Therapist    Electronically signed by Ayesha Horowitz, PT, 04/28/23, 11:29 AM EDT

## 2023-04-30 NOTE — PROGRESS NOTES
Physical Therapy Daily Treatment Note    Visit Diagnoses:    ICD-10-CM ICD-9-CM   1. Chronic pain of right knee  M25.561 719.46    G89.29 338.29   2. Swelling of right lower extremity  M79.89 729.81   3. Impaired functional mobility, balance, gait, and endurance  Z74.09 V49.89       VISIT#: 8      Jody Simmons reports: She is doing better than she was doing last week. She has learned to pace herself and not try to do so much in one day. Her shoes have also made a difference. Her knee feels stronger since Friday.   Current Pain Level:    4/10; Worst:   9/10 last week ; Best:  0/10  Affected Area: medial R knee  Quality of Pain: achy, intermittently sharp with hyperextension  Functional Deficits/Irritating Factors: steps, slopes, prolonged walking, crossing her legs (picking up her leg)  Progression: improving  Compliance with HEP Reported: Yes    Objective  Presents: edema present,  Increased sets/reps of:  Hip abduction   Increased resistance on:  none  Added to Program: step up and over, Calf stretch   R knee extension = lacking 14 deg;   Therapeutic Activity: KT tape to R knee for medial joint line unloading, patella stabilization x 5 min       See Exercise, Manual, and Modality Logs for complete treatment.     Patient Education: Pt was educated on exercise biomechanical correctness, intensity, and speed.     Assessment:  Pt knee has improved since last weeks set back after over doing her activity. She exhibits significant deficit in her knee extension affecting her gait pattern. Pt improved her compensation pattern with step up and over exercise - bending her R knee appropriately. She will continue to benefit from skilled PT to address current functional deficits and impairments.     Plan: Progress to/Continue with current program.       Timed:  Manual Therapy:            10_    mins  80516;  Ultrasound:                     0      mins  55698;   Therapeutic Exercise:    30     mins  83471;     Neuromuscular  uLis A:   0     mins  36029;    Therapeutic Activity:      15     mins  06770;      Iontophoresis              _0__   mins  Dry Needling               _0____   mins         Untimed:  Work Conditioning: __0__ mins 87217  Electrical Stimulation:    0    mins  68516 ( );  Mechanical Traction:       0        mins  45713;   Paraffin                       __0___  mins   Ice/Heat: __10__ mins      Timed Treatment:   55   mins   Total Treatment:     65   mins          Qing Vázquez PTA  [place] License # [id number]  Physical Therapist Assistant

## 2023-05-01 ENCOUNTER — TREATMENT (OUTPATIENT)
Dept: PHYSICAL THERAPY | Facility: CLINIC | Age: 78
End: 2023-05-01
Payer: MEDICARE

## 2023-05-01 DIAGNOSIS — Z74.09 IMPAIRED FUNCTIONAL MOBILITY, BALANCE, GAIT, AND ENDURANCE: ICD-10-CM

## 2023-05-01 DIAGNOSIS — M79.89 SWELLING OF RIGHT LOWER EXTREMITY: ICD-10-CM

## 2023-05-01 DIAGNOSIS — M25.561 CHRONIC PAIN OF RIGHT KNEE: Primary | ICD-10-CM

## 2023-05-01 DIAGNOSIS — G89.29 CHRONIC PAIN OF RIGHT KNEE: Primary | ICD-10-CM

## 2023-05-03 ENCOUNTER — TREATMENT (OUTPATIENT)
Dept: PHYSICAL THERAPY | Facility: CLINIC | Age: 78
End: 2023-05-03
Payer: MEDICARE

## 2023-05-03 DIAGNOSIS — G89.29 CHRONIC PAIN OF RIGHT KNEE: Primary | ICD-10-CM

## 2023-05-03 DIAGNOSIS — M79.89 SWELLING OF RIGHT LOWER EXTREMITY: ICD-10-CM

## 2023-05-03 DIAGNOSIS — Z74.09 IMPAIRED FUNCTIONAL MOBILITY, BALANCE, GAIT, AND ENDURANCE: ICD-10-CM

## 2023-05-03 DIAGNOSIS — M25.561 CHRONIC PAIN OF RIGHT KNEE: Primary | ICD-10-CM

## 2023-05-03 NOTE — PROGRESS NOTES
Reassessment  [facility] Physical Therapy [facility]  [address], [address]  [place] [address]  Patient: Jody Simmons   : 1945  Manolo Reddy MD  Date of Initial Visit: Type: THERAPY  Noted: 2023  Today's Date: 5/3/2023  Patient seen for 9 sessions    Treatment has included: therapeutic exercise, neuromuscular re-education, manual therapy, therapeutic activity and kinesiotaping    Subjective   Jody states that we are slowly making improvement.  She states that the balance has improved as her knee is feeling more stable.  She states that she is stronger and is having fewer episodes of the knee giving out on her.  States that she is able to be up on her legs longer now than she could prior to therapy.  She states that she is still not able to climb steps as desired.  Her pain is less intense but still limiting her activities.  She feels better since getting the new orthotics and shoes.     Objective  - she presents with a symmetrical gait pattern  Right knee AROM - 13* - 120*  Right knee Strength - quads 4/5, hamstrings 4/5  Sensation - intact  Palpation - tenderness in anterior medial joint line  Special tests - LEFS Score of 36/80 (was 28 on initial eval)  Activity tolerances - she is able to be up on her feet for 30+ minutes without increased symptoms.      Assessment/Plan  Patient has demonstrated moderate improvement since the initiation of therapy.  The pain has decreased in both intensity and frequency.  The motion has increased slightly.  The activity tolerances have increased but not to desired levels.  I feel that the patient would benefit from continued therapy.  All STG met except for STG 2.  Progressing towards LTG          PT Signature: Ayesha Horowitz, PT  PT License #[id number]    Electronically signed by Ayesha Horowitz, PT, 23, 1:58 PM EDT    Manual Therapy:    10     mins  86394;  Therapeutic Exercise:    20/30     mins  53139;     Neuromuscular Luis A:    0     mins  42582;    Therapeutic Activity:     10/20     mins  65480;    Ultrasound                     0 _  mins 14627  Iontophoresis              ______  Electrical Stimulation  ______mins 45976    Timed Treatment:   40   mins   Total Treatment:     60   mins

## 2023-05-07 NOTE — PROGRESS NOTES
Physical Therapy Daily Treatment Note    Visit Diagnoses:    ICD-10-CM ICD-9-CM   1. Chronic pain of right knee  M25.561 719.46    G89.29 338.29   2. Swelling of right lower extremity  M79.89 729.81   3. Impaired functional mobility, balance, gait, and endurance  Z74.09 V49.89       VISIT#: 10      Jody Simmons reports: she got through the [employer] breakfast really well. There were a lot of brick sidewalks and did really well. Sunday she had some R low back pain but it is gone now. She can get her RUE almost across her L knee but she still needs to use her UEs for help. When she first gets out of bed it sometimes feels like her knee is going to give out. It does not happen every day. She likes the KT tape and it seems to help  Current Pain Level:    0/10; Worst:   9/10 last week ; Best:  0/10  Affected Area: medial R knee  Quality of Pain: achy, intermittently sharp with hyperextension  Functional Deficits/Irritating Factors: steps, slopes, prolonged walking, crossing her legs (picking up her leg)  Progression: improving  Compliance with HEP Reported: Yes     Objective  Presents: edema present,  Increased sets/reps of:  none   Increased resistance on:  none  Added to Program: none    AROM R knee extension = lacking 10 deg of extension; flexion =  112 deg  Tender along medial knee and quads during DTM  Therapeutic Activity: KT tape to R knee for medial joint line unloading, patella stabilization x 5 min       See Exercise, Manual, and Modality Logs for complete treatment.     Patient Education: Pt was educated on exercise biomechanical correctness, intensity, and speed.     Assessment:  Pt reporting improvements in her knee as she sees some of her functional activities getting easier and with less pain. Her extension range of motion improved by 4 deg today since last session.  Pt will continue to benefit from skilled PT interventions to address current functional deficits and impairments.       Plan: Progress  to/Continue with current program.       Timed:  Manual Therapy:            12_    mins  35294;  Ultrasound:                     0      mins  89819;   Therapeutic Exercise:    10     mins  58261;     Neuromuscular Luis A:   0     mins  55484;    Therapeutic Activity:      10     mins  03632;      Iontophoresis              _0__   mins  Dry Needling               _0____   mins         Untimed:  Work Conditioning: __0__ mins 74070  Electrical Stimulation:    0    mins  64410 ( );  Mechanical Traction:       0        mins  04099;   Paraffin                       __0___  mins   Ice/Heat: __0__ mins      Timed Treatment:   32   mins   Total Treatment:     32   mins          Qing Vázquez PTA  [place] License # [id number]  Physical Therapist Assistant

## 2023-05-08 ENCOUNTER — TREATMENT (OUTPATIENT)
Dept: PHYSICAL THERAPY | Facility: CLINIC | Age: 78
End: 2023-05-08
Payer: MEDICARE

## 2023-05-08 DIAGNOSIS — M25.561 CHRONIC PAIN OF RIGHT KNEE: Primary | ICD-10-CM

## 2023-05-08 DIAGNOSIS — Z74.09 IMPAIRED FUNCTIONAL MOBILITY, BALANCE, GAIT, AND ENDURANCE: ICD-10-CM

## 2023-05-08 DIAGNOSIS — G89.29 CHRONIC PAIN OF RIGHT KNEE: Primary | ICD-10-CM

## 2023-05-08 DIAGNOSIS — M79.89 SWELLING OF RIGHT LOWER EXTREMITY: ICD-10-CM

## 2023-05-11 NOTE — PROGRESS NOTES
Physical Therapy Daily Treatment Note    Visit Diagnoses:    ICD-10-CM ICD-9-CM   1. Chronic pain of right knee  M25.561 719.46    G89.29 338.29   2. Swelling of right lower extremity  M79.89 729.81   3. Impaired functional mobility, balance, gait, and endurance  Z74.09 V49.89   4. Right knee pain, unspecified chronicity  M25.561 719.46       VISIT#: 11      Jody Simmons reports: She had company over on Wednesday and was up on her feet for hours. Her knee is more swollen today. She rested it yesterday but it is still swollen. She does state overall she is getting better and how much the tape helps her knee be more functional.   Affected Area: medial R knee, edema throughout  Quality of Pain: achy, intermittently sharp with hyperextension  Functional Deficits/Irritating Factors: steps, slopes, prolonged walking, crossing her legs (picking up her leg)  Progression: improving  Compliance with HEP Reported: Yes     Objective  Presents: edema present,  Increased sets/reps of:  hip abduction  Increased resistance on:  none  Added to Program: none    AROM R knee extension =13 lacking  deg of extension; flexion =   113 deg  Tender along medial knee and quads during DTM  Therapeutic Activity: KT tape to R knee for medial joint line unloading, patella stabilization x 5 min       See Exercise, Manual, and Modality Logs for complete treatment.     Patient Education: Pt was educated on exercise biomechanical correctness, intensity, and speed.     Assessment:  Pt spent a prolonged amount of time on her feet on Wednesday without rest or icing. Her knee presented more swollen today. Her range of motion was a little decreased in both flexion and extension today. She also had more difficulty with step ups.   Pt will continue to benefit from skilled PT interventions to address current functional deficits and impairments.       Plan: Progress to/Continue with current program.       Timed:  Manual Therapy:            15_    mins   37498;  Ultrasound:                     0      mins  86312;   Therapeutic Exercise:    25     mins  72383;     Neuromuscular Luis A:   0     mins  70329;    Therapeutic Activity:      0     mins  51008;      Iontophoresis              _0__   mins  Dry Needling               _0____   mins         Untimed:  Work Conditioning: __0__ mins 87865  Electrical Stimulation:    0    mins  24437 ( );  Mechanical Traction:       0        mins  94844;   Paraffin                       __0___  mins   Ice/Heat: __15__ mins      Timed Treatment:   40   mins   Total Treatment:     55   mins          Qing Vázquez PTA  [place] License # [id number]  Physical Therapist Assistant

## 2023-05-12 ENCOUNTER — TREATMENT (OUTPATIENT)
Dept: PHYSICAL THERAPY | Facility: CLINIC | Age: 78
End: 2023-05-12
Payer: MEDICARE

## 2023-05-12 DIAGNOSIS — G89.29 CHRONIC PAIN OF RIGHT KNEE: Primary | ICD-10-CM

## 2023-05-12 DIAGNOSIS — Z74.09 IMPAIRED FUNCTIONAL MOBILITY, BALANCE, GAIT, AND ENDURANCE: ICD-10-CM

## 2023-05-12 DIAGNOSIS — M25.561 CHRONIC PAIN OF RIGHT KNEE: Primary | ICD-10-CM

## 2023-05-12 DIAGNOSIS — M79.89 SWELLING OF RIGHT LOWER EXTREMITY: ICD-10-CM

## 2023-05-12 DIAGNOSIS — M25.561 RIGHT KNEE PAIN, UNSPECIFIED CHRONICITY: ICD-10-CM

## 2023-05-12 PROCEDURE — 97140 MANUAL THERAPY 1/> REGIONS: CPT | Performed by: PHYSICAL THERAPIST

## 2023-05-12 PROCEDURE — 97110 THERAPEUTIC EXERCISES: CPT | Performed by: PHYSICAL THERAPIST

## 2023-05-18 NOTE — PROGRESS NOTES
Physical Therapy Daily Treatment Note    Visit Diagnoses:    ICD-10-CM ICD-9-CM   1. Chronic pain of right knee  M25.561 719.46    G89.29 338.29   2. Swelling of right lower extremity  M79.89 729.81   3. Impaired functional mobility, balance, gait, and endurance  Z74.09 V49.89   4. Right knee pain, unspecified chronicity  M25.561 719.46       VISIT#: 12      Jody Simmons reports: Her biggest problem is getting out of bed in the morning or getting up from a chair, etc. If she puts weight first on her R LE, her knee about gives out - so she has to lead with her LLE. She also has to be careful on turns and on steps. She does not have enough R knee flexion, due to swelling, to cross her leg and put on her shoe. Her balance is better and she is able to ambulate better with improved gait pattern. She does better if she uses a longer stride and extends her knee more. She knows therapy will only do so much and knows a shot of gel or surgery may be in her future.   Affected Area: medial R knee, edema throughout  Quality of Pain: achy, intermittently sharp with hyperextension  Functional Deficits/Irritating Factors: steps, slopes, prolonged walking, crossing her legs (picking up her leg)  Progression: improving  Compliance with HEP Reported: Yes     Objective  Presents: edema present  Increased sets/reps of:  none   Increased resistance on:  none  Added to Program: SL Clams, Sidestepping    AROM R knee extension = 14 lacking deg; flexion =   103 deg  Tender along distal quad and lateral knee        See Exercise, Manual, and Modality Logs for complete treatment.     Patient Education: Pt was educated on exercise biomechanical correctness, intensity, and speed.     Assessment:  Pt has lost some flexion and extension range of motion since last week when she was up on her feet for hours. Her edema has increased since then and has affected her range of motion. She declined taping today since her skin has been getting red. She was  able to complete her exercise flow sheet without any issues except more difficulty when stepping down.  Pt will continue to benefit from skilled PT interventions to address current functional deficits and impairments.       Plan: Progress to/Continue with current program.       Timed:  Manual Therapy:            15_    mins  32077;  Ultrasound:                     0      mins  69016;   Therapeutic Exercise:    20     mins  25160;     Neuromuscular Luis A:   0     mins  65375;    Therapeutic Activity:      0     mins  00971;      Iontophoresis              _0__   mins  Dry Needling               _0____   mins         Untimed:  Work Conditioning: __0__ mins 39100  Electrical Stimulation:    0    mins  32862 ( );  Mechanical Traction:       0        mins  94970;   Paraffin                       __0___  mins   Ice/Heat: __0__ mins      Timed Treatment:   35   mins   Total Treatment:     35   mins          Qing Vázquez PTA  [place] License # [id number]  Physical Therapist Assistant

## 2023-05-19 ENCOUNTER — TREATMENT (OUTPATIENT)
Dept: PHYSICAL THERAPY | Facility: CLINIC | Age: 78
End: 2023-05-19
Payer: MEDICARE

## 2023-05-19 DIAGNOSIS — M79.89 SWELLING OF RIGHT LOWER EXTREMITY: ICD-10-CM

## 2023-05-19 DIAGNOSIS — M25.561 CHRONIC PAIN OF RIGHT KNEE: Primary | ICD-10-CM

## 2023-05-19 DIAGNOSIS — Z74.09 IMPAIRED FUNCTIONAL MOBILITY, BALANCE, GAIT, AND ENDURANCE: ICD-10-CM

## 2023-05-19 DIAGNOSIS — M25.561 RIGHT KNEE PAIN, UNSPECIFIED CHRONICITY: ICD-10-CM

## 2023-05-19 DIAGNOSIS — G89.29 CHRONIC PAIN OF RIGHT KNEE: Primary | ICD-10-CM

## 2023-05-19 PROCEDURE — 97140 MANUAL THERAPY 1/> REGIONS: CPT | Performed by: PHYSICAL THERAPIST

## 2023-05-19 PROCEDURE — 97110 THERAPEUTIC EXERCISES: CPT | Performed by: PHYSICAL THERAPIST

## 2023-05-21 NOTE — PROGRESS NOTES
Physical Therapy Daily Treatment Note    Visit Diagnoses:    ICD-10-CM ICD-9-CM   1. Chronic pain of right knee  M25.561 719.46    G89.29 338.29   2. Swelling of right lower extremity  M79.89 729.81   3. Impaired functional mobility, balance, gait, and endurance  Z74.09 V49.89   4. Right knee pain, unspecified chronicity  M25.561 719.46       VISIT#: 13      Jody Simmons reports: Overall, she is getting better but she still needs to get stronger and her balance needs work.    Affected Area: medial R knee, edema throughout  Quality of Pain: achy, intermittently sharp with hyperextension  Functional Deficits/Irritating Factors: steps, slopes, prolonged walking, crossing her legs (picking up her leg)  Progression: improving  Compliance with HEP Reported: Yes     Objective  Presents: edema present, mild redness around knee and anterior lower leg almost to her ankle.   Increased sets/reps of:  Partial squats, Seated HS Curls   Increased resistance on:  none  Added to Program: none    AROM R knee extension = lacking 8 deg; Flexion = 116 deg      See Exercise, Manual, and Modality Logs for complete treatment.     Patient Education: Pt was educated on exercise biomechanical correctness, intensity, and speed.     Assessment:  Pt doing very well. She has gained range of motion in both flexion and extension since her last PT visit. Did well with all of her exercises.  Pt will continue to benefit from skilled PT interventions to address current functional deficits and impairments.       Plan: Progress to/Continue with current program.       Timed:  Manual Therapy:            15_    mins  96833;  Ultrasound:                     0      mins  64152;   Therapeutic Exercise:    30     mins  63864;     Neuromuscular Luis A:   0     mins  06084;    Therapeutic Activity:      15     mins  53948;      Iontophoresis              _0__   mins  Dry Needling               _0____   mins         Untimed:  Work Conditioning: __0__ mins  95484  Electrical Stimulation:    0    mins  01078 ( );  Mechanical Traction:       0        mins  94242;   Paraffin                       __0___  mins   Ice/Heat: __15__ mins      Timed Treatment:   60   mins   Total Treatment:     75   mins          LACIE Isaac License # [id number]  Physical Therapist Assistant

## 2023-05-22 ENCOUNTER — TREATMENT (OUTPATIENT)
Dept: PHYSICAL THERAPY | Facility: CLINIC | Age: 78
End: 2023-05-22
Payer: MEDICARE

## 2023-05-22 DIAGNOSIS — Z74.09 IMPAIRED FUNCTIONAL MOBILITY, BALANCE, GAIT, AND ENDURANCE: ICD-10-CM

## 2023-05-22 DIAGNOSIS — M25.561 RIGHT KNEE PAIN, UNSPECIFIED CHRONICITY: ICD-10-CM

## 2023-05-22 DIAGNOSIS — M25.561 CHRONIC PAIN OF RIGHT KNEE: Primary | ICD-10-CM

## 2023-05-22 DIAGNOSIS — M79.89 SWELLING OF RIGHT LOWER EXTREMITY: ICD-10-CM

## 2023-05-22 DIAGNOSIS — G89.29 CHRONIC PAIN OF RIGHT KNEE: Primary | ICD-10-CM

## 2023-05-25 ENCOUNTER — TREATMENT (OUTPATIENT)
Dept: PHYSICAL THERAPY | Facility: CLINIC | Age: 78
End: 2023-05-25
Payer: MEDICARE

## 2023-05-25 DIAGNOSIS — M79.89 SWELLING OF RIGHT LOWER EXTREMITY: ICD-10-CM

## 2023-05-25 DIAGNOSIS — Z74.09 IMPAIRED FUNCTIONAL MOBILITY, BALANCE, GAIT, AND ENDURANCE: ICD-10-CM

## 2023-05-25 DIAGNOSIS — M25.561 CHRONIC PAIN OF RIGHT KNEE: Primary | ICD-10-CM

## 2023-05-25 DIAGNOSIS — G89.29 CHRONIC PAIN OF RIGHT KNEE: Primary | ICD-10-CM

## 2023-05-25 NOTE — PROGRESS NOTES
Physical Therapy Daily Treatment Note  [facility]  [address], [address]  [place] [address]    Patient: Jody Simmons   : 1945  Referring practitioner: Manolo Reddy MD  Today's Date: 2023  Patient seen for 14 sessions    Visit Diagnoses:    ICD-10-CM ICD-9-CM   1. Chronic pain of right knee  M25.561 719.46    G89.29 338.29   2. Swelling of right lower extremity  M79.89 729.81   3. Impaired functional mobility, balance, gait, and endurance  Z74.09 V49.89       Subjective   Jody Simmons reports: that she has been on her feet quite a bit this weekend and wonders if that may be causing some of the swelling.  States that her balance is better when standing.  States that she still does have some weakness in the knee.        Objective   Walks with a symmetrical gait pattern    Knee AROM - 10 - 112*    Slight increased tissue temperature in the right knee, slight swelling    See Exercise, Manual, and Modality Logs for complete treatment.     Patient Education: Pay attention to activities at home to see how much improvement she truly notes compared to this time last month    Assessment/Plan  Progress with gait has continued to improve but slowly.  Still having pain in the knee and tenderness in medial joint line. Will reassess next visit to determine whether objective numbers and subjective statements indicate benefit of further therapy.    Progress strengthening /stabilization /functional activity  Reassess next visit         Timed:  Manual Therapy:    14     mins  46185;  Therapeutic Exercise:    15/30     mins  73781;     Neuromuscular Luis A:    0    mins  08063;    Therapeutic Activity:     10     mins  10196;     Ultrasound:      0     mins  04158;    Iontophoresis              __0_   mins  Dry Needling               _____   mins        Untimed:  Electrical Stimulation:     0    mins  84440 ( );  Mechanical Traction:             mins  36514;    Paraffin                       _____  mins     Timed Treatment:   39   mins   Total Treatment:     70   mins    Ayesha Horowitz, PT  [place] License # [id number]  Physical Therapist    Electronically signed by Ayesha Horowitz PT, 05/25/23, 2:00 PM EDT

## 2023-05-31 ENCOUNTER — TREATMENT (OUTPATIENT)
Dept: PHYSICAL THERAPY | Facility: CLINIC | Age: 78
End: 2023-05-31

## 2023-05-31 DIAGNOSIS — M25.561 CHRONIC PAIN OF RIGHT KNEE: Primary | ICD-10-CM

## 2023-05-31 DIAGNOSIS — M79.89 SWELLING OF RIGHT LOWER EXTREMITY: ICD-10-CM

## 2023-05-31 DIAGNOSIS — M79.604 RIGHT LEG PAIN: ICD-10-CM

## 2023-05-31 DIAGNOSIS — M25.561 RIGHT KNEE PAIN, UNSPECIFIED CHRONICITY: ICD-10-CM

## 2023-05-31 DIAGNOSIS — Z74.09 IMPAIRED FUNCTIONAL MOBILITY, BALANCE, GAIT, AND ENDURANCE: ICD-10-CM

## 2023-05-31 DIAGNOSIS — G89.29 CHRONIC PAIN OF RIGHT KNEE: Primary | ICD-10-CM

## 2023-05-31 PROCEDURE — 97110 THERAPEUTIC EXERCISES: CPT | Performed by: PHYSICAL THERAPIST

## 2023-05-31 NOTE — PROGRESS NOTES
Physical Therapy Daily Treatment Note    [facility]  [address] [place] [address]      Patient: Jody Simmons   : 1945  Referring practitioner: Manolo Reddy MD  Date of Initial Visit: Type: THERAPY  Noted: 2023  Today's Date: 2023  Patient seen for 15 sessions       Visit Diagnoses:    ICD-10-CM ICD-9-CM   1. Chronic pain of right knee  M25.561 719.46    G89.29 338.29   2. Swelling of right lower extremity  M79.89 729.81   3. Impaired functional mobility, balance, gait, and endurance  Z74.09 V49.89   4. Right knee pain, unspecified chronicity  M25.561 719.46   5. Right leg pain  M79.604 729.5   6. Right-sided low back pain without sciatica, unspecified chronicity  M54.50 724.2       Subjective Evaluation    History of Present Illness    Subjective comment: feeling better and hopes to dc in 3 weeks and go down to once a week. Also loves KT tape but feels she would do better with a knee sleeve as she doesn't trust her taping skills.        Objective   See Exercise, Manual, and Modality Logs for complete treatment.       Assessment & Plan     Assessment    Assessment details: Pt wants to wean off PT going from 2x to just once a week.   Did well today with program and no issue with step up training. Good ankle stability with new shoes she's wearing.   Given info on patellar stabilizing type support which might be more tolerable in hotter weather.   P: work on teach back of HEP so pt is 100% ready for dc. [employer] for home?          Timed:         Manual Therapy:   5      mins  61400;     Therapeutic Exercise:    45     mins  99949;     Neuromuscular Luis A:        mins  18217;    Therapeutic Activity:          mins  31318;     Gait Training:           mins  92680;     Ultrasound:          mins  92892;    Ionto                                   mins   67990  Self Care                            mins   68871  Canalith Repos         mins 98180  Work hardening   __   min  93221/23882      Un-Timed:  Electrical Stimulation:         mins  80094 ( );  Dry Needling          mins self-pay  Traction          mins 01322      Timed Treatment:   50   mins   Total Treatment:     60   mins    Zorre Zeno Kimura, PT  [place] License: [id number]    [place] License:  [id number]

## 2023-06-07 ENCOUNTER — TREATMENT (OUTPATIENT)
Dept: PHYSICAL THERAPY | Facility: CLINIC | Age: 78
End: 2023-06-07
Payer: MEDICARE

## 2023-06-07 DIAGNOSIS — M25.561 CHRONIC PAIN OF RIGHT KNEE: Primary | ICD-10-CM

## 2023-06-07 DIAGNOSIS — M79.89 SWELLING OF RIGHT LOWER EXTREMITY: ICD-10-CM

## 2023-06-07 DIAGNOSIS — G89.29 CHRONIC PAIN OF RIGHT KNEE: Primary | ICD-10-CM

## 2023-06-07 DIAGNOSIS — Z74.09 IMPAIRED FUNCTIONAL MOBILITY, BALANCE, GAIT, AND ENDURANCE: ICD-10-CM

## 2023-06-07 DIAGNOSIS — M25.561 RIGHT KNEE PAIN, UNSPECIFIED CHRONICITY: ICD-10-CM

## 2023-06-07 PROCEDURE — 97112 NEUROMUSCULAR REEDUCATION: CPT | Performed by: PHYSICAL THERAPIST

## 2023-06-07 PROCEDURE — 97530 THERAPEUTIC ACTIVITIES: CPT | Performed by: PHYSICAL THERAPIST

## 2023-06-07 PROCEDURE — 97110 THERAPEUTIC EXERCISES: CPT | Performed by: PHYSICAL THERAPIST

## 2023-06-07 NOTE — PROGRESS NOTES
Physical Therapy Daily Treatment Note - Reassessment  [facility]  [address], [address]  [place] [address]    Patient: Jody Simmons   : 1945  Referring practitioner: Manolo Reddy MD  Today's Date: 2023  Patient seen for 16 sessions    Visit Diagnoses:    ICD-10-CM ICD-9-CM   1. Chronic pain of right knee  M25.561 719.46    G89.29 338.29   2. Swelling of right lower extremity  M79.89 729.81   3. Impaired functional mobility, balance, gait, and endurance  Z74.09 V49.89   4. Right knee pain, unspecified chronicity  M25.561 719.46       Subjective   Jody Simmons reports: that she is wanting to wean from therapy by coming once per week for three weeks and to obtain some additional ball exercises.  States that since she has started therapy she feels much stronger.  Is more easily able to get up/down out of a chair, notes that steps are easier to manage and her balance has improved.        Objective   Presents with symmetrical gait pattern with some slight swelling in the right knee    Heel slide *    Tenderness to medial joint line    See Exercise, Manual, and Modality Logs for complete treatment.     Patient Education: lengthy discussion about contiinuing her HEP and possible injection before she goes to [place] this fall    Assessment/Plan  Patient has demonstrated moderate  improvement since the initiation of therapy.  The pain has  decreased.  The motion has increased.  The activity tolerances have increased but not quite to desired levels. I feel that the patient would benefit from a few additional sessions of therapy working on progressing her HEP.  STGs all met, LTG 1,2 met.  Progressing towards other goals.    Progress strengthening /stabilization /functional activity           Timed:  Manual Therapy:    5     mins  60128;  Therapeutic Exercise:    25     mins  21615;     Neuromuscular Luis A:    10    mins  06670;    Therapeutic Activity:      10     mins  11914;     Ultrasound:      0     mins  95600;    Iontophoresis              __0_   mins  Dry Needling               _____   mins        Untimed:  Electrical Stimulation:     0    mins  11216 ( );  Mechanical Traction:             mins  57478;   Paraffin                       _____  mins     Timed Treatment:   50   mins   Total Treatment:     65   mins    Ayesha Horowitz PT  [place] License # [id number]  Physical Therapist    Electronically signed by Ayesha Horowitz PT, 06/07/23, 2:29 PM EDT

## 2023-06-13 ENCOUNTER — TREATMENT (OUTPATIENT)
Dept: PHYSICAL THERAPY | Facility: CLINIC | Age: 78
End: 2023-06-13
Payer: MEDICARE

## 2023-06-13 DIAGNOSIS — Z74.09 IMPAIRED FUNCTIONAL MOBILITY, BALANCE, GAIT, AND ENDURANCE: ICD-10-CM

## 2023-06-13 DIAGNOSIS — M25.561 CHRONIC PAIN OF RIGHT KNEE: Primary | ICD-10-CM

## 2023-06-13 DIAGNOSIS — G89.29 CHRONIC PAIN OF RIGHT KNEE: Primary | ICD-10-CM

## 2023-06-13 DIAGNOSIS — M79.89 SWELLING OF RIGHT LOWER EXTREMITY: ICD-10-CM

## 2023-06-13 PROCEDURE — 97530 THERAPEUTIC ACTIVITIES: CPT | Performed by: PHYSICAL THERAPIST

## 2023-06-13 PROCEDURE — 97110 THERAPEUTIC EXERCISES: CPT | Performed by: PHYSICAL THERAPIST

## 2023-06-13 NOTE — PROGRESS NOTES
Physical Therapy Daily Treatment Note  [facility] Physical Therapy [facility]  [address], [address]  [place] [address]    Patient: Jody Simmons   : 1945  Referring practitioner: Manolo Reddy MD  Today's Date: 2023  Patient seen for 17 sessions    Visit Diagnoses:    ICD-10-CM ICD-9-CM   1. Chronic pain of right knee  M25.561 719.46    G89.29 338.29   2. Swelling of right lower extremity  M79.89 729.81   3. Impaired functional mobility, balance, gait, and endurance  Z74.09 V49.89       Subjective   Jody Simmons reports: that she has obtained a knee brace for patellar stabilization.  Also did get the SB for her exercise program.  Reports that she was more sore after her last session but that soreness has resolved.  Had some pain with pivoting towards the left.  Knee felt like it wanted to give out on her.  Notes that her balance has improved and is less achy.        Objective   Fit her with various knee braces to determine which gives her the most support without compression on the patella    See Exercise, Manual, and Modality Logs for complete treatment.     Patient Education: wearing of knee braces vs compression stockings.  Tried on braces and adjusted fit    Assessment/Plan  Patient very compliant with her HEP.  Has gotten her stabilization brace as well as her SB for exercise. Less tender in MJL but still having crepitus with knee extension.    Anticipate DC next Visit           Timed:  Manual Therapy:    0     mins  32968;  Therapeutic Exercise:    25/35     mins  67074;     Neuromuscular Luis A:    0    mins  96297;    Therapeutic Activity:     15     mins  95011;     Ultrasound:      0     mins  62157;    Iontophoresis              __0_   mins  Dry Needling               _____   mins        Untimed:  Electrical Stimulation:     0    mins  76373 (MC );  Mechanical Traction:             mins  21912;   Paraffin                       _____  mins     Timed Treatment:    40   mins   Total Treatment:     70   mins    Ayesha Horowitz, PT  [place] License # [id number]  Physical Therapist    Electronically signed by Ayesha Horowitz, PT, 06/13/23, 3:27 PM EDT

## 2023-07-21 PROBLEM — R74.8 ELEVATED ALKALINE PHOSPHATASE IN NEWBORN: Status: ACTIVE | Noted: 2023-07-21

## 2023-08-07 ENCOUNTER — OFFICE VISIT (OUTPATIENT)
Dept: ORTHOPEDIC SURGERY | Facility: CLINIC | Age: 78
End: 2023-08-07
Payer: MEDICARE

## 2023-08-07 VITALS — HEIGHT: 60 IN | WEIGHT: 160 LBS | BODY MASS INDEX: 31.41 KG/M2 | TEMPERATURE: 98.3 F

## 2023-08-07 DIAGNOSIS — S90.211A SUBUNGUAL HEMATOMA OF GREAT TOE OF RIGHT FOOT, INITIAL ENCOUNTER: ICD-10-CM

## 2023-08-07 DIAGNOSIS — M17.11 PRIMARY OSTEOARTHRITIS OF RIGHT KNEE: ICD-10-CM

## 2023-08-07 DIAGNOSIS — R52 PAIN: Primary | ICD-10-CM

## 2023-08-07 RX ADMIN — METHYLPREDNISOLONE ACETATE 1 ML: 80 INJECTION, SUSPENSION INTRA-ARTICULAR; INTRALESIONAL; INTRAMUSCULAR; SOFT TISSUE at 11:25

## 2023-08-07 RX ADMIN — LIDOCAINE HYDROCHLORIDE 2 ML: 10 INJECTION, SOLUTION EPIDURAL; INFILTRATION; INTRACAUDAL; PERINEURAL at 11:25

## 2023-08-07 NOTE — PROGRESS NOTES
Patient: Jody Simmons  YOB: 1945  Date of Service: 8/7/2023    Chief Complaints: Right knee pain      Subjective:    History of Present Illness: Pt is seen in the office today with complaints of right knee pain I last saw her in September of last year at that point we thought she had some degenerative changes she is getting ready to go on a trip and wanted to get the most improvement that she could.  She got about 5 or 6 months relief from the injection last time I think it is reasonable to go on to do that now.  She is seeing physical therapy and admits that is helped her immensely.  She is also ask about her right great toe it looks like she has an resolving subungual hematoma she wants to know if she should see a podiatrist about this before she goes        Allergies: No Known Allergies    Medications:   Home Medications:  Current Outpatient Medications on File Prior to Visit   Medication Sig    acetaminophen (TYLENOL) 325 MG tablet Take 2 tablets by mouth Every 6 (Six) Hours As Needed for Mild Pain.    atorvastatin (LIPITOR) 10 MG tablet Take 1 tablet by mouth Daily.    azithromycin (Zithromax Z-Alexys) 250 MG tablet Take 2 tablets by mouth on day 1, then 1 tablet daily on days 2-5    Calcium Carbonate-Vitamin D (CALTRATE 600+D PO) Take  by mouth.    Cholecalciferol (VITAMIN D) 1000 UNITS tablet Take 1 tablet by mouth Daily.    Glucosamine-Chondroit-Vit C-Mn (GLUCOSAMINE CHONDR 1500 COMPLX PO)     losartan (COZAAR) 100 MG tablet Take 1 tablet by mouth Daily.    Multiple Vitamin (MULTI VITAMIN DAILY PO) Take 1 tablet by mouth Daily.     No current facility-administered medications on file prior to visit.     Current Medications:  Scheduled Meds:  Continuous Infusions:No current facility-administered medications for this visit.    PRN Meds:.    I have reviewed the patient's medical history in detail and updated the computerized patient record.  Review and summarization of old records  include:    Past Medical History:   Diagnosis Date    Bilateral malignant neoplasm of overlapping sites of breast in female 02/28/2022    Dyslipidemia     Endometrial polyp 04/21/2015    benign     Fibroid     Frozen shoulder 1980s    H/O pelvic mass     Hyperlipidemia     Hypertension yrs ago    Osteopenia     PMB (postmenopausal bleeding)     Primary osteoarthritis of right knee 03/11/2022    Screen for colon cancer 2021    Cologuard: negative result    UTI (urinary tract infection)     Visual impairment Yrs ago    Glasses        Past Surgical History:   Procedure Laterality Date    BREAST BIOPSY Bilateral 02/18/2022    STEROTACTIC BREAST BIOPSY; Left Breast Site #1 Outer Middle bowtie biopsy clip; Left Breast Site #2 12:00 position barbell clip deployed; Right Breast Upper Inner Posterior Bowtie clip deployed;    COLONOSCOPY N/A 2009    HYSTERECTOMY  2015    OOPHORECTOMY      SKIN LESION EXCISION      excision right post. calf (benign for melaoma)        Social History     Occupational History    Occupation: Teacher     Employer: RETIRED   Tobacco Use    Smoking status: Never    Smokeless tobacco: Never   Vaping Use    Vaping Use: Never used   Substance and Sexual Activity    Alcohol use: Yes     Alcohol/week: 1.0 standard drink     Types: 1 Glasses of wine per week     Comment: SOCIAL rare    Drug use: No    Sexual activity: Not Currently     Partners: Male     Birth control/protection: Post-menopausal      Social History     Social History Narrative    Not on file        Family History   Problem Relation Age of Onset    Diabetes Mother     Heart disease Father     Diabetes Brother     Cancer Brother         prostate       ROS: 14 point review of systems was performed and was negative except for documented findings in HPI and today's encounter.     Allergies: No Known Allergies  Constitutional:  Denies fever, shaking or chills   Eyes:  Denies change in visual acuity   HENT:  Denies  nasal congestion or sore throat   Respiratory:  Denies cough or shortness of breath   Cardiovascular:  Denies chest pain or severe LE edema   GI:  Denies abdominal pain, nausea, vomiting, bloody stools or diarrhea   Musculoskeletal:  Numbness, tingling, or loss of motor function only as noted above in history of present illness.  : Denies painful urination or hematuria  Integument:  Denies rash, lesion or ulceration   Neurologic:  Denies headache or focal weakness  Endocrine:  Denies lymphadenopathy  Psych:  Denies confusion or change in mental status   Hem:  Denies active bleeding      Physical Exam: 77 y.o. female  Wt Readings from Last 3 Encounters:   08/07/23 72.6 kg (160 lb)   07/21/23 74.4 kg (164 lb)   03/10/23 72.1 kg (159 lb)       Body mass index is 31.25 kg/mý.    Vitals:    08/07/23 1059   Temp: 98.3 øF (36.8 øC)     Vital signs reviewed.   General Appearance:    Alert, cooperative, in no acute distress                    Ortho exam      physical exam the right  knee she has mild effusion no overlying skin changes no lymphedema no lymphadenopathy.  She is sitting in a genu valgum position.  She has -5ø of extension flexion is to 125 she has palpable tenderness laterally more than medially and crepitus with range of motion.  Her calf is soft and nontender   As far as her toe great toe nail goes she does appear to have a resolving subungual hematoma the toenail is a little bit long  X-rays AP lateral merchant view of the right knee were taken to evaluate her symptoms and compared x-rays done last year she does have some degenerative changes laterally no significant change      Assessment: Right knee pain I think is degenerative I think the time is good for the injection as far as her toenail goes I told her I would not do anything about it till she gets back in a few weeks from her vacation I would keep the toenail los was not hitting the end of her shoe    Plan: Plan is as above  Follow up as  indicated.  Ice, elevate, and rest as needed.  Discussed conservative measures of pain control including ice, bracing.  Also talked about the importance of strengthening and maintaining ideal body weight    Yael Echeverria M.D.  Large Joint Arthrocentesis: R knee  Date/Time: 8/7/2023 11:25 AM  Consent given by: patient  Site marked: site marked  Timeout: Immediately prior to procedure a time out was called to verify the correct patient, procedure, equipment, support staff and site/side marked as required   Supporting Documentation  Indications: pain, joint swelling and diagnostic evaluation   Procedure Details  Location: knee - R knee  Preparation: Patient was prepped and draped in the usual sterile fashion  Needle gauge: 21G.  Medications administered: 1 mL methylPREDNISolone acetate 80 MG/ML; 2 mL lidocaine PF 1% 1 %  Patient tolerance: patient tolerated the procedure well with no immediate complications

## 2023-08-08 RX ORDER — LIDOCAINE HYDROCHLORIDE 10 MG/ML
2 INJECTION, SOLUTION EPIDURAL; INFILTRATION; INTRACAUDAL; PERINEURAL
Status: COMPLETED | OUTPATIENT
Start: 2023-08-07 | End: 2023-08-07

## 2023-08-08 RX ORDER — METHYLPREDNISOLONE ACETATE 80 MG/ML
1 INJECTION, SUSPENSION INTRA-ARTICULAR; INTRALESIONAL; INTRAMUSCULAR; SOFT TISSUE
Status: COMPLETED | OUTPATIENT
Start: 2023-08-07 | End: 2023-08-07

## 2023-10-26 DIAGNOSIS — Z78.0 POST-MENOPAUSAL: ICD-10-CM

## 2023-10-26 DIAGNOSIS — M17.11 PRIMARY OSTEOARTHRITIS OF RIGHT KNEE: Primary | ICD-10-CM

## 2023-11-15 DIAGNOSIS — I10 ESSENTIAL HYPERTENSION: ICD-10-CM

## 2023-11-15 DIAGNOSIS — E55.9 MILD VITAMIN D DEFICIENCY: ICD-10-CM

## 2023-11-15 DIAGNOSIS — E78.2 MIXED HYPERLIPIDEMIA: Primary | ICD-10-CM

## 2023-11-15 DIAGNOSIS — R73.9 HYPERGLYCEMIA: ICD-10-CM

## 2023-11-16 LAB
25(OH)D3+25(OH)D2 SERPL-MCNC: 50.9 NG/ML (ref 30–100)
ALBUMIN SERPL-MCNC: 4.5 G/DL (ref 3.5–5.2)
ALBUMIN/GLOB SERPL: 2.6 G/DL
ALP SERPL-CCNC: 131 U/L (ref 39–117)
ALT SERPL-CCNC: 27 U/L (ref 1–33)
AST SERPL-CCNC: 23 U/L (ref 1–32)
BASOPHILS # BLD AUTO: 0.03 10*3/MM3 (ref 0–0.2)
BASOPHILS NFR BLD AUTO: 0.6 % (ref 0–1.5)
BILIRUB SERPL-MCNC: 0.5 MG/DL (ref 0–1.2)
BUN SERPL-MCNC: 12 MG/DL (ref 8–23)
BUN/CREAT SERPL: 15.2 (ref 7–25)
CALCIUM SERPL-MCNC: 9.5 MG/DL (ref 8.6–10.5)
CHLORIDE SERPL-SCNC: 105 MMOL/L (ref 98–107)
CHOLEST SERPL-MCNC: 158 MG/DL (ref 0–200)
CHOLEST/HDLC SERPL: 3.59 {RATIO}
CO2 SERPL-SCNC: 29.6 MMOL/L (ref 22–29)
CREAT SERPL-MCNC: 0.79 MG/DL (ref 0.57–1)
EGFRCR SERPLBLD CKD-EPI 2021: 77.6 ML/MIN/1.73
EOSINOPHIL # BLD AUTO: 0.11 10*3/MM3 (ref 0–0.4)
EOSINOPHIL NFR BLD AUTO: 2.3 % (ref 0.3–6.2)
ERYTHROCYTE [DISTWIDTH] IN BLOOD BY AUTOMATED COUNT: 12.5 % (ref 12.3–15.4)
GLOBULIN SER CALC-MCNC: 1.7 GM/DL
GLUCOSE SERPL-MCNC: 120 MG/DL (ref 65–99)
HBA1C MFR BLD: 5.9 % (ref 4.8–5.6)
HCT VFR BLD AUTO: 43.5 % (ref 34–46.6)
HDLC SERPL-MCNC: 44 MG/DL (ref 40–60)
HGB BLD-MCNC: 14.9 G/DL (ref 12–15.9)
IMM GRANULOCYTES # BLD AUTO: 0.01 10*3/MM3 (ref 0–0.05)
IMM GRANULOCYTES NFR BLD AUTO: 0.2 % (ref 0–0.5)
LDLC SERPL CALC-MCNC: 97 MG/DL (ref 0–100)
LYMPHOCYTES # BLD AUTO: 1.08 10*3/MM3 (ref 0.7–3.1)
LYMPHOCYTES NFR BLD AUTO: 22.9 % (ref 19.6–45.3)
MCH RBC QN AUTO: 33.9 PG (ref 26.6–33)
MCHC RBC AUTO-ENTMCNC: 34.3 G/DL (ref 31.5–35.7)
MCV RBC AUTO: 99.1 FL (ref 79–97)
MONOCYTES # BLD AUTO: 0.63 10*3/MM3 (ref 0.1–0.9)
MONOCYTES NFR BLD AUTO: 13.3 % (ref 5–12)
NEUTROPHILS # BLD AUTO: 2.86 10*3/MM3 (ref 1.7–7)
NEUTROPHILS NFR BLD AUTO: 60.7 % (ref 42.7–76)
NRBC BLD AUTO-RTO: 0.2 /100 WBC (ref 0–0.2)
PLATELET # BLD AUTO: 196 10*3/MM3 (ref 140–450)
POTASSIUM SERPL-SCNC: 4.7 MMOL/L (ref 3.5–5.2)
PROT SERPL-MCNC: 6.2 G/DL (ref 6–8.5)
RBC # BLD AUTO: 4.39 10*6/MM3 (ref 3.77–5.28)
SODIUM SERPL-SCNC: 140 MMOL/L (ref 136–145)
TRIGL SERPL-MCNC: 89 MG/DL (ref 0–150)
VLDLC SERPL CALC-MCNC: 17 MG/DL (ref 5–40)
WBC # BLD AUTO: 4.72 10*3/MM3 (ref 3.4–10.8)

## 2023-11-29 ENCOUNTER — OFFICE VISIT (OUTPATIENT)
Dept: FAMILY MEDICINE CLINIC | Facility: CLINIC | Age: 78
End: 2023-11-29
Payer: MEDICARE

## 2023-11-29 VITALS
BODY MASS INDEX: 32.85 KG/M2 | RESPIRATION RATE: 16 BRPM | DIASTOLIC BLOOD PRESSURE: 62 MMHG | HEIGHT: 60 IN | WEIGHT: 167.3 LBS | TEMPERATURE: 98 F | HEART RATE: 84 BPM | OXYGEN SATURATION: 99 % | SYSTOLIC BLOOD PRESSURE: 132 MMHG

## 2023-11-29 DIAGNOSIS — I10 ESSENTIAL HYPERTENSION: Primary | ICD-10-CM

## 2023-11-29 DIAGNOSIS — E66.9 OBESITY (BMI 30.0-34.9): ICD-10-CM

## 2023-11-29 DIAGNOSIS — E78.2 MIXED HYPERLIPIDEMIA: ICD-10-CM

## 2023-11-29 DIAGNOSIS — Z00.00 MEDICARE ANNUAL WELLNESS VISIT, SUBSEQUENT: ICD-10-CM

## 2023-11-29 NOTE — PROGRESS NOTES
The ABCs of the Annual Wellness Visit  Subsequent Medicare Wellness Visit    Subjective    Jody Simmons is a 77 y.o. female who presents for a Subsequent Medicare Wellness Visit.    The following portions of the patient's history were reviewed and   updated as appropriate: allergies, current medications, past family history, past medical history, past social history, past surgical history, and problem list.    Compared to one year ago, the patient feels her physical   health is the same.    Compared to one year ago, the patient feels her mental   health is the same.    Recent Hospitalizations:  She was not admitted to the hospital during the last year.       Current Medical Providers:  Patient Care Team:  Manolo Reddy MD as PCP - General (Internal Medicine)  Manolo Reddy MD as Referring Physician (Internal Medicine)  Carlos Travis MD as Consulting Physician (Hematology and Oncology)    Outpatient Medications Prior to Visit   Medication Sig Dispense Refill    acetaminophen (TYLENOL) 325 MG tablet Take 2 tablets by mouth Every 6 (Six) Hours As Needed for Mild Pain.      atorvastatin (LIPITOR) 10 MG tablet Take 1 tablet by mouth Daily. 90 tablet 3    azithromycin (Zithromax Z-Alexys) 250 MG tablet Take 2 tablets by mouth on day 1, then 1 tablet daily on days 2-5 6 tablet 0    Calcium Carbonate-Vitamin D (CALTRATE 600+D PO) Take  by mouth.      Cholecalciferol (VITAMIN D) 1000 UNITS tablet Take 1 tablet by mouth Daily.      Glucosamine-Chondroit-Vit C-Mn (GLUCOSAMINE CHONDR 1500 COMPLX PO)       losartan (COZAAR) 100 MG tablet Take 1 tablet by mouth Daily. 90 tablet 3    Multiple Vitamin (MULTI VITAMIN DAILY PO) Take 1 tablet by mouth Daily.       No facility-administered medications prior to visit.       No opioid medication identified on active medication list. I have reviewed chart for other potential  high risk medication/s and harmful drug interactions in the elderly.        Aspirin is not on active  "medication list.  Aspirin use is indicated based on review of current medical condition/s. Pros and cons of this therapy have been discussed with this patient. Benefits of this medication outweigh potential harm.  Patient has been instructed to start taking this medication..    Patient Active Problem List   Diagnosis    Hyperglycemia    Mixed hyperlipidemia    Osteopenia    Mild vitamin D deficiency    Dysesthesia    Essential hypertension    Chronic left-sided headaches    Dizziness    Balance disorder    Acute otitis media    Cough    Rash    Edema    Serous otitis media    Obesity (BMI 30.0-34.9)    Prediabetes    Ductal carcinoma in situ (DCIS) of right breast    Ductal carcinoma in situ (DCIS) of left breast    Primary osteoarthritis of right knee    Leg edema, right    Medicare annual wellness visit, subsequent    Elevated alkaline phosphatase level     Advance Care Planning   Advance Care Planning     Advance Directive is on file.  ACP discussion was held with the patient during this visit. Patient has an advance directive in EMR which is still valid.      Objective    Vitals:    11/29/23 1316   BP: 132/62   BP Location: Right leg   Patient Position: Sitting   Cuff Size: Adult   Pulse: 84   Resp: 16   Temp: 98 °F (36.7 °C)   TempSrc: Oral   SpO2: 99%   Weight: 75.9 kg (167 lb 4.8 oz)   Height: 152.4 cm (60\")     Estimated body mass index is 32.67 kg/m² as calculated from the following:    Height as of this encounter: 152.4 cm (60\").    Weight as of this encounter: 75.9 kg (167 lb 4.8 oz).           Does the patient have evidence of cognitive impairment? No    Lab Results   Component Value Date    CHLPL 158 11/15/2023    TRIG 89 11/15/2023    HDL 44 11/15/2023    LDL 97 11/15/2023    VLDL 17 11/15/2023    HGBA1C 5.90 (H) 11/15/2023        HEALTH RISK ASSESSMENT    Smoking Status:  Social History     Tobacco Use   Smoking Status Never   Smokeless Tobacco Never     Alcohol Consumption:  Social History "     Substance and Sexual Activity   Alcohol Use Yes    Alcohol/week: 1.0 standard drink of alcohol    Types: 1 Glasses of wine per week    Comment: SOCIAL rare     Fall Risk Screen:    QUANGAMITA Fall Risk Assessment was completed, and patient is at LOW risk for falls.Assessment completed on:2023    Depression Screenin/29/2023     1:22 PM   PHQ-2/PHQ-9 Depression Screening   Little Interest or Pleasure in Doing Things 0-->not at all   Feeling Down, Depressed or Hopeless 0-->not at all   PHQ-9: Brief Depression Severity Measure Score 0       Health Habits and Functional and Cognitive Screenin/29/2023     1:20 PM   Functional & Cognitive Status   Do you have difficulty preparing food and eating? No   Do you have difficulty bathing yourself, getting dressed or grooming yourself? No   Do you have difficulty using the toilet? No   Do you have difficulty moving around from place to place? No   Do you have trouble with steps or getting out of a bed or a chair? No   Current Diet Well Balanced Diet   Dental Exam Up to date   Eye Exam Up to date   Current Exercises Include Gardening;House Cleaning;Walking;Yard Work   Do you need help using the phone?  No   Are you deaf or do you have serious difficulty hearing?  No   Do you need help to go to places out of walking distance? No   Do you need help shopping? No   Do you need help preparing meals?  No   Do you need help with housework?  No   Do you need help with laundry? No   Do you need help taking your medications? No   Do you need help managing money? No   Do you ever drive or ride in a car without wearing a seat belt? No   Have you felt unusual stress, anger or loneliness in the last month? No   Who do you live with? Alone   If you need help, do you have trouble finding someone available to you? No   Have you been bothered in the last four weeks by sexual problems? No   Do you have difficulty concentrating, remembering or making decisions? No        Age-appropriate Screening Schedule:  Refer to the list below for future screening recommendations based on patient's age, sex and/or medical conditions. Orders for these recommended tests are listed in the plan section. The patient has been provided with a written plan.    Health Maintenance   Topic Date Due    DXA SCAN  06/17/2023    COVID-19 Vaccine (4 - 2023-24 season) 09/01/2023    ANNUAL WELLNESS VISIT  11/21/2023    MAMMOGRAM  03/08/2024    BMI FOLLOWUP  03/10/2024    COLORECTAL CANCER SCREENING  04/26/2024    LIPID PANEL  11/15/2024    TDAP/TD VACCINES (2 - Td or Tdap) 04/18/2029    HEPATITIS C SCREENING  Completed    INFLUENZA VACCINE  Completed    Pneumococcal Vaccine 65+  Completed    ZOSTER VACCINE  Completed                  CMS Preventative Services Quick Reference  Risk Factors Identified During Encounter  None Identified  The above risks/problems have been discussed with the patient.  Pertinent information has been shared with the patient in the After Visit Summary.  An After Visit Summary and PPPS were made available to the patient.    Follow Up:   Next Medicare Wellness visit to be scheduled in 1 year.       Additional E&M Note during same encounter follows:  Patient has multiple medical problems which are significant and separately identifiable that require additional work above and beyond the Medicare Wellness Visit.      Chief Complaint  Annual Exam (DISCUSS LABS, PT DID VASCULAR SCREENING, RESULTS SHOULD BE IN CHART, DEXA IS SCHEDULED. /)    Subjective        This pleasant 77-year-old woman is here for a subsequent Medicare annual wellness visit.    Also, she is here to follow-up on hypertension, mixed hyperlipidemia.      Jody Simmons is also being seen today for mixed hyperlipidemia and essential hypertension.    Review of Systems   Constitutional: Negative.    HENT: Negative.     Eyes: Negative.    Respiratory: Negative.     Cardiovascular: Negative.    Genitourinary: Negative.   "  Musculoskeletal: Negative.    Allergic/Immunologic: Negative.    Neurological: Negative.    Psychiatric/Behavioral: Negative.         Objective   Vital Signs:  /62 (BP Location: Right leg, Patient Position: Sitting, Cuff Size: Adult)   Pulse 84   Temp 98 °F (36.7 °C) (Oral)   Resp 16   Ht 152.4 cm (60\")   Wt 75.9 kg (167 lb 4.8 oz)   SpO2 99%   BMI 32.67 kg/m²     Physical Exam  Vitals and nursing note reviewed.   Constitutional:       General: She is not in acute distress.     Appearance: Normal appearance. She is obese. She is not ill-appearing, toxic-appearing or diaphoretic.      Comments: Pleasant, neatly groomed, no distress.  BMI 32.   Neck:      Vascular: No carotid bruit.   Cardiovascular:      Rate and Rhythm: Normal rate and regular rhythm.      Heart sounds: Normal heart sounds. No murmur heard.     No gallop.   Pulmonary:      Effort: No respiratory distress.      Breath sounds: Normal breath sounds. No wheezing or rales.   Neurological:      Mental Status: She is alert and oriented to person, place, and time.   Psychiatric:         Mood and Affect: Mood normal.         Behavior: Behavior normal.         Thought Content: Thought content normal.                         Assessment and Plan   Diagnoses and all orders for this visit:    1. Essential hypertension (Primary)    2. Mixed hyperlipidemia    3. Obesity (BMI 30.0-34.9)    4. Medicare annual wellness visit, subsequent    Other orders  -     Fluzone High-Dose 65+yrs (4186-3231)      We gave her flu shot today.    Her hypertension is well-controlled.    She is obese with a BMI of 32.  Of asked her to do her best to lose weight via regular aerobic activity per American Heart Association guidelines of brisk aerobic activity 30 minutes a day 5 days a week and decrease caloric intake.    She does continue to have elevated fasting glucose was however her hemoglobin A1c is somewhat improved at 5.9% a week prior to today's visit and in July it " was 6.1.    I have asked her to follow-up in about 3 months.  Fasting labs prior to that visit should include: Lipid profile, comprehensive metabolic panel, hemoglobin A1c.  It would be good to get a alkaline phosphatase isoenzyme's on her in light of her mild chronic elevation of alkaline phosphatase.       Follow Up   No follow-ups on file.  Patient was given instructions and counseling regarding her condition or for health maintenance advice. Please see specific information pulled into the AVS if appropriate.

## 2024-01-29 RX ORDER — LOSARTAN POTASSIUM 100 MG/1
100 TABLET ORAL DAILY
Qty: 90 TABLET | Refills: 0 | Status: SHIPPED | OUTPATIENT
Start: 2024-01-29

## 2024-01-29 RX ORDER — ATORVASTATIN CALCIUM 10 MG/1
10 TABLET, FILM COATED ORAL DAILY
Qty: 90 TABLET | Refills: 0 | Status: SHIPPED | OUTPATIENT
Start: 2024-01-29

## 2024-02-01 ENCOUNTER — HOSPITAL ENCOUNTER (OUTPATIENT)
Dept: BONE DENSITY | Facility: HOSPITAL | Age: 79
Discharge: HOME | End: 2024-02-01
Admitting: INTERNAL MEDICINE
Payer: MEDICARE

## 2024-02-01 PROCEDURE — 77080 DXA BONE DENSITY AXIAL: CPT

## 2024-02-29 DIAGNOSIS — E78.2 MIXED HYPERLIPIDEMIA: ICD-10-CM

## 2024-02-29 DIAGNOSIS — R73.9 HYPERGLYCEMIA: ICD-10-CM

## 2024-02-29 DIAGNOSIS — I10 ESSENTIAL HYPERTENSION: Primary | ICD-10-CM

## 2024-03-05 LAB
ALBUMIN SERPL-MCNC: 4.4 G/DL (ref 3.5–5.2)
ALBUMIN/GLOB SERPL: 2.3 G/DL
ALP SERPL-CCNC: 136 U/L (ref 39–117)
ALT SERPL-CCNC: 28 U/L (ref 1–33)
AST SERPL-CCNC: 20 U/L (ref 1–32)
BILIRUB SERPL-MCNC: 0.5 MG/DL (ref 0–1.2)
BUN SERPL-MCNC: 12 MG/DL (ref 8–23)
BUN/CREAT SERPL: 14.5 (ref 7–25)
CALCIUM SERPL-MCNC: 9.2 MG/DL (ref 8.6–10.5)
CHLORIDE SERPL-SCNC: 104 MMOL/L (ref 98–107)
CHOLEST SERPL-MCNC: 170 MG/DL (ref 0–200)
CHOLEST/HDLC SERPL: 3.95 {RATIO}
CO2 SERPL-SCNC: 24.6 MMOL/L (ref 22–29)
CREAT SERPL-MCNC: 0.83 MG/DL (ref 0.57–1)
EGFRCR SERPLBLD CKD-EPI 2021: 72.7 ML/MIN/1.73
GLOBULIN SER CALC-MCNC: 1.9 GM/DL
GLUCOSE SERPL-MCNC: 109 MG/DL (ref 65–99)
HBA1C MFR BLD: 6.1 % (ref 4.8–5.6)
HDLC SERPL-MCNC: 43 MG/DL (ref 40–60)
LDLC SERPL CALC-MCNC: 105 MG/DL (ref 0–100)
POTASSIUM SERPL-SCNC: 4.6 MMOL/L (ref 3.5–5.2)
PROT SERPL-MCNC: 6.3 G/DL (ref 6–8.5)
SODIUM SERPL-SCNC: 141 MMOL/L (ref 136–145)
TRIGL SERPL-MCNC: 125 MG/DL (ref 0–150)
VLDLC SERPL CALC-MCNC: 22 MG/DL (ref 5–40)

## 2024-03-07 ENCOUNTER — OFFICE VISIT (OUTPATIENT)
Dept: FAMILY MEDICINE CLINIC | Facility: CLINIC | Age: 79
End: 2024-03-07
Payer: MEDICARE

## 2024-03-07 VITALS
OXYGEN SATURATION: 97 % | HEART RATE: 83 BPM | DIASTOLIC BLOOD PRESSURE: 68 MMHG | HEIGHT: 60 IN | SYSTOLIC BLOOD PRESSURE: 114 MMHG | BODY MASS INDEX: 32.41 KG/M2 | TEMPERATURE: 97.9 F | WEIGHT: 165.1 LBS

## 2024-03-07 DIAGNOSIS — E78.2 MIXED HYPERLIPIDEMIA: ICD-10-CM

## 2024-03-07 DIAGNOSIS — E66.9 OBESITY (BMI 30.0-34.9): ICD-10-CM

## 2024-03-07 DIAGNOSIS — I10 ESSENTIAL HYPERTENSION: Primary | ICD-10-CM

## 2024-03-07 DIAGNOSIS — R73.03 PREDIABETES: ICD-10-CM

## 2024-03-07 PROCEDURE — 99213 OFFICE O/P EST LOW 20 MIN: CPT | Performed by: INTERNAL MEDICINE

## 2024-03-07 PROCEDURE — 3078F DIAST BP <80 MM HG: CPT | Performed by: INTERNAL MEDICINE

## 2024-03-07 PROCEDURE — 3074F SYST BP LT 130 MM HG: CPT | Performed by: INTERNAL MEDICINE

## 2024-03-07 NOTE — PROGRESS NOTES
Subjective   Jody Simmons is a 78 y.o. female. Patient is here today for   Chief Complaint   Patient presents with    4month follow up    Hypertension    Hyperlipidemia          Vitals:    03/07/24 1315   BP: 114/68   Pulse: 83   Temp: 97.9 °F (36.6 °C)   SpO2: 97%     Body mass index is 32.24 kg/m².      Past Medical History:   Diagnosis Date    Bilateral malignant neoplasm of overlapping sites of breast in female 02/28/2022    Dyslipidemia     Endometrial polyp 04/21/2015    benign     Fibroid     Frozen shoulder 1980s    H/O pelvic mass     Hyperlipidemia     Hypertension yrs ago    Osteopenia     PMB (postmenopausal bleeding)     Primary osteoarthritis of right knee 03/11/2022    Screen for colon cancer 2021    Cologuard: negative result    UTI (urinary tract infection)     Visual impairment Yrs ago    Glasses      No Known Allergies   Social History     Socioeconomic History    Marital status: Single   Tobacco Use    Smoking status: Never    Smokeless tobacco: Never   Vaping Use    Vaping status: Never Used   Substance and Sexual Activity    Alcohol use: Yes     Alcohol/week: 1.0 standard drink of alcohol     Types: 1 Glasses of wine per week     Comment: SOCIAL rare    Drug use: No    Sexual activity: Not Currently     Partners: Male     Birth control/protection: Post-menopausal        Current Outpatient Medications:     acetaminophen (TYLENOL) 325 MG tablet, Take 2 tablets by mouth Every 6 (Six) Hours As Needed for Mild Pain., Disp: , Rfl:     atorvastatin (LIPITOR) 10 MG tablet, Take 1 tablet by mouth Daily., Disp: 90 tablet, Rfl: 0    azithromycin (Zithromax Z-Alexys) 250 MG tablet, Take 2 tablets by mouth on day 1, then 1 tablet daily on days 2-5, Disp: 6 tablet, Rfl: 0    Calcium Carbonate-Vitamin D (CALTRATE 600+D PO), Take  by mouth., Disp: , Rfl:     Cholecalciferol (VITAMIN D) 1000 UNITS tablet, Take 1 tablet by mouth Daily., Disp: , Rfl:     Glucosamine-Chondroit-Vit C-Mn (GLUCOSAMINE CHONDR 1500  COMPLX PO), , Disp: , Rfl:     losartan (COZAAR) 100 MG tablet, Take 1 tablet by mouth Daily., Disp: 90 tablet, Rfl: 0    Multiple Vitamin (MULTI VITAMIN DAILY PO), Take 1 tablet by mouth Daily., Disp: , Rfl:      Objective     History of Present Illness  She is here to follow-up on hypertension and hypercholesterolemia today.    She has no complaints.       Review of Systems   Constitutional: Negative.    HENT: Negative.     Respiratory: Negative.     Cardiovascular: Negative.    Musculoskeletal: Negative.    Psychiatric/Behavioral: Negative.         Physical Exam  Vitals and nursing note reviewed.   Constitutional:       General: She is not in acute distress.     Appearance: Normal appearance. She is obese. She is not ill-appearing, toxic-appearing or diaphoretic.      Comments: Pleasant, neatly groomed, no distress.   Cardiovascular:      Rate and Rhythm: Regular rhythm.      Heart sounds: Normal heart sounds. No murmur heard.     No gallop.   Pulmonary:      Effort: No respiratory distress.      Breath sounds: Normal breath sounds. No wheezing or rales.   Neurological:      Mental Status: She is alert and oriented to person, place, and time.   Psychiatric:         Mood and Affect: Mood normal.         Behavior: Behavior normal.         Thought Content: Thought content normal.           Problems Addressed this Visit          Cardiac and Vasculature    Mixed hyperlipidemia    Essential hypertension - Primary       Endocrine and Metabolic    Obesity (BMI 30.0-34.9)    Prediabetes     Diagnoses         Codes Comments    Essential hypertension    -  Primary ICD-10-CM: I10  ICD-9-CM: 401.9     Mixed hyperlipidemia     ICD-10-CM: E78.2  ICD-9-CM: 272.2     Prediabetes     ICD-10-CM: R73.03  ICD-9-CM: 790.29     Obesity (BMI 30.0-34.9)     ICD-10-CM: E66.9  ICD-9-CM: 278.00               PLAN  Her hypertension is well-controlled.    She has mixed hyperlipidemia.  She is doing relatively well on atorvastatin 10 mg  daily.    She is prediabetic and obese with BMI of 32.  Of encouraged her to do her best to lose weight via regular aerobic activity and decrease caloric intake.    I asked her to follow-up for a Medicare annual wellness visit in about 6 months.  Fasting labs prior to visit should include: Lipid profile, comprehensive metabolic panel, CBC, urinalysis, hemoglobin A1c.  No follow-ups on file.  Answers submitted by the patient for this visit:  Other (Submitted on 2/29/2024)  Please describe your symptoms.: Routine follow-up every 4 months to review labs for blood pressure , & cholesterol.  Have you had these symptoms before?: Yes  How long have you been having these symptoms?: Greater than 2 weeks  Please list any medications you are currently taking for this condition.: Losartan & atorvastatin  Please describe any probable cause for these symptoms. : Age  Primary Reason for Visit (Submitted on 2/29/2024)  What is the primary reason for your visit?: Other

## 2024-03-08 LAB
25(OH)D3+25(OH)D2 SERPL-MCNC: 51.1 NG/ML (ref 30–100)
ALP BONE CFR SERPL: 49 % (ref 14–68)
ALP INTEST CFR SERPL: 2 % (ref 0–18)
ALP LIVER CFR SERPL: 50 % (ref 18–85)
ALP SERPL-CCNC: 141 IU/L (ref 44–121)
WRITTEN AUTHORIZATION: NORMAL

## 2024-04-19 RX ORDER — ATORVASTATIN CALCIUM 10 MG/1
10 TABLET, FILM COATED ORAL DAILY
Qty: 90 TABLET | Refills: 0 | Status: SHIPPED | OUTPATIENT
Start: 2024-04-19

## 2024-04-19 RX ORDER — LOSARTAN POTASSIUM 100 MG/1
100 TABLET ORAL DAILY
Qty: 90 TABLET | Refills: 0 | Status: SHIPPED | OUTPATIENT
Start: 2024-04-19

## 2024-05-03 ENCOUNTER — HOSPITAL ENCOUNTER (OUTPATIENT)
Facility: HOSPITAL | Age: 79
Discharge: HOME | End: 2024-05-03
Attending: EMERGENCY MEDICINE | Admitting: EMERGENCY MEDICINE
Payer: MEDICARE

## 2024-05-03 VITALS
HEIGHT: 60 IN | TEMPERATURE: 97.7 F | OXYGEN SATURATION: 99 % | SYSTOLIC BLOOD PRESSURE: 153 MMHG | BODY MASS INDEX: 32.39 KG/M2 | DIASTOLIC BLOOD PRESSURE: 78 MMHG | HEART RATE: 89 BPM | RESPIRATION RATE: 18 BRPM | WEIGHT: 165 LBS
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
## 2024-05-03 PROCEDURE — G0463 HOSPITAL OUTPT CLINIC VISIT: HCPCS | Performed by: NURSE PRACTITIONER

## 2024-05-03 RX ORDER — DOXYCYCLINE 100 MG/1
100 CAPSULE ORAL 2 TIMES DAILY
Qty: 14 CAPSULE | Refills: 0 | Status: SHIPPED | OUTPATIENT
Start: 2024-05-03

## 2024-05-03 NOTE — FSED PROVIDER NOTE
Subjective   History of Present Illness  Patient is 78-year-old female who presents for possible tick bite to her right labia after weed eating on Monday, Tuesday, Wednesday.  States she had pants on but forgot to spray for DEET.  States she felt a bump yesterday and was not sure what it was.  She denies any fever, chills, rash.      Review of Systems   Skin:  Positive for rash.   All other systems reviewed and are negative.      Past Medical History:   Diagnosis Date    Bilateral malignant neoplasm of overlapping sites of breast in female 02/28/2022    Dyslipidemia     Endometrial polyp 04/21/2015    benign     Fibroid     Frozen shoulder 1980s    H/O pelvic mass     Hyperlipidemia     Hypertension yrs ago    Osteopenia     PMB (postmenopausal bleeding)     Primary osteoarthritis of right knee 03/11/2022    Screen for colon cancer 2021    Cologuard: negative result    UTI (urinary tract infection)     Visual impairment Yrs ago    Glasses       No Known Allergies    Past Surgical History:   Procedure Laterality Date    BREAST BIOPSY Bilateral 02/18/2022    STEROTACTIC BREAST BIOPSY; Left Breast Site #1 Outer Middle bowtie biopsy clip; Left Breast Site #2 12:00 position barbell clip deployed; Right Breast Upper Inner Posterior Bowtie clip deployed;    COLONOSCOPY N/A 2009    HYSTERECTOMY  2015    OOPHORECTOMY      SKIN LESION EXCISION      excision right post. calf (benign for melaoma)       Family History   Problem Relation Age of Onset    Diabetes Mother     Heart disease Father     Diabetes Brother     Cancer Brother         prostate       Social History     Socioeconomic History    Marital status: Single   Tobacco Use    Smoking status: Never    Smokeless tobacco: Never   Vaping Use    Vaping status: Never Used   Substance and Sexual Activity    Alcohol use: Yes     Alcohol/week: 1.0 standard drink of alcohol     Types: 1 Glasses of wine per week     Comment: SOCIAL rare    Drug use: No    Sexual activity: Not  Currently     Partners: Male     Birth control/protection: Post-menopausal           Objective   Physical Exam  Vitals and nursing note reviewed.   Constitutional:       Appearance: Normal appearance.   HENT:      Head: Normocephalic and atraumatic.   Pulmonary:      Effort: Pulmonary effort is normal.   Musculoskeletal:      Cervical back: Normal range of motion and neck supple.   Skin:     General: Skin is warm and dry.      Capillary Refill: Capillary refill takes less than 2 seconds.      Comments: Tick noted to right labia   Neurological:      General: No focal deficit present.      Mental Status: She is alert and oriented to person, place, and time.         Procedures           ED Course                                           Medical Decision Making  Tick was removed using forceps, head was intact.  Will cover with doxycycline.  Patient is otherwise nontoxic and well-appearing.  She was given strict return precautions.    Problems Addressed:  Tick bite, unspecified site, initial encounter: complicated acute illness or injury    Risk  Prescription drug management.        Final diagnoses:   Tick bite, unspecified site, initial encounter       ED Disposition  ED Disposition       ED Disposition   Discharge    Condition   Stable    Comment   --               Manolo Reddy MD  [address] [facility] [address] [phone]    Call   If symptoms worsen         Medication List        New Prescriptions      doxycycline 100 MG capsule  Commonly known as: MONODOX  Take 1 capsule by mouth 2 (Two) Times a Day.               Where to Get Your Medications        These medications were sent to [id number] - [place], [place] - [address] [address] AT [address] - [phone]  - [phone] FX  [address] [facility] [address]      Phone: [phone]   doxycycline 100 MG capsule

## 2024-07-10 ENCOUNTER — OFFICE VISIT (OUTPATIENT)
Dept: FAMILY MEDICINE CLINIC | Facility: CLINIC | Age: 79
End: 2024-07-10
Payer: MEDICARE

## 2024-07-10 VITALS
TEMPERATURE: 97.7 F | WEIGHT: 165.8 LBS | DIASTOLIC BLOOD PRESSURE: 70 MMHG | BODY MASS INDEX: 32.55 KG/M2 | HEIGHT: 60 IN | HEART RATE: 72 BPM | OXYGEN SATURATION: 95 % | RESPIRATION RATE: 16 BRPM | SYSTOLIC BLOOD PRESSURE: 118 MMHG

## 2024-07-10 DIAGNOSIS — E78.2 MIXED HYPERLIPIDEMIA: ICD-10-CM

## 2024-07-10 DIAGNOSIS — R73.03 PREDIABETES: ICD-10-CM

## 2024-07-10 DIAGNOSIS — I10 ESSENTIAL HYPERTENSION: ICD-10-CM

## 2024-07-10 DIAGNOSIS — R79.89 ELEVATED LFTS: Primary | ICD-10-CM

## 2024-07-10 DIAGNOSIS — Z12.11 COLON CANCER SCREENING: ICD-10-CM

## 2024-07-10 PROCEDURE — 3074F SYST BP LT 130 MM HG: CPT | Performed by: INTERNAL MEDICINE

## 2024-07-10 PROCEDURE — 3078F DIAST BP <80 MM HG: CPT | Performed by: INTERNAL MEDICINE

## 2024-07-10 PROCEDURE — 1126F AMNT PAIN NOTED NONE PRSNT: CPT | Performed by: INTERNAL MEDICINE

## 2024-07-10 PROCEDURE — 99213 OFFICE O/P EST LOW 20 MIN: CPT | Performed by: INTERNAL MEDICINE

## 2024-07-11 LAB
ALP BONE CFR SERPL: 47 % (ref 14–68)
ALP INTEST CFR SERPL: 7 % (ref 0–18)
ALP LIVER CFR SERPL: 47 % (ref 18–85)
ALP SERPL-CCNC: 169 IU/L (ref 44–121)

## 2024-07-12 NOTE — PROGRESS NOTES
Subjective   Jody Simmons is a 78 y.o. female. Patient is here today for   Chief Complaint   Patient presents with    Hypertension          Vitals:    07/10/24 1344   BP: 118/70   Pulse: 72   Resp: 16   Temp: 97.7 °F (36.5 °C)   SpO2: 95%     Body mass index is 32.38 kg/m².      Past Medical History:   Diagnosis Date    Bilateral malignant neoplasm of overlapping sites of breast in female 02/28/2022    Dyslipidemia     Endometrial polyp 04/21/2015    benign     Fibroid     Frozen shoulder 1980s    H/O pelvic mass     Hyperlipidemia     Hypertension yrs ago    Osteopenia     PMB (postmenopausal bleeding)     Primary osteoarthritis of right knee 03/11/2022    Screen for colon cancer 2021    Cologuard: negative result    UTI (urinary tract infection)     Visual impairment Yrs ago    Glasses      No Known Allergies   Social History     Socioeconomic History    Marital status: Single   Tobacco Use    Smoking status: Never    Smokeless tobacco: Never   Vaping Use    Vaping status: Never Used   Substance and Sexual Activity    Alcohol use: Yes     Alcohol/week: 1.0 standard drink of alcohol     Types: 1 Glasses of wine per week     Comment: SOCIAL rare    Drug use: No    Sexual activity: Not Currently     Partners: Male     Birth control/protection: Post-menopausal        Current Outpatient Medications:     acetaminophen (TYLENOL) 325 MG tablet, Take 2 tablets by mouth Every 6 (Six) Hours As Needed for Mild Pain., Disp: , Rfl:     atorvastatin (LIPITOR) 10 MG tablet, Take 1 tablet by mouth Daily., Disp: 90 tablet, Rfl: 0    Calcium Carbonate-Vitamin D (CALTRATE 600+D PO), Take  by mouth., Disp: , Rfl:     Glucosamine-Chondroit-Vit C-Mn (GLUCOSAMINE CHONDR 1500 COMPLX PO), , Disp: , Rfl:     losartan (COZAAR) 100 MG tablet, Take 1 tablet by mouth Daily., Disp: 90 tablet, Rfl: 0    Multiple Vitamin (MULTI VITAMIN DAILY PO), Take 1 tablet by mouth Daily., Disp: , Rfl:     Cholecalciferol (VITAMIN D) 1000 UNITS tablet, Take 1  tablet by mouth Daily., Disp: , Rfl:      Objective     History of Present Illness  She is here today to follow-up on hypertension.    She had reviewed her labs in detail and wished to discuss this with me today.    She feels well.    She has no complaints.  Hypertension         Review of Systems   Constitutional: Negative.    HENT: Negative.     Respiratory: Negative.     Cardiovascular: Negative.    Musculoskeletal: Negative.    Psychiatric/Behavioral: Negative.         Physical Exam  Vitals and nursing note reviewed.   Constitutional:       Appearance: Normal appearance. She is obese.      Comments: Pleasant, neatly groomed, no distress.   Neck:      Vascular: No carotid bruit.   Cardiovascular:      Rate and Rhythm: Regular rhythm.      Heart sounds: Normal heart sounds. No murmur heard.     No gallop.   Pulmonary:      Effort: No respiratory distress.      Breath sounds: Normal breath sounds. No wheezing or rales.   Neurological:      Mental Status: She is alert.   Psychiatric:         Mood and Affect: Mood normal.         Behavior: Behavior normal.         Thought Content: Thought content normal.           Problems Addressed this Visit          Cardiac and Vasculature    Mixed hyperlipidemia    Essential hypertension       Endocrine and Metabolic    Prediabetes     Other Visit Diagnoses       Elevated LFTs    -  Primary    Relevant Orders    Alkaline Phosphatase, Isoenzymes (Completed)    Colon cancer screening        Relevant Orders    Cologuard - Stool, Per Rectum          Diagnoses         Codes Comments    Elevated LFTs    -  Primary ICD-10-CM: R79.89  ICD-9-CM: 790.6     Colon cancer screening     ICD-10-CM: Z12.11  ICD-9-CM: V76.51     Essential hypertension     ICD-10-CM: I10  ICD-9-CM: 401.9     Mixed hyperlipidemia     ICD-10-CM: E78.2  ICD-9-CM: 272.2     Prediabetes     ICD-10-CM: R73.03  ICD-9-CM: 790.29               PLAN  She is due for colon cancer screening.  I put a referral in for  [facility].    She continues to have an elevated alkaline phosphatase.  I ordered an alkaline phosphatase isoenzymes today.    Her hypertension is well-controlled.    Mixed hyperlipidemia is well-controlled on atorvastatin 10 mg daily.    She is prediabetic and obese with BMI 32.  Encouraged her to do best to lose weight via regular aerobic activity for American Heart Association guidelines and decrease caloric intake.    I asked her to follow-up with me for a Medicare subsequent wellness visit in about 6 months.  Fasting labs prior to that visit should include: Lipid profile, comprehensive metabolic panel, CBC, urinalysis, hemoglobin A1c.  No follow-ups on file.  Answers submitted by the patient for this visit:  Other (Submitted on 7/3/2024)  Please describe your symptoms.: Follow up to blood labs for blood pressure & cholesterol  Have you had these symptoms before?: Yes  How long have you been having these symptoms?: Greater than 2 weeks  Please list any medications you are currently taking for this condition.: Losartan & atorvastatin  Primary Reason for Visit (Submitted on 7/3/2024)  What is the primary reason for your visit?: Other

## 2024-07-29 RX ORDER — LOSARTAN POTASSIUM 100 MG/1
100 TABLET ORAL DAILY
Qty: 90 TABLET | Refills: 0 | Status: SHIPPED | OUTPATIENT
Start: 2024-07-29

## 2024-07-29 RX ORDER — ATORVASTATIN CALCIUM 10 MG/1
10 TABLET, FILM COATED ORAL DAILY
Qty: 90 TABLET | Refills: 0 | Status: SHIPPED | OUTPATIENT
Start: 2024-07-29

## 2024-10-14 ENCOUNTER — HOSPITAL ENCOUNTER (OUTPATIENT)
Facility: HOSPITAL | Age: 79
Discharge: HOME | End: 2024-10-14
Attending: EMERGENCY MEDICINE | Admitting: EMERGENCY MEDICINE
Payer: MEDICARE

## 2024-10-14 VITALS
OXYGEN SATURATION: 96 % | DIASTOLIC BLOOD PRESSURE: 56 MMHG | BODY MASS INDEX: 32.39 KG/M2 | HEIGHT: 60 IN | HEART RATE: 104 BPM | SYSTOLIC BLOOD PRESSURE: 152 MMHG | TEMPERATURE: 98.2 F | WEIGHT: 165 LBS | RESPIRATION RATE: 14 BRPM

## 2024-10-14 DIAGNOSIS — U07.1 COVID-19: Primary | ICD-10-CM

## 2024-10-14 LAB
FLUAV SUBTYP SPEC NAA+PROBE: NOT DETECTED
FLUBV RNA ISLT QL NAA+PROBE: NOT DETECTED
SARS-COV-2 RNA RESP QL NAA+PROBE: DETECTED
STREP A PCR: NOT DETECTED

## 2024-10-14 PROCEDURE — G0463 HOSPITAL OUTPT CLINIC VISIT: HCPCS | Performed by: NURSE PRACTITIONER

## 2024-10-14 PROCEDURE — 87636 SARSCOV2 & INF A&B AMP PRB: CPT | Performed by: EMERGENCY MEDICINE

## 2024-10-14 PROCEDURE — 87651 STREP A DNA AMP PROBE: CPT | Performed by: EMERGENCY MEDICINE

## 2024-10-14 NOTE — FSED PROVIDER NOTE
EMERGENCY DEPARTMENT ENCOUNTER    Room Number:  11/11  Date seen:  10/14/2024  Time seen: 12:14 EDT  PCP: Manolo Reddy MD  Historian: patient    Discussed/obtained information from independent historians: n/a    HPI:  Chief complaint:sore throat, headache  A complete HPI/ROS/PMH/PSH/SH/FH are unobtainable due to: n/a  Context:Jody Simmons is a 78 y.o. female with history of mixed hyperlipidemia, hypertension, breast cancer who presents to the ED with c/o acute onset sore throat which started yesterday evening that was slightly better this morning but this morning she woke up with a moderate headache.  She did have a visit last week with her oncology office and sat in a waiting room and also when out over the weekend to [facility] at a restaurant with a friend.  She denies any fever, chills, trouble breathing or chest pain.    ALLERGIES  Patient has no known allergies.    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    Ductal carcinoma in situ (DCIS) of left breast 03/08/2022    Primary osteoarthritis of right knee 03/11/2022    Leg edema, right 09/09/2022    Medicare annual wellness visit, subsequent 11/22/2022    Elevated alkaline phosphatase level 07/21/2023     Resolved Ambulatory Problems     Diagnosis Date Noted    Genitourinary disease 03/22/2016    Blood in the urine 03/22/2016    OP (osteoporosis) 03/22/2016    Candidal  dermatitis 03/22/2016     Past Medical History:   Diagnosis Date    Bilateral malignant neoplasm of overlapping sites of breast in female 02/28/2022    Dyslipidemia     Endometrial polyp 04/21/2015    Fibroid     Frozen shoulder 1980s    H/O pelvic mass     Hyperlipidemia     Hypertension yrs ago    PMB (postmenopausal bleeding)     Screen for colon cancer 2021    UTI (urinary tract infection)     Visual impairment Yrs ago       PAST SURGICAL HISTORY  Past Surgical History:   Procedure Laterality Date    BREAST BIOPSY Bilateral 02/18/2022    STEROTACTIC BREAST BIOPSY; Left Breast Site #1 Outer Middle bowtie biopsy clip; Left Breast Site #2 12:00 position barbell clip deployed; Right Breast Upper Inner Posterior Bowtie clip deployed;    COLONOSCOPY N/A 2009    HYSTERECTOMY  2015    OOPHORECTOMY      SKIN LESION EXCISION      excision right post. calf (benign for melaoma)       FAMILY HISTORY  Family History   Problem Relation Age of Onset    Diabetes Mother     Heart disease Father     Diabetes Brother     Cancer Brother         prostate       SOCIAL HISTORY  Social History     Socioeconomic History    Marital status: Single   Tobacco Use    Smoking status: Never    Smokeless tobacco: Never   Vaping Use    Vaping status: Never Used   Substance and Sexual Activity    Alcohol use: Yes     Alcohol/week: 1.0 standard drink of alcohol     Types: 1 Glasses of wine per week     Comment: SOCIAL rare    Drug use: No    Sexual activity: Not Currently     Partners: Male     Birth control/protection: Post-menopausal       REVIEW OF SYSTEMS  Review of Systems    All systems reviewed and negative except for those discussed in HPI.     PHYSICAL EXAM    I have reviewed the triage vital signs and nursing notes.  Vitals:    10/14/24 1215   BP: 152/56   Pulse: 104   Resp: 14   Temp: 98.2 °F (36.8 °C)   SpO2: 96%     Physical Exam    GENERAL: not distressed, well dressed and groomed, pleasant and cooperative  HENT: jennifer patent, no  tonsillar erythema edema or exudates.  TMs normal.  Voice normal.  No drooling  EYES: no scleral icterus  NECK: no ROM limitations  CV: regular rhythm, regular rate, no murmur  RESPIRATORY: normal effort, clear to auscultate bilaterally.  No wheezing.  Speaks in full sentences  ABDOMEN: soft  : deferred  MUSCULOSKELETAL: no deformity  NEURO: alert, moves all extremities, follows commands  SKIN: warm, dry    LAB RESULTS  Recent Results (from the past 24 hours)   Rapid Strep A Screen - Swab, Throat    Collection Time: 10/14/24 12:18 PM    Specimen: Throat; Swab   Result Value Ref Range    STREP A PCR Not Detected Not Detected   COVID-19 and FLU A/B PCR, 1 HR TAT - Swab, Nasopharynx    Collection Time: 10/14/24 12:18 PM    Specimen: Nasopharynx; Swab   Result Value Ref Range    COVID19 Detected (C) Not Detected - Ref. Range    Influenza A PCR Not Detected Not Detected    Influenza B PCR Not Detected Not Detected       Ordered the above labs and independently interpreted results.  My findings will be discussed in the ED course or medical decision making section below    PROGRESS, DATA ANALYSIS, CONSULTS AND MEDICAL DECISION MAKING    Please note that this section constitutes my independent interpretation of clinical data including lab results, radiology, EKG's.  This constitutes my independent professional opinion regarding differential diagnosis and management of this patient.  It may include any factors such as history from outside sources, review of external records, social determinants of health, management of medications, response to those treatments, and discussions with other providers.    ED Course as of 10/14/24 1255   Mon Oct 14, 2024   1242 COVID19(!!): Detected [EW]      ED Course User Index  [EW] Tanisha Bowman APRN     Orders placed during this visit:  Orders Placed This Encounter   Procedures    Rapid Strep A Screen - Swab, Throat    COVID-19 and FLU A/B PCR, 1 HR TAT - Swab, Nasopharynx             Medical Decision Making  This patient presents with symptoms suspicious for likely viral upper respiratory infection. Based on history and physical doubt sinusitis. COVID test positive today.  No hypoxia or labored breathing. I do not suspect underlying cardiopulmonary process. I considered, but think unlikely, dangerous causes of this patient’s symptoms to include ACS, CHF or COPD exacerbations, pneumonia, pneumothorax. Patient is nontoxic appearing and not in need of emergent medical intervention. Patient told to self isolate at home until symptoms subside.  We did have shared decision making regarding Paxlovid.  She was instructed to hold her statin.     DIAGNOSIS  Final diagnoses:   COVID-19          Medication List        New Prescriptions      Nirmatrelvir & Ritonavir (300mg/100mg)  Commonly known as: PAXLOVID  Take 3 tablets by mouth 2 (Two) Times a Day for 5 days.               Where to Get Your Medications        These medications were sent to [id number] - [place], [place] - [address] [address] AT [facility] [address] - [phone]  - [phone] [address] [address]      Phone: [phone]   Nirmatrelvir & Ritonavir (300mg/100mg)         FOLLOW-UP  Manolo Reddy MD  [address] [facility] [address] [phone]    Schedule an appointment as soon as possible for a visit in 3 days  As needed, If symptoms worsen        Latest Documented Vital Signs:  As of 12:55 EDT  BP- 152/56 HR- 104 Temp- 98.2 °F (36.8 °C) (Oral) O2 sat- 96%    Appropriate PPE utilized throughout this patient encounter to include mask, if indicated, per current protocol. Hand hygiene was performed before donning PPE and after removal when leaving the room.    Please note that portions of this were completed with a voice recognition program.     Note Disclaimer: At [facility], we believe that sharing information builds trust and better relationships. You are receiving  this note because you are receiving care at [facility] or recently visited. It is possible you will see health information before a provider has talked with you about it. This kind of information can be easy to misunderstand. To help you fully understand what it means for your health, we urge you to discuss this note with your provider.

## 2024-10-14 NOTE — DISCHARGE INSTRUCTIONS
"HOLD YOUR CHOLESTEROL PILL WHILE ON PAXLOVID AND FOR 5 DAYS AFTER STOPPING PAXLOVID    Please read through the information below. This information will provide you with additional instructions for home care.    Please start on a multivitamin if you are not already taking one. The best multivitamin available is the Prenatal Vitamin. It does not matter if you are a male or female. You are capable of taking this supplement. Vitamin C, Calcium, Magnesium and Zinc have shown with limited research data to be beneficial for helping the body fight off infections. [employer] have a single pill with Vitamin C, Zinc, Magnesium plus Vitamin D3. This is the most convient supplement to take with all of the additional key supplements for immune support.    COLD AND FLU     Colds are very common viral illnesses that occur year round, but primarily in winter months. Because there are many viruses that cause colds, people may experience multiple colds per year, or find that their symptoms vary with each infection. The flu is also caused by a virus, but is due to a specific virus called influenza virus. The typical symptoms of the flu are high fever, body aches, fatigue, and headache.     The main treatment for these viral infections is supportive care.     Supportive treatment consists of taking medications to treat the symptoms, so that you are more comfortable while you are recovering from the illness. Colds usually last 7-14 days and the flu about 7 days.     Over-the-counter medications for symptomatic relief may include: Mucinex (maximum 3 days), Sudafed, DayQuil/NyQuil, flonase nasal spray.  If you have history of high blood pressure please use caution with these medications.  Check with pharmacist for recommendations.  Coricidin has brand \"HBP\" which is better for patient's with high blood pressure.       If you tested positive for the flu, Tamiflu and Xofluza are prescription antiviral medications that work best " "if taken in the first 24 to 48 hours. If you have had symptoms longer than 48 hours this medication will not be beneficial. Research shows no decrease in length or severity of illness when Tamiflu is not started within 48 hours. These medications were considered when looking at your illness, symptoms and severity of your illness.     Antibiotics are also not beneficial for a viral illness. Antibiotics only work against bacteria, and not viruses.     Please read through the information below. This information will provide you with additional instructions for home care.    VIRAL RESPIRATORY INFECTIONS CAN CAUSE: SINUSITIS, CHEST COLDS, BRONCHITIS, SORE THROAT, EAR ACHES, EAR INFECTIONS, RESPIRATORY FLU, STOMACH FLU.    -ANTIBIOTICS SHOULD NOT BE USED FOR VIRAL INFECTIONS. OUR BODIES NEED 10-14 DAYS TO \"FIGHT OFF\" VIRAL INFECTIONS.   -TAKE ANTIBIOTICS ONLY IF: RECOMMENDED BY YOUR PROVIDER, YOU ARE GETTING WORSE, YOU ARE NOT GETTING BETTER IN 10-14 DAYS.  -AVOID UNNECESSARY ANTIBIOTICS, THE MORE ANTIBIOTICS PEOPLE USE THE MORE LIKELY THEY ARE TO GET INFECTIONS THAT LAST LONGER AND ARE MORE DIFFICULT TO TREAT  -COLD SYMPTOMS INCLUDE: RUNNY NOSE, NASAL CONGESTION, SNEEZING, SORE THROAT, COUGH AND HEADACHE  -CHILDREN WILL OFTEN RUN A FEVER -102 F  -COLDS OCCUR ALL YEAR ROUND  -CHILDREN WILL TYPICALLY GET 8 COLDS A YEAR    Virus precautions, simple things to do at home to help with illness:     Wash/sanitize common household surfaces with antibacterial wipes.  Especially door knobs, light switches, tablets, remote controls, game consoles, cell phones.     Change bed linens and wash bath towels/washcloths    Frequent handwashing    Cough/sneeze into your sleeve      Within one to three days, the nasal secretions usually become thicker and perhaps yellow or green. This is a normal part of the common cold and not a reason for antibiotics. Symptoms usually go away in 7-10 days. If symptoms do not resolve in 7-10 days or " worsen despite recommended treatment then re-evaluation must be sought as a viral infection can turn into a bacterial infection    Please try OTC therapy for 3-5 days. This should help with your symptoms. It would be best to start on an OTC allergy pill or an OTC medication to treat the symptoms which you have.     Take an Allergy pill for congestion, runny nose, itchy watery eyes, sinus pain or pressure.     Tylenol/Ibuprofen (age appropriate dose) for pain or fever.     Additionally, plenty of rest and fluids are also important.     Below is a list of what to do for common symptoms associated with a cold or the flu. The recommendations below are for adult patients. Please make sure the treatments you use are age appropriate as not all information noted below can be given to children.       1. SORE THROAT Ibuprofen (also called Motrin & Advil) and acetaminophen (APAP or Tylenol ) are the most effective pain relievers for a sore throat.     Adults can take 2-3 ibuprofen every 6 hours with food or Tylenol 1000 mg up to 3 times a day as needed. Additionally, salt water gargles (1 tsp. salt in 1 cup of warm water) and lozenges may provide temporary relief of a sore throat.     For children over the age of two and adults - a spoonful of honey may be beneficial for both sore throat and cough.     For children, please follow the instructions on the package. There are different formularies to consider dosing    Sore throats are caused by many medical conditions including allergies, a virus, sinus issues, strep, and more.  Try to manage your sore throat at home. If over the counter medication has not been beneficial and your symptoms are continuing to worsen we ask you seek evaluation.    A severe sore throat should be evaluated by a practitioner, especially if accompanied by a fever over 100.3.   In talking with patients, most say that spraying with chloraseptic is not helpful.    Salt water gargles will help a sore  throat.  For salt water gargles combine 1 teaspoon salt to 8 ounces of warm water and swish and spit.  This can be done 4-5 times a day with a fresh batch of salt and warm water      2. NASAL CONGESTION The best nasal decongestant for most people is pseudoephedrine (Sudafed or a generic, but NOT Sudafed PE). For adults, the usual dose is 60 mg every 4 to 6 hours. This helps with a runny nose and clogged nasal passages, making it easier to breathe. Because Sudafed can be used to make illegal drugs, it is kept behind the counter at your local drugstore. This means you have to ask for the medication and are required to show your ’s license to buy it. Sudafed may make it difficult to sleep at night, so try to avoid taking any before bed if it bothers you. People with high blood pressure should not take pseudoephedrine, phenylephrine, or Afrin nasal spray as all of these can increase blood pressure and potentially lead to strokes. Do not use Afrin nasal spray longer than 3 days, as it can make nasal congestion worse if used too long.     For children over the age of 6 you may use Delsym from over the counter.    Zyrtec or Claritin is also beneficial for allergy symptoms including nasal congestion    For nasal congestion at night, or if you have high blood pressure and can’t take Sudafed or Afrin, use decongestants that come with an antihistamine (such as Coricidan HBP Cold and Flu or Benadryl). These can sometimes make you sleepy, so be sure to see how your body reacts to the drug before driving or going to work. Saline nasal sprays may also be helpful. Many patients find the Neti-pot saline rinse to be useful in clearing out their sinuses as well.    3. COUGH A good all-purpose cough medicine is Delsym. It contains a long-acting version of dextromethorphan, a 12 hour cough suppressant, to help keep the cough under control. If you feel like you have mucus in the chest as well, you could try a cough suppressant  combined with an expectorant.     Examples of this include Robitussin DM (every 4 to 6 hours) or Mucinex DM (every 12 hours). If the cough is not helped by this or if you have trouble breathing, please see a practitioner.     Zyrtec or Claritin will help with a cough caused by post-nasal drainage or allergy related congestion.    4. FEVER/BODY ACHES - You do not need to be seen at the first sign of a fever. A fever is a sign your immune system is working appropriately. A fever alone does not mean you need to be evaluated. The other symptoms, concerns, and health conditions including the instructions your personal family physician and specialists should be considered. Please follow their instruction. If your fever persists for more than 24 to 48 hours and you are concerned for a significant illness please use common sense and seek evaluation. Ibuprofen and acetaminophen will help control fevers, as well as the aches and pains associated with a viral illness. See the sore throat section for proper doses of these medications. Be sure not to exceed the recommended dose for each medicine, and remember that it’s best to take Advil with food.    *NOTE OF CAUTION: Many over the counter cold preparations also contain Tylenol/acetaminophen. Be sure to check cold remedy labels so that you do not take too much Tylenol by mistake. Maximum is 3,000 mg per 24 hours.     Influenza is a viral illness. Generally this infection has to run it's course. It can take 7-14 days for a viral illness to pass. Please treat your symptoms with over the counter medication. If your test was positive, treatment with Tamiflu or Xofluza was considered. You have to meet specific criteria in order to be eligible for this treatment. If this medication was prescribed for you, please start on it immediately. If your symptoms have been present more than 2-3 days it may not be effective at helping you through your illness.     COVID-19 is another viral  infection that we now combate within our society. This infection has shown to cause many health complications. If you were COVID positive there is additional therapies you may take to help with symptoms. These additional therapies were considered during your office visit today. Please seek re-evaluation immediately if you develop concerns for pneumonia or other secondary complication from this infection.    If you have a personal or family history of blood clots (DVT), pulumonary embolism (PE), stroke, or heart attack or are at high risk for any of these complications then please talk with your doctor or nurse practitioner about starting on a baby aspirin (81 mg tablet). These have shown to be beneficial to prevent the above complications from occurring or recurring.    If you develop gastritis (irritation of the stomach) including heartburn or indigestion then over the counter PEPCID may be beneficial. Take this medication as directed on the package.    If you develop loss of taste or smell, peppermint candy or the use of peppermint essential oil in a home diffuser for aromatic purposes may be beneficial. Please remember everything in life has risks and benefits. Neither of these are without risks and you must consider your own health conditions and home situation. Do not diffuse peppermint essential oil in a closed room where pets are unable to escape.    Generally a viral illness is worse the first 3-5 days. Over the counter medication and home therapy will help with your symptoms. Management of your illness at home for a few days will provide us with additional information on how to help you manage your illness. Antibiotics will only be beneficial if your symptoms are caused by a bacteria.     If your symptoms do not improve in 7-10 days or your symptoms become worse despite the recommendations above then please seek re-evaluation.    When this treatment fails or symptoms continue to worsen despite this  treatment, re-evaluation is advised as your viral infection or allergy flare up can turn into a secondary bacterial infection where antibiotics would be recommended    Your diagnosis today is based on the information you provided me during today's office visit. It is important you seek re-evaluation immediately if you develop new symptoms, worsening symptoms, or become concerned something else may be going on. It is important for you to call your family physician to schedule an appointment for further evaluation, treatment and care of your complaints. Sometimes your health concerns are not as straight forward as they may initially seem.    Call your doctor immediately to schedule an appointment for re-evaluation.    Should symptoms worsen despite the treatment provided to you here today, then you must go to the ER especially if you are concerned you have a possible emergent situation.     Return Precautions    Although you are being discharged from the ED today, I encourage you to return for worsening symptoms.  Things can, and do, change such that treatment at home with medication may not be adequate.      Specifically, return for any of the following:    Chest pain, shortness of breath, pain or nausea and vomiting not controlled by medications provided.    Please make a follow up with your Primary Care Provider for a blood pressure recheck.

## 2024-10-21 RX ORDER — LOSARTAN POTASSIUM 100 MG/1
100 TABLET ORAL DAILY
Qty: 90 TABLET | Refills: 0 | Status: SHIPPED | OUTPATIENT
Start: 2024-10-21

## 2024-10-21 RX ORDER — ATORVASTATIN CALCIUM 10 MG/1
10 TABLET, FILM COATED ORAL DAILY
Qty: 90 TABLET | Refills: 0 | Status: SHIPPED | OUTPATIENT
Start: 2024-10-21

## 2024-10-23 ENCOUNTER — OFFICE VISIT (OUTPATIENT)
Dept: FAMILY MEDICINE CLINIC | Facility: CLINIC | Age: 79
End: 2024-10-23
Payer: MEDICARE

## 2024-10-23 VITALS
HEART RATE: 88 BPM | DIASTOLIC BLOOD PRESSURE: 70 MMHG | TEMPERATURE: 96.6 F | WEIGHT: 166 LBS | RESPIRATION RATE: 16 BRPM | HEIGHT: 60 IN | OXYGEN SATURATION: 96 % | BODY MASS INDEX: 32.59 KG/M2 | SYSTOLIC BLOOD PRESSURE: 130 MMHG

## 2024-10-23 DIAGNOSIS — E78.00 HYPERCHOLESTEROLEMIA: ICD-10-CM

## 2024-10-23 DIAGNOSIS — I10 ESSENTIAL HYPERTENSION: Primary | ICD-10-CM

## 2024-10-23 PROCEDURE — 1126F AMNT PAIN NOTED NONE PRSNT: CPT | Performed by: INTERNAL MEDICINE

## 2024-10-23 PROCEDURE — 99213 OFFICE O/P EST LOW 20 MIN: CPT | Performed by: INTERNAL MEDICINE

## 2024-10-23 PROCEDURE — 3078F DIAST BP <80 MM HG: CPT | Performed by: INTERNAL MEDICINE

## 2024-10-23 PROCEDURE — 3075F SYST BP GE 130 - 139MM HG: CPT | Performed by: INTERNAL MEDICINE

## 2024-10-25 PROBLEM — E78.00 HYPERCHOLESTEROLEMIA: Status: ACTIVE | Noted: 2024-10-25

## 2024-10-25 NOTE — PROGRESS NOTES
Answers submitted by the patient for this visit:  Other (Submitted on 10/21/2024)  Please describe your symptoms.: Follow up to Urgent Care visit on 10/14/24 which put me on Paxlovid for covid.  No fever.  Symptoms gone., Paxlovid finished.  Just a follow up.  Have you had these symptoms before?: Yes  How long have you been having these symptoms?: 1-2 weeks  Please list any medications you are currently taking for this condition.: Paxlovid dosage ended on 10/19/24  Primary Reason for Visit (Submitted on 10/21/2024)  What is the primary reason for your visit?: Problem Not Listed  Subjective   Jody Simmons is a 78 y.o. female. Patient is here today for   Chief Complaint   Patient presents with    Exposure To Known Illness     Covid;BH 10/14/24    Med Management     Questions about cholesterol medication        History of Present Illness  She is here today to follow-up on recent urgent treatment center visit for a COVID-19 infection.  She was prescribed Paxil bid and is over her symptoms.    She denies dyspnea, fevers, chills, or headaches.  She does not have a cough.    History of Present Illness  The patient presents for evaluation of multiple medical concerns.    She reports a slight decrease in energy levels but overall feels well. Her blood pressure was recorded as 150 during her last visit to the oncologist on 10/14/2024. She has been seeing Dr. Marc every six months and her health status has been satisfactory. She is scheduled for lab tests on 11/18/2024. Her previous lab results were normal, except for an elevated alkaline phosphatase level. She was advised to repeat the test, which returned normal results. She suspects her cholesterol medication might be causing the elevated alkaline phosphatase level.    She was informed that her cholesterol medication dosage would be increased from 10 mg to 20 mg, but this change was not implemented. She had enough 10 mg tablets and started taking two tablets at night.  She has sufficient cholesterol medication to last until her next appointment. She was advised to stop taking her cholesterol medication while on Paxlovid and to resume it five days after stopping Paxlovid. She plans to restart her cholesterol medication tonight. She receives her medications every two months from [facility].    She has not seen Dr. Echeverria since 08/2023 and does not feel the need for a steroid injection. She experiences pain in her right knee when descending stairs, but walking does not cause discomfort. The pain does not disturb her sleep and she has not experienced any falls. She has not requested any therapy.    She occasionally experiences pain under her breast when she moves quickly to reach something. She was informed that this is common in radiation patients due to the interaction between radiation and muscles or membranes. She has not been very physically active recently. The pain is not constant and subsides after a brief period.      Vitals:    10/23/24 1425   BP: 130/70   Pulse: 88   Resp: 16   Temp: 96.6 °F (35.9 °C)   SpO2: 96%     Body mass index is 32.42 kg/m².    Past Medical History:   Diagnosis Date    Bilateral malignant neoplasm of overlapping sites of breast in female 02/28/2022    Dyslipidemia     Endometrial polyp 04/21/2015    benign     Fibroid     Frozen shoulder 1980s    H/O pelvic mass     Hyperlipidemia     Hypertension yrs ago    Osteopenia     PMB (postmenopausal bleeding)     Primary osteoarthritis of right knee 03/11/2022    Screen for colon cancer 2021    Cologuard: negative result    UTI (urinary tract infection)     Visual impairment Yrs ago    Glasses      No Known Allergies   Social History     Socioeconomic History    Marital status: Single   Tobacco Use    Smoking status: Never    Smokeless tobacco: Never   Vaping Use    Vaping status: Never Used   Substance and Sexual Activity    Alcohol use: Yes     Alcohol/week: 1.0 standard drink of alcohol     Types: 1  Glasses of wine per week     Comment: SOCIAL rare    Drug use: No    Sexual activity: Not Currently     Partners: Male     Birth control/protection: Post-menopausal        Current Outpatient Medications:     acetaminophen (TYLENOL) 325 MG tablet, Take 2 tablets by mouth Every 6 (Six) Hours As Needed for Mild Pain., Disp: , Rfl:     atorvastatin (LIPITOR) 10 MG tablet, Take 1 tablet by mouth Daily., Disp: 90 tablet, Rfl: 0    Calcium Carbonate-Vitamin D (CALTRATE 600+D PO), Take  by mouth., Disp: , Rfl:     Cholecalciferol (VITAMIN D) 1000 UNITS tablet, Take 1 tablet by mouth Daily., Disp: , Rfl:     Glucosamine-Chondroit-Vit C-Mn (GLUCOSAMINE CHONDR 1500 COMPLX PO), , Disp: , Rfl:     losartan (COZAAR) 100 MG tablet, Take 1 tablet by mouth Daily., Disp: 90 tablet, Rfl: 0    Multiple Vitamin (MULTI VITAMIN DAILY PO), Take 1 tablet by mouth Daily., Disp: , Rfl:      Objective     Review of Systems   Constitutional: Negative.    HENT: Negative.     Respiratory: Negative.     Cardiovascular: Negative.    Musculoskeletal:         She has knee pain and follows up with orthopedic surgery for management.   Psychiatric/Behavioral: Negative.         Physical Exam  Vitals and nursing note reviewed.   Constitutional:       General: She is not in acute distress.     Appearance: Normal appearance. She is obese. She is not ill-appearing, toxic-appearing or diaphoretic.      Comments: Pleasant, neatly groomed, no distress.   Neck:      Vascular: No carotid bruit.   Cardiovascular:      Rate and Rhythm: Regular rhythm.      Heart sounds: Normal heart sounds. No murmur heard.     No gallop.   Pulmonary:      Effort: No respiratory distress.      Breath sounds: Normal breath sounds. No wheezing or rales.   Neurological:      Mental Status: She is alert and oriented to person, place, and time.   Psychiatric:         Mood and Affect: Mood normal.         Behavior: Behavior normal.         Thought Content: Thought content normal.        Physical Exam  Vital Signs  Blood pressure is 130/70.    Results  Laboratory Studies  Alkaline phosphatase levels were normal.    Assessment & Plan    Problems Addressed this Visit          Cardiac and Vasculature    Essential hypertension - Primary    Hypercholesterolemia     Diagnoses         Codes Comments    Essential hypertension    -  Primary ICD-10-CM: I10  ICD-9-CM: 401.9     Hypercholesterolemia     ICD-10-CM: E78.00  ICD-9-CM: 272.0           Assessment & Plan  1. Post-COVID Fatigue.  She reports feeling tired for three to four weeks after tessie COVID-19, which is a common symptom. No specific treatment was recommended as the fatigue is expected to resolve on its own.    2. Hypertension.  Her blood pressure was 150 when she visited her oncologist, but it was 130/70 during the visit. She is advised to monitor her blood pressure regularly.    3. Elevated Alkaline Phosphatase.  Previous tests showed elevated alkaline phosphatase levels, but follow-up tests were normal. The cause remains unclear, but it may be related to her cholesterol medication.    4. Hypercholesterolemia.  She was instructed to increase her cholesterol medication dosage from 10 mg to 20 mg. She has been taking two 10 mg pills at night to achieve the 20 mg dosage. She is advised to resume her cholesterol medication tonight after pausing it for five days post-Paxil treatment. Her cholesterol and liver enzyme levels will be checked during her lab visit on November 18.    5. Glucosamine Use.  She inquired about continuing glucosamine, which is not contraindicated with her current medications. She is advised to resume taking glucosamine if it helps with her symptoms.    6. Right Knee Pain.  She experiences pain when going up and down steps but reports no pain while walking or at night. She does not feel the need to see Dr. Echeverria for this issue at this time. She will follow up if the pain worsens.    7. Radiation-Induced Muscle  Pain.  She experiences occasional pain under her breast when making sudden movements, which is common in radiation patients. No specific treatment was recommended as the pain is infrequent and resolves quickly.    Follow-up  Return in 3-4 weeks for follow up.      No follow-ups on file.    Patient or patient representative verbalized consent for the use of Ambient Listening during the visit with  Manolo Reddy MD for chart documentation. 10/25/2024  12:49 EDT

## 2024-11-14 ENCOUNTER — TELEPHONE (OUTPATIENT)
Dept: FAMILY MEDICINE CLINIC | Facility: CLINIC | Age: 79
End: 2024-11-14

## 2024-11-14 NOTE — TELEPHONE ENCOUNTER
Caller: Jody Simmons    Relationship to patient: Self    Best call back number: [phone]      Patient is needing: SCHEDULED A WELLNESS VISIT WITH EMMA CHAMPAGNE DUE TO DATE.

## 2024-11-21 ENCOUNTER — OFFICE VISIT (OUTPATIENT)
Dept: FAMILY MEDICINE CLINIC | Facility: CLINIC | Age: 79
End: 2024-11-21
Payer: MEDICARE

## 2024-11-21 VITALS
DIASTOLIC BLOOD PRESSURE: 70 MMHG | TEMPERATURE: 96.8 F | BODY MASS INDEX: 32.31 KG/M2 | HEART RATE: 67 BPM | OXYGEN SATURATION: 97 % | HEIGHT: 60 IN | WEIGHT: 164.6 LBS | SYSTOLIC BLOOD PRESSURE: 140 MMHG | RESPIRATION RATE: 15 BRPM

## 2024-11-21 DIAGNOSIS — E78.00 HYPERCHOLESTEROLEMIA: ICD-10-CM

## 2024-11-21 DIAGNOSIS — I10 ESSENTIAL HYPERTENSION: Primary | ICD-10-CM

## 2024-11-21 PROCEDURE — 3077F SYST BP >= 140 MM HG: CPT | Performed by: INTERNAL MEDICINE

## 2024-11-21 PROCEDURE — 1126F AMNT PAIN NOTED NONE PRSNT: CPT | Performed by: INTERNAL MEDICINE

## 2024-11-21 PROCEDURE — 3078F DIAST BP <80 MM HG: CPT | Performed by: INTERNAL MEDICINE

## 2024-11-21 PROCEDURE — 99214 OFFICE O/P EST MOD 30 MIN: CPT | Performed by: INTERNAL MEDICINE

## 2024-11-21 RX ORDER — ATORVASTATIN CALCIUM 20 MG/1
20 TABLET, FILM COATED ORAL DAILY
Qty: 90 TABLET | Refills: 3 | Status: SHIPPED | OUTPATIENT
Start: 2024-11-21

## 2024-11-22 NOTE — PROGRESS NOTES
Answers submitted by the patient for this visit:  Primary Reason for Visit (Submitted on 11/19/2024)  What is the primary reason for your visit?: Problem Not Listed  Subjective   Jody Simmons is a 78 y.o. female. Patient is here today for   Chief Complaint   Patient presents with    Hypertension        History of Present Illness  This very nice patient is here to follow-up on labs done last week..    She has no complaints.  Hypertension  Pertinent negatives include no chest pain, headaches or neck pain.   Today's concern : Follow up to blood work (labs).  Discuss cholesterol med change..   Symptoms are recurrent.   Onset was 1 to 5 years.   Symptoms occur intermittently.   Symptoms include joint pain and myalgias.  Pertinent negative symptoms include no abdominal pain, no anorexia, no change in stool, no chest pain, no chills, no congestion, no cough, no diaphoresis, no fatigue, no fever, no headaches, no joint swelling, no nausea, no neck pain, no numbness, no rash, no sore throat, no swollen glands, no dysuria, no vertigo, no visual change, no vomiting and no weakness.   Treatment and/or Medications comments include: Tylenol & Glucosamine (joints ) & atorvastatin (cholesterol)   Additional information:  Need to discuss cholesterol med change     History of Present Illness        Vitals:    11/21/24 0954   BP: 140/70   Pulse: 67   Resp: 15   Temp: 96.8 °F (36 °C)   SpO2: 97%     Body mass index is 32.15 kg/m².    Past Medical History:   Diagnosis Date    Bilateral malignant neoplasm of overlapping sites of breast in female 02/28/2022    Dyslipidemia     Endometrial polyp 04/21/2015    benign     Fibroid     Frozen shoulder 1980s    H/O pelvic mass     Hyperlipidemia     Hypertension yrs ago    Osteopenia     PMB (postmenopausal bleeding)     Primary osteoarthritis of right knee 03/11/2022    Screen for colon cancer 2021    Cologuard: negative result    UTI (urinary tract infection)     Visual impairment Yrs ago     Glasses      No Known Allergies   Social History     Socioeconomic History    Marital status: Single   Tobacco Use    Smoking status: Never     Passive exposure: Never    Smokeless tobacco: Never   Vaping Use    Vaping status: Never Used   Substance and Sexual Activity    Alcohol use: Yes     Alcohol/week: 1.0 standard drink of alcohol     Types: 1 Glasses of wine per week     Comment: SOCIAL rare    Drug use: No    Sexual activity: Not Currently     Partners: Male     Birth control/protection: Post-menopausal        Current Outpatient Medications:     acetaminophen (TYLENOL) 325 MG tablet, Take 2 tablets by mouth Every 6 (Six) Hours As Needed for Mild Pain., Disp: , Rfl:     atorvastatin (LIPITOR) 20 MG tablet, Take 1 tablet by mouth Daily., Disp: 90 tablet, Rfl: 3    Calcium Carbonate-Vitamin D (CALTRATE 600+D PO), Take  by mouth., Disp: , Rfl:     Cholecalciferol (VITAMIN D) 1000 UNITS tablet, Take 1 tablet by mouth Daily., Disp: , Rfl:     Glucosamine-Chondroit-Vit C-Mn (GLUCOSAMINE CHONDR 1500 COMPLX PO), , Disp: , Rfl:     losartan (COZAAR) 100 MG tablet, Take 1 tablet by mouth Daily., Disp: 90 tablet, Rfl: 0    Multiple Vitamin (MULTI VITAMIN DAILY PO), Take 1 tablet by mouth Daily., Disp: , Rfl:      Objective     Review of Systems   Constitutional:  Negative for chills, diaphoresis, fatigue and fever.   HENT:  Negative for congestion and sore throat.    Respiratory:  Negative for cough.    Cardiovascular:  Negative for chest pain.   Gastrointestinal:  Negative for abdominal pain, anorexia, nausea and vomiting.   Genitourinary:  Negative for dysuria.   Musculoskeletal:  Positive for joint pain and myalgias. Negative for neck pain.   Skin:  Negative for rash.   Neurological:  Negative for vertigo, weakness, numbness and headaches.       Physical Exam  Vitals and nursing note reviewed.   Constitutional:       General: She is not in acute distress.     Appearance: Normal appearance. She is obese. She is not  ill-appearing, toxic-appearing or diaphoretic.      Comments: Pleasant, neatly groomed, no distress.   Cardiovascular:      Rate and Rhythm: Regular rhythm.      Heart sounds: Normal heart sounds. No murmur heard.     No gallop.   Pulmonary:      Effort: No respiratory distress.      Breath sounds: Normal breath sounds. No wheezing or rales.   Neurological:      Mental Status: She is alert and oriented to person, place, and time.   Psychiatric:         Mood and Affect: Mood normal.         Behavior: Behavior normal.         Thought Content: Thought content normal.       Physical Exam      Results      Assessment & Plan    Problems Addressed this Visit          Cardiac and Vasculature    Essential hypertension - Primary    Hypercholesterolemia    Relevant Medications    atorvastatin (LIPITOR) 20 MG tablet     Diagnoses         Codes Comments    Essential hypertension    -  Primary ICD-10-CM: I10  ICD-9-CM: 401.9     Hypercholesterolemia     ICD-10-CM: E78.00  ICD-9-CM: 272.0           Assessment & Plan    Her hypertension is well-controlled.  When I checked her blood pressure in her right arm she was seated I got 128/70.    Her hypercholesterolemia is not so well-controlled today.  Her LDL cholesterol is 107.  She is currently taking atorvastatin 10 mg daily.  I increase that to 20 mg daily instead.    I will have her back in about 3 months.  Fasting labs prior to the visit should include: Lipid profile, comprehensive metabolic panel.    No follow-ups on file.    Patient or patient representative verbalized consent for the use of Ambient Listening during the visit with  Manolo Reddy MD for chart documentation. 11/22/2024  15:45 EST

## 2024-12-06 ENCOUNTER — OFFICE VISIT (OUTPATIENT)
Dept: FAMILY MEDICINE CLINIC | Facility: CLINIC | Age: 79
End: 2024-12-06
Payer: MEDICARE

## 2024-12-06 VITALS
WEIGHT: 164 LBS | OXYGEN SATURATION: 95 % | HEIGHT: 60 IN | HEART RATE: 78 BPM | TEMPERATURE: 98.2 F | SYSTOLIC BLOOD PRESSURE: 122 MMHG | RESPIRATION RATE: 17 BRPM | DIASTOLIC BLOOD PRESSURE: 74 MMHG | BODY MASS INDEX: 32.2 KG/M2

## 2024-12-06 DIAGNOSIS — E78.2 MIXED HYPERLIPIDEMIA: ICD-10-CM

## 2024-12-06 DIAGNOSIS — Z00.00 MEDICARE ANNUAL WELLNESS VISIT, SUBSEQUENT: Primary | ICD-10-CM

## 2024-12-06 DIAGNOSIS — M85.80 OSTEOPENIA, UNSPECIFIED LOCATION: ICD-10-CM

## 2024-12-06 DIAGNOSIS — I10 ESSENTIAL HYPERTENSION: ICD-10-CM

## 2024-12-06 DIAGNOSIS — Z23 NEED FOR INFLUENZA VACCINATION: ICD-10-CM

## 2024-12-06 DIAGNOSIS — R73.03 PREDIABETES: ICD-10-CM

## 2024-12-06 PROBLEM — H35.3130 BILATERAL NONEXUDATIVE AGE-RELATED MACULAR DEGENERATION: Status: ACTIVE | Noted: 2021-04-02

## 2024-12-06 PROCEDURE — 1159F MED LIST DOCD IN RCRD: CPT | Performed by: NURSE PRACTITIONER

## 2024-12-06 PROCEDURE — G0008 ADMIN INFLUENZA VIRUS VAC: HCPCS | Performed by: NURSE PRACTITIONER

## 2024-12-06 PROCEDURE — 90662 IIV NO PRSV INCREASED AG IM: CPT | Performed by: NURSE PRACTITIONER

## 2024-12-06 PROCEDURE — 3078F DIAST BP <80 MM HG: CPT | Performed by: NURSE PRACTITIONER

## 2024-12-06 PROCEDURE — 99213 OFFICE O/P EST LOW 20 MIN: CPT | Performed by: NURSE PRACTITIONER

## 2024-12-06 PROCEDURE — 1160F RVW MEDS BY RX/DR IN RCRD: CPT | Performed by: NURSE PRACTITIONER

## 2024-12-06 PROCEDURE — 3074F SYST BP LT 130 MM HG: CPT | Performed by: NURSE PRACTITIONER

## 2024-12-06 PROCEDURE — G0439 PPPS, SUBSEQ VISIT: HCPCS | Performed by: NURSE PRACTITIONER

## 2024-12-06 PROCEDURE — 1126F AMNT PAIN NOTED NONE PRSNT: CPT | Performed by: NURSE PRACTITIONER

## 2024-12-06 NOTE — ASSESSMENT & PLAN NOTE
Stable, continue losartan 100 mg 1 tab daily.  Continue to eat a heart healthy low fat low sugar diet, drink plenty of water throughout the day and stay active 3 times a week

## 2024-12-06 NOTE — ASSESSMENT & PLAN NOTE
Continue calcium/vitamin D supplement, recommend adequate daily protein intake, recommend daily weight bearing activities/exercises to strengthen bones, limit alcohol intake and recheck Dexa scan in 2-3 years.

## 2024-12-06 NOTE — PROGRESS NOTES
Subjective   The ABCs of the Annual Wellness Visit  Medicare Wellness Visit      Jody Simmons is a 78 y.o. patient who presents for a Medicare Wellness Visit.    The following portions of the patient's history were reviewed and   updated as appropriate: allergies, current medications, past family history, past medical history, past social history, past surgical history, and problem list.    Compared to one year ago, the patient's physical   health is the same.  Compared to one year ago, the patient's mental   health is the same.    Recent Hospitalizations:  This patient has had a [facility] admission record on file within the last 365 days.  Current Medical Providers:  Patient Care Team:  Manolo Reddy MD as PCP - General (Internal Medicine)  Manolo Reddy MD as Referring Physician (Internal Medicine)  Carlos Travis MD as Consulting Physician (Hematology and Oncology)    Outpatient Medications Prior to Visit   Medication Sig Dispense Refill    acetaminophen (TYLENOL) 325 MG tablet Take 2 tablets by mouth Every 6 (Six) Hours As Needed for Mild Pain.      atorvastatin (LIPITOR) 20 MG tablet Take 1 tablet by mouth Daily. 90 tablet 3    Calcium Carbonate-Vitamin D (CALTRATE 600+D PO) Take  by mouth.      Cholecalciferol (VITAMIN D) 1000 UNITS tablet Take 1 tablet by mouth Daily.      Glucosamine-Chondroit-Vit C-Mn (GLUCOSAMINE CHONDR 1500 COMPLX PO)       losartan (COZAAR) 100 MG tablet Take 1 tablet by mouth Daily. 90 tablet 0    Multiple Vitamin (MULTI VITAMIN DAILY PO) Take 1 tablet by mouth Daily.       No facility-administered medications prior to visit.     No opioid medication identified on active medication list. I have reviewed chart for other potential  high risk medication/s and harmful drug interactions in the elderly.      Aspirin is not on active medication list.  Aspirin use is not indicated based on review of current medical condition/s. Risk of harm outweighs potential benefits.   ".    Patient Active Problem List   Diagnosis    Hyperglycemia    Mixed hyperlipidemia    Osteopenia    Mild vitamin D deficiency    Dysesthesia    Essential hypertension    Chronic left-sided headaches    Dizziness    Balance disorder    Acute otitis media    Cough    Rash    Edema    Serous otitis media    Obesity (BMI 30.0-34.9)    Prediabetes    Ductal carcinoma in situ (DCIS) of right breast    Ductal carcinoma in situ (DCIS) of left breast    Primary osteoarthritis of right knee    Leg edema, right    Medicare annual wellness visit, subsequent    Elevated alkaline phosphatase level    Hypercholesterolemia    Keratoconjunctivitis sicca not specified as Sjogren's    Bilateral nonexudative age-related macular degeneration    Blepharitis    Nuclear senile cataract    Vitreous degeneration     Advance Care Planning Advance Directive is on file.  ACP discussion was held with the patient during this visit. Patient has an advance directive in EMR which is still valid.             Objective   Vitals:    12/06/24 1453   BP: 122/74   BP Location: Right arm   Patient Position: Sitting   Cuff Size: Adult   Pulse: 78   Resp: 17   Temp: 98.2 °F (36.8 °C)   TempSrc: Temporal   SpO2: 95%   Weight: 74.4 kg (164 lb)   Height: 152.4 cm (60\")   PainSc: 0-No pain       Estimated body mass index is 32.03 kg/m² as calculated from the following:    Height as of this encounter: 152.4 cm (60\").    Weight as of this encounter: 74.4 kg (164 lb).    BMI is >= 30 and <35. (Class 1 Obesity). The following options were offered after discussion;: exercise counseling/recommendations and nutrition counseling/recommendations       Does the patient have evidence of cognitive impairment? No  Recent Results (from the past 4 weeks)   CBC & Differential    Collection Time: 11/18/24  9:42 AM    Specimen: Blood   Result Value Ref Range    WBC 5.30 3.40 - 10.80 10*3/mm3    RBC 4.50 3.77 - 5.28 10*6/mm3    Hemoglobin 15.2 12.0 - 15.9 g/dL    Hematocrit " 43.7 34.0 - 46.6 %    MCV 97.1 (H) 79.0 - 97.0 fL    MCH 33.8 (H) 26.6 - 33.0 pg    MCHC 34.8 31.5 - 35.7 g/dL    RDW 12.3 12.3 - 15.4 %    Platelets 232 140 - 450 10*3/mm3    Neutrophil Rel % 56.2 42.7 - 76.0 %    Lymphocyte Rel % 27.2 19.6 - 45.3 %    Monocyte Rel % 12.6 (H) 5.0 - 12.0 %    Eosinophil Rel % 3.2 0.3 - 6.2 %    Basophil Rel % 0.6 0.0 - 1.5 %    Neutrophils Absolute 2.98 1.70 - 7.00 10*3/mm3    Lymphocytes Absolute 1.44 0.70 - 3.10 10*3/mm3    Monocytes Absolute 0.67 0.10 - 0.90 10*3/mm3    Eosinophils Absolute 0.17 0.00 - 0.40 10*3/mm3    Basophils Absolute 0.03 0.00 - 0.20 10*3/mm3    Immature Granulocyte Rel % 0.2 0.0 - 0.5 %    Immature Grans Absolute 0.01 0.00 - 0.05 10*3/mm3    nRBC 0.0 0.0 - 0.2 /100 WBC   Comprehensive Metabolic Panel    Collection Time: 11/18/24  9:42 AM    Specimen: Blood   Result Value Ref Range    Glucose 110 (H) 65 - 99 mg/dL    BUN 14 8 - 23 mg/dL    Creatinine 0.84 0.57 - 1.00 mg/dL    EGFR Result 71.7 >60.0 mL/min/1.73    BUN/Creatinine Ratio 16.7 7.0 - 25.0    Sodium 139 136 - 145 mmol/L    Potassium 4.5 3.5 - 5.2 mmol/L    Chloride 103 98 - 107 mmol/L    Total CO2 26.0 22.0 - 29.0 mmol/L    Calcium 9.2 8.6 - 10.5 mg/dL    Total Protein 6.5 6.0 - 8.5 g/dL    Albumin 4.4 3.5 - 5.2 g/dL    Globulin 2.1 gm/dL    A/G Ratio 2.1 g/dL    Total Bilirubin 0.5 0.0 - 1.2 mg/dL    Alkaline Phosphatase 154 (H) 39 - 117 U/L    AST (SGOT) 29 1 - 32 U/L    ALT (SGPT) 32 1 - 33 U/L   Hemoglobin A1c    Collection Time: 11/18/24  9:42 AM    Specimen: Blood   Result Value Ref Range    Hemoglobin A1C 5.90 (H) 4.80 - 5.60 %   Lipid Panel With LDL / HDL Ratio    Collection Time: 11/18/24  9:42 AM    Specimen: Blood   Result Value Ref Range    Total Cholesterol 166 0 - 200 mg/dL    Triglycerides 96 0 - 150 mg/dL    HDL Cholesterol 41 40 - 60 mg/dL    VLDL Cholesterol Ciro 18 5 - 40 mg/dL    LDL Chol Calc (NIH) 107 (H) 0 - 100 mg/dL    LDL/HDL RATIO 2.58    Urinalysis With Microscopic If  Indicated (No Culture) - Urine, Clean Catch    Collection Time: 24  9:42 AM    Specimen: Urine, Clean Catch   Result Value Ref Range    Specific Gravity, UA 1.011 1.005 - 1.030    pH, UA 6.0 5.0 - 8.0    Color, UA Yellow     Appearance, UA Clear Clear    Leukocytes, UA Negative Negative    Protein Negative Negative    Glucose, UA Negative Negative    Ketones Negative Negative    Blood, UA Negative Negative    Bilirubin, UA Negative Negative    Urobilinogen, UA Comment     Nitrite, UA Negative Negative                                                                                                 Health  Risk Assessment    Smoking Status:  Social History     Tobacco Use   Smoking Status Never    Passive exposure: Never   Smokeless Tobacco Never     Alcohol Consumption:  Social History     Substance and Sexual Activity   Alcohol Use Yes    Alcohol/week: 1.0 standard drink of alcohol    Types: 1 Glasses of wine per week    Comment: SOCIAL rare       Fall Risk Screen  STEADI Fall Risk Assessment was completed, and patient is at LOW risk for falls.Assessment completed on:2024    Depression Screening   Little interest or pleasure in doing things? Not at all   Feeling down, depressed, or hopeless? Not at all   PHQ-2 Total Score 0      Health Habits and Functional and Cognitive Screenin/6/2024     2:49 PM   Functional & Cognitive Status   Do you have difficulty preparing food and eating? No   Do you have difficulty bathing yourself, getting dressed or grooming yourself? No   Do you have difficulty using the toilet? No   Do you have difficulty moving around from place to place? No   Do you have trouble with steps or getting out of a bed or a chair? No   Current Diet Well Balanced Diet   Dental Exam Up to date   Eye Exam Up to date   Exercise (times per week) 3 times per week   Current Exercises Include Walking   Do you need help using the phone?  No   Are you deaf or do you have serious difficulty  hearing?  No   Do you need help to go to places out of walking distance? No   Do you need help shopping? No   Do you need help preparing meals?  No   Do you need help with housework?  No   Do you need help with laundry? No   Do you need help taking your medications? No   Do you need help managing money? No   Do you ever drive or ride in a car without wearing a seat belt? No   Have you felt unusual stress, anger or loneliness in the last month? No   Who do you live with? Alone   If you need help, do you have trouble finding someone available to you? No   Have you been bothered in the last four weeks by sexual problems? No   Do you have difficulty concentrating, remembering or making decisions? No           Age-appropriate Screening Schedule:  Refer to the list below for future screening recommendations based on patient's age, sex and/or medical conditions. Orders for these recommended tests are listed in the plan section. The patient has been provided with a written plan.    Health Maintenance List  Health Maintenance   Topic Date Due    RSV Vaccine - Adults (1 - 1-dose 75+ series) Never done    BMI FOLLOWUP  03/10/2024    COVID-19 Vaccine (4 - 2024-25 season) 09/01/2024    LIPID PANEL  11/18/2025    ANNUAL WELLNESS VISIT  12/06/2025    DXA SCAN  02/01/2026    COLORECTAL CANCER SCREENING  04/03/2028    TDAP/TD VACCINES (2 - Td or Tdap) 04/18/2029    HEPATITIS C SCREENING  Completed    INFLUENZA VACCINE  Completed    Pneumococcal Vaccine 65+  Completed    ZOSTER VACCINE  Completed    MAMMOGRAM  Discontinued                                                                                                                                                CMS Preventative Services Quick Reference  Risk Factors Identified During Encounter  Immunizations Discussed/Encouraged: Influenza, COVID19, and RSV (Respiratory Syncytial Virus)  Dental Screening Recommended  Vision Screening Recommended    The above risks/problems have  "been discussed with the patient.  Pertinent information has been shared with the patient in the After Visit Summary.  An After Visit Summary and PPPS were made available to the patient.    Follow Up:   Next Medicare Wellness visit to be scheduled in 1 year.         Additional E&M Note during same encounter follows:  Patient has additional, significant, and separately identifiable condition(s)/problem(s) that require work above and beyond the Medicare Wellness Visit     Chief Complaint  Medicare Wellness-subsequent and Results (Labs fup )    Subjective   HPI  Jody is also being seen today for additional medical problem/s.                Objective   Vital Signs:  /74 (BP Location: Right arm, Patient Position: Sitting, Cuff Size: Adult)   Pulse 78   Temp 98.2 °F (36.8 °C) (Temporal)   Resp 17   Ht 152.4 cm (60\")   Wt 74.4 kg (164 lb)   SpO2 95%   BMI 32.03 kg/m²   Physical Exam  Vitals reviewed.   HENT:      Head: Normocephalic.   Eyes:      Pupils: Pupils are equal, round, and reactive to light.   Cardiovascular:      Rate and Rhythm: Normal rate and regular rhythm.      Pulses: Normal pulses.   Pulmonary:      Effort: Pulmonary effort is normal.      Breath sounds: Normal breath sounds.   Musculoskeletal:         General: Normal range of motion.      Right lower leg: Edema (trace) present.      Left lower leg: Edema (trace) present.   Skin:     General: Skin is warm and dry.   Neurological:      Mental Status: She is alert and oriented to person, place, and time.   Psychiatric:         Behavior: Behavior normal.         The following data was reviewed by: BELINDA Corona on 12/06/2024:  labs      Assessment and Plan            Medicare annual wellness visit, subsequent         Mixed hyperlipidemia   , LDL goal ~70, atorvastatin increased to 20 mg daily; recheck lipid panel in 6 months.          Essential hypertension  Stable, continue losartan 100 mg 1 tab daily.  Continue to eat a heart " healthy low fat low sugar diet, drink plenty of water throughout the day and stay active 3 times a week         Prediabetes  HgA1c 5.9; continue to work on lifestyle modifications       Osteopenia, unspecified location  Continue calcium/vitamin D supplement, recommend adequate daily protein intake, recommend daily weight bearing activities/exercises to strengthen bones, limit alcohol intake and recheck Dexa scan in 2-3 years.        Need for influenza vaccination    Orders:    Fluzone High-Dose 65+yrs ([id number])            Follow Up   Return for fasting labs in 6 months, recheck in 6 months.  Patient was given instructions and counseling regarding her condition or for health maintenance advice. Please see specific information pulled into the AVS if appropriate.

## 2024-12-16 ENCOUNTER — OFFICE VISIT (OUTPATIENT)
Dept: ORTHOPEDIC SURGERY | Facility: CLINIC | Age: 79
End: 2024-12-16
Payer: MEDICARE

## 2024-12-16 VITALS — WEIGHT: 164 LBS | TEMPERATURE: 98.2 F | HEIGHT: 60 IN | BODY MASS INDEX: 32.2 KG/M2

## 2024-12-16 DIAGNOSIS — G89.29 CHRONIC PAIN OF BOTH KNEES: ICD-10-CM

## 2024-12-16 DIAGNOSIS — M25.562 CHRONIC PAIN OF BOTH KNEES: ICD-10-CM

## 2024-12-16 DIAGNOSIS — M25.561 CHRONIC PAIN OF BOTH KNEES: ICD-10-CM

## 2024-12-16 DIAGNOSIS — M17.0 PRIMARY OSTEOARTHRITIS OF BOTH KNEES: Primary | ICD-10-CM

## 2024-12-16 PROCEDURE — 1160F RVW MEDS BY RX/DR IN RCRD: CPT | Performed by: NURSE PRACTITIONER

## 2024-12-16 PROCEDURE — 20610 DRAIN/INJ JOINT/BURSA W/O US: CPT | Performed by: NURSE PRACTITIONER

## 2024-12-16 PROCEDURE — 73562 X-RAY EXAM OF KNEE 3: CPT | Performed by: NURSE PRACTITIONER

## 2024-12-16 PROCEDURE — 99213 OFFICE O/P EST LOW 20 MIN: CPT | Performed by: NURSE PRACTITIONER

## 2024-12-16 PROCEDURE — 1159F MED LIST DOCD IN RCRD: CPT | Performed by: NURSE PRACTITIONER

## 2024-12-16 RX ORDER — LIDOCAINE HYDROCHLORIDE 10 MG/ML
2 INJECTION, SOLUTION EPIDURAL; INFILTRATION; INTRACAUDAL; PERINEURAL
Status: COMPLETED | OUTPATIENT
Start: 2024-12-16 | End: 2024-12-16

## 2024-12-16 RX ORDER — IBUPROFEN 400 MG/1
400 TABLET, FILM COATED ORAL EVERY 6 HOURS PRN
COMMUNITY

## 2024-12-16 RX ORDER — METHYLPREDNISOLONE ACETATE 80 MG/ML
80 INJECTION, SUSPENSION INTRA-ARTICULAR; INTRALESIONAL; INTRAMUSCULAR; SOFT TISSUE
Status: COMPLETED | OUTPATIENT
Start: 2024-12-16 | End: 2024-12-16

## 2024-12-16 RX ADMIN — METHYLPREDNISOLONE ACETATE 80 MG: 80 INJECTION, SUSPENSION INTRA-ARTICULAR; INTRALESIONAL; INTRAMUSCULAR; SOFT TISSUE at 13:54

## 2024-12-16 RX ADMIN — LIDOCAINE HYDROCHLORIDE 2 ML: 10 INJECTION, SOLUTION EPIDURAL; INFILTRATION; INTRACAUDAL; PERINEURAL at 13:54

## 2024-12-16 NOTE — PROGRESS NOTES
[facility]              New Problem      Patient Name: Jody Simmons  : 1945  Primary Care Physician: Manolo Reddy MD        Chief Complaint:  Bilateral knee pain    HPI:   Jody Simmons is a 79 y.o. year old who presents today for evaluation.  History of Present Illness  The patient presents for evaluation of bilateral knee pain.    She has been under the care of Dr. Echeverria for her right knee since , receiving 2 to 3 cortisone injections and undergoing physical therapy. She was advised to take glucosamine by her brother. Despite these interventions, she was informed of cartilage deterioration in her knee.  She received an injection in 2023, which provided relief until recently. She has been cautious with stairs since the last injection and does not recall any side effects from the injection. She reports no liver or kidney issues and typically takes Tylenol, occasionally some ibuprofen. She has a history of osteopenia but reports no fractures.    Her left knee has been asymptomatic until an incident 10 days ago when she experienced sudden pain while lifting her leg to enter her car. She describes the pain as muscular and speculates it may be related to the meniscus. The pain is intermittent and exacerbated by certain movements. She also reports tingling in the front of her knee, which does not radiate down the leg. She recalls doing yard work prior to the onset of pain but did not experience any discomfort during the activity. She has been managing the pain with ice and ibuprofen, initially taking 2 tablets and increasing to 3, but reports no relief with Tylenol or heat application.       MEDICATIONS  Current: ibuprofen, Tylenol, glucosamine    Past Medical/Surgical, Social and Family History:  I have reviewed and/or updated pertinent history as noted in the medical record including:  Past Medical History:   Diagnosis Date    Bilateral malignant neoplasm of overlapping sites of breast  in female 02/28/2022    Dyslipidemia     Endometrial polyp 04/21/2015    benign     Fibroid     Frozen shoulder 1980s    H/O pelvic mass     Hyperlipidemia     Hypertension yrs ago    Osteopenia     PMB (postmenopausal bleeding)     Primary osteoarthritis of right knee 03/11/2022    Screen for colon cancer 2021    Cologuard: negative result    UTI (urinary tract infection)     Visual impairment Yrs ago    Glasses     Past Surgical History:   Procedure Laterality Date    BREAST BIOPSY Bilateral 02/18/2022    STEROTACTIC BREAST BIOPSY; Left Breast Site #1 Outer Middle bowtie biopsy clip; Left Breast Site #2 12:00 position barbell clip deployed; Right Breast Upper Inner Posterior Bowtie clip deployed;    COLONOSCOPY N/A 2009    HYSTERECTOMY  2015    OOPHORECTOMY      SKIN LESION EXCISION      excision right post. calf (benign for melaoma)     Social History     Occupational History    Occupation: Teacher     Employer: RETIRED   Tobacco Use    Smoking status: Never     Passive exposure: Never    Smokeless tobacco: Never   Vaping Use    Vaping status: Never Used   Substance and Sexual Activity    Alcohol use: Yes     Alcohol/week: 1.0 standard drink of alcohol     Types: 1 Glasses of wine per week     Comment: SOCIAL rare    Drug use: No    Sexual activity: Not Currently     Partners: Male     Birth control/protection: Post-menopausal          Allergies: No Known Allergies    Medications:   Home Medications:  Current Outpatient Medications on File Prior to Visit   Medication Sig    acetaminophen (TYLENOL) 325 MG tablet Take 2 tablets by mouth Every 6 (Six) Hours As Needed for Mild Pain.    atorvastatin (LIPITOR) 20 MG tablet Take 1 tablet by mouth Daily.    Calcium Carbonate-Vitamin D (CALTRATE 600+D PO) Take  by mouth.    Cholecalciferol (VITAMIN D) 1000 UNITS tablet Take 1 tablet by mouth Daily.    Glucosamine-Chondroit-Vit C-Mn (GLUCOSAMINE CHONDR 1500 COMPLX PO)     ibuprofen (ADVIL,MOTRIN) 400 MG tablet Take 1  tablet by mouth Every 6 (Six) Hours As Needed for Mild Pain.    losartan (COZAAR) 100 MG tablet Take 1 tablet by mouth Daily.    Multiple Vitamin (MULTI VITAMIN DAILY PO) Take 1 tablet by mouth Daily.     No current facility-administered medications on file prior to visit.         ROS:  ROS negative except as listed in the HPI.    Physical Exam:   79 y.o. female  Body mass index is 32.03 kg/m²., 74.4 kg (164 lb)  Vitals:    12/16/24 1321   Temp: 98.2 °F (36.8 °C)     General: Alert, cooperative, appears well and in no observable distress. Appears stated age and BMI as listed above.  HEENT: Normocephalic, atraumatic on external visual inspection.  CV: No significant peripheral edema.  Respiratory: Normal respiratory effort.  Skin: Warm & well perfused; appropriate skin turgor.  Psych: Appropriate mood & affect.  Neuro: Gross sensation and motor intact in affected extremity/extremities.  Vascular: Peripheral pulses palpable in affected extremity/extremities.   Physical Exam      MSK Exam:    Knee Exam:    Left   No deformity or wounds appreciated. No significant redness or warmth.  Trace effusion noted.  Tenderness along the joint line appreciated posterior/medial.  ROM 2-120 with pain at terminal motion.  Ligamentous exam grossly stable.  Donna equivocal  Bounce Home -  Quad strength 4-4+/5    Right   No deformity or wounds appreciated. No significant redness or warmth.  No significant effusion noted.  Tenderness appreciated about the joint along the medial compartment.  ROM 0-130 and minimal discomfort.  Ligamentous exam grossly stable.  Quad strength 4+ /5.    Brief hip exam in the affected extremity(ies) grossly unremarkable.  Moves ankle and toes up and down, no significant pain or swelling in the foot, ankle or calf.        Radiology:    The following X-rays were ordered/reviewed today to evaluate the patient's symptoms: Bilateral Knee: AP standing, lateral, and sunrise of both knees show degenerative changes  R>L with significant medial and patellofemoral OA changes on the right, left knee has some mild joint space narrowing medial and PF. No obvious fracture or other bony pathology to account for symptoms. I do not see any significant interval progression of arthritis in the R knee when compared with films from last year.  Results      Procedure:   See Procedure Note: The potential risks and benefits of performing a diagnostic and therapeutic injection were discussed with the patient prior to procedure. Risks include, but are not limited to infection, swelling, transient increase in pain, bleeding, bruising. Patient was advised that injections are a diagnostic and therapeutic tool meaning they may not alleviate symptoms at all, or may only provide partial or temporary relief. Injection precautions and aftercare discussed.      Large Joint Arthrocentesis: L knee  Date/Time: 12/16/2024 1:54 PM  Consent given by: patient  Site marked: site marked  Timeout: Immediately prior to procedure a time out was called to verify the correct patient, procedure, equipment, support staff and site/side marked as required   Supporting Documentation  Indications: pain and joint swelling   Procedure Details  Location: knee - L knee  Preparation: Patient was prepped and draped in the usual sterile fashion  Needle size: 22 G  Approach: anterolateral  Medications administered: 80 mg methylPREDNISolone acetate 80 MG/ML; 2 mL lidocaine PF 1% 1 %  Patient tolerance: patient tolerated the procedure well with no immediate complications           Misc. Data/Labs: N/A    Assessment & Plan:      ICD-10-CM ICD-9-CM   1. Primary osteoarthritis of both knees  M17.0 715.16   2. Chronic pain of both knees  M25.561 719.46    M25.562 338.29    G89.29      No orders of the defined types were placed in this encounter.    Orders Placed This Encounter   Procedures    Large Joint Arthrocentesis: L knee    XR Knee 3 View Bilateral       Assessment & Plan  1.  Bilateral knee pain.  The left knee currently exhibits more severe symptoms than the right, with a potential meniscus tear contributing to the discomfort. The left knee also shows signs of arthritis, albeit less severe than the right. Could also be soft tissue related, bakers cyst, etc.  The patient reports that Tylenol has not been effective, and she has been using ibuprofen sparingly. A corticosteroid injection will be administered to the left knee today. She is advised to continue with ice application and ibuprofen for a duration of 10 days, ensuring it is taken with food. Concurrent use of Tylenol is permissible if necessary. Physical therapy may be considered post-holiday season. I will see her back in 2-3 weeks and we will see how things are going. We might consider a R knee injection at that time if needed.     Follow-up  The patient will follow up in 3 weeks.    PROCEDURE  The patient received a cortisone injection in August 2023, which provided relief until recently.    Today, a corticosteroid injection was administered to the left knee.    Continue with activity modifications as needed/discussed.  Continue ICE and/or HEAT PRN.  Recommend to continue activity as tolerated, focus on quad and hip/core strengthening as well as gentle stretching.  Recommend lowering or maintaining BMI within a healthy range to reduce symptoms.   Patient encouraged to call with questions or concerns prior to follow up.  Will discuss with attending as needed.   Consider additional referrals, work up and/or advanced imaging as indicated or if patient fails to respond to conservative care.    Return in about 2 weeks (around 12/30/2024) for Recheck. If symptoms change call for sooner appt..    Patient or patient representative verbalized consent for the use of Ambient Listening during the visit with  BELINDA Mitchell for chart documentation. 12/16/2024  14:35 EST        BELINDA Gonsalez    Dictation software was used to  complete a portion or all of this note.

## 2025-01-13 RX ORDER — LOSARTAN POTASSIUM 100 MG/1
100 TABLET ORAL DAILY
Qty: 90 TABLET | Refills: 0 | Status: SHIPPED | OUTPATIENT
Start: 2025-01-13

## 2025-02-03 RX ORDER — ATORVASTATIN CALCIUM 20 MG/1
20 TABLET, FILM COATED ORAL DAILY
Qty: 90 TABLET | Refills: 3 | Status: SHIPPED | OUTPATIENT
Start: 2025-02-03

## 2025-03-17 ENCOUNTER — OFFICE VISIT (OUTPATIENT)
Dept: FAMILY MEDICINE CLINIC | Facility: CLINIC | Age: 80
End: 2025-03-17
Payer: MEDICARE

## 2025-03-17 VITALS
BODY MASS INDEX: 32.57 KG/M2 | DIASTOLIC BLOOD PRESSURE: 78 MMHG | SYSTOLIC BLOOD PRESSURE: 136 MMHG | HEIGHT: 60 IN | RESPIRATION RATE: 16 BRPM | WEIGHT: 165.9 LBS | HEART RATE: 71 BPM | TEMPERATURE: 96.5 F | OXYGEN SATURATION: 95 %

## 2025-03-17 DIAGNOSIS — R73.03 PREDIABETES: ICD-10-CM

## 2025-03-17 DIAGNOSIS — E78.00 HYPERCHOLESTEROLEMIA: ICD-10-CM

## 2025-03-17 DIAGNOSIS — M25.561 CHRONIC PAIN OF BOTH KNEES: Primary | ICD-10-CM

## 2025-03-17 DIAGNOSIS — I10 ESSENTIAL HYPERTENSION: ICD-10-CM

## 2025-03-17 DIAGNOSIS — M25.562 CHRONIC PAIN OF BOTH KNEES: Primary | ICD-10-CM

## 2025-03-17 DIAGNOSIS — G89.29 CHRONIC PAIN OF BOTH KNEES: Primary | ICD-10-CM

## 2025-03-17 DIAGNOSIS — M17.11 PRIMARY OSTEOARTHRITIS OF RIGHT KNEE: ICD-10-CM

## 2025-03-17 PROCEDURE — 99214 OFFICE O/P EST MOD 30 MIN: CPT | Performed by: INTERNAL MEDICINE

## 2025-03-17 PROCEDURE — 1125F AMNT PAIN NOTED PAIN PRSNT: CPT | Performed by: INTERNAL MEDICINE

## 2025-03-17 PROCEDURE — 3078F DIAST BP <80 MM HG: CPT | Performed by: INTERNAL MEDICINE

## 2025-03-17 PROCEDURE — 3075F SYST BP GE 130 - 139MM HG: CPT | Performed by: INTERNAL MEDICINE

## 2025-03-17 RX ORDER — LOSARTAN POTASSIUM 100 MG/1
100 TABLET ORAL DAILY
Qty: 90 TABLET | Refills: 0 | Status: SHIPPED | OUTPATIENT
Start: 2025-03-17

## 2025-03-18 NOTE — PROGRESS NOTES
Subjective   Jody Simmons is a 79 y.o. female. Patient is here today for   Chief Complaint   Patient presents with    Hypertension        History of Present Illness  Here today to review labs from last week.  Hypertension      Follow up to lab work  Symptoms are: recurrent.   Onset was 1 to 6 months.   Symptoms occur: intermittently.  Treatment and/or Medications comments include: Visits to Dr Echeverria   Additional information: Take glucosamine    History of Present Illness  The patient presents for evaluation of hypertriglyceridemia, osteoarthritis, breast pain, and elevated alkaline phosphatase.    She has been on an increased dose of atorvastatin, now at 20 mg, with the expectation of improved lipid profile. However, her triglyceride levels have shown an upward trend, increasing from 96 in November 2024 to 151 in the most recent test. She reports no changes in her diet and is not experiencing any muscle aches or pains as a side effect of the medication.    She has a history of knee injury and has been under the care of Dr. Echeverria, receiving steroid injections intermittently. During a follow-up visit in November 2024, she reported mild knee pain and was advised to monitor the condition. She recalls an incident where her knee gave way while stepping up into her car, resulting in temporary weakness. She has been managing her symptoms with heat application and ibuprofen 200 mg twice daily. In December 2024, she received another steroid injection from Dr. Echeverria's assistant, who recommended a follow-up in January 2025 if the injection proved ineffective. She experiences intermittent pain in both knees, which she attributes to arthritis. She has been attempting to increase her physical activity through walking and believes she has lost 5 pounds. She was advised to consider physical therapy due to suspected calcium deterioration and the onset of a bone spur in her left knee. She has not yet initiated therapy as she  wanted to discuss it with us first. She typically walks around [place] community [place] but had to discontinue this activity due to a fall and subsequent COVID-19 infection. She has since resumed walking and can now complete two laps around the park, although she occasionally has to stop due to pain. She underwent x-rays of both knees and declined a steroid injection in her right knee. She has not been advised to undergo knee surgery.    She occasionally experiences pain along her rib when reaching for objects or lifting her cat. She has discussed this with her oncologist, Dr. Lancaster, who suggested it could be a side effect of radiation therapy. She has a scheduled appointment with Dr. Lancaster in 3 weeks.    She has expressed concern about an abnormality in her CBC results, specifically an increase in immature granulocytes from 0.2 to 1.2. She is curious if this could be due to an infection, inflammation, or steroid use. She also reports inflammation in her ankles and is wondering if that could trigger this. She has reduced her calcium intake due to concerns about potential heart risks in older women. She is not currently taking any medication for osteopenia. She is scheduled for a mammogram in a month and plans to have lab work done a week prior to her next appointment in July 2025.    MEDICATIONS  atorvastatin, ibuprofen      Vitals:    03/17/25 1029   BP: 136/78   Pulse: 71   Resp: 16   Temp: 96.5 °F (35.8 °C)   SpO2: 95%     Body mass index is 32.4 kg/m².    Past Medical History:   Diagnosis Date    Bilateral malignant neoplasm of overlapping sites of breast in female 02/28/2022    Dyslipidemia     Endometrial polyp 04/21/2015    benign     Fibroid     Frozen shoulder 1980s    H/O pelvic mass     Hyperlipidemia     Hypertension yrs ago    Osteopenia     PMB (postmenopausal bleeding)     Primary osteoarthritis of right knee 03/11/2022    Screen for colon cancer 2021    Cologuard: negative result    UTI  (urinary tract infection)     Visual impairment Yrs ago    Glasses      No Known Allergies   Social History     Socioeconomic History    Marital status: Single   Tobacco Use    Smoking status: Never     Passive exposure: Never    Smokeless tobacco: Never   Vaping Use    Vaping status: Never Used   Substance and Sexual Activity    Alcohol use: Yes     Alcohol/week: 1.0 standard drink of alcohol     Types: 1 Glasses of wine per week     Comment: SOCIAL rare    Drug use: No    Sexual activity: Not Currently     Partners: Male     Birth control/protection: Post-menopausal        Current Outpatient Medications:     acetaminophen (TYLENOL) 325 MG tablet, Take 2 tablets by mouth Every 6 (Six) Hours As Needed for Mild Pain., Disp: , Rfl:     atorvastatin (LIPITOR) 20 MG tablet, Take 1 tablet by mouth Daily., Disp: 90 tablet, Rfl: 3    Calcium Carbonate-Vitamin D (CALTRATE 600+D PO), Take  by mouth., Disp: , Rfl:     Carboxymethylcellulose Sodium (EYE DROPS OP), Apply  to eye(s) as directed by provider., Disp: , Rfl:     Cholecalciferol (VITAMIN D) 1000 UNITS tablet, Take 1 tablet by mouth Daily., Disp: , Rfl:     Glucosamine-Chondroit-Vit C-Mn (GLUCOSAMINE CHONDR 1500 COMPLX PO), , Disp: , Rfl:     ibuprofen (ADVIL,MOTRIN) 400 MG tablet, Take 1 tablet by mouth Every 6 (Six) Hours As Needed for Mild Pain., Disp: , Rfl:     losartan (COZAAR) 100 MG tablet, Take 1 tablet by mouth Daily., Disp: 90 tablet, Rfl: 0    Multiple Vitamin (MULTI VITAMIN DAILY PO), Take 1 tablet by mouth Daily., Disp: , Rfl:     multivitamin with minerals (MULTIVITAMIN ADULT PO), Take 1 tablet by mouth Daily. PRESERVATION AREDS, Disp: , Rfl:      Objective     Review of Systems   Constitutional: Negative.    HENT: Negative.     Respiratory: Negative.     Cardiovascular: Negative.    Musculoskeletal:         She has chronic pain of both knees from her degenerative arthritis.   Psychiatric/Behavioral: Negative.         Physical Exam  Vitals and nursing  note reviewed.   Constitutional:       Appearance: Normal appearance.      Comments: Pleasant, neatly groomed, no distress.   Cardiovascular:      Rate and Rhythm: Regular rhythm.      Heart sounds: Normal heart sounds. No murmur heard.     No gallop.   Pulmonary:      Effort: No respiratory distress.      Breath sounds: Normal breath sounds. No wheezing.   Neurological:      Mental Status: She is alert and oriented to person, place, and time.   Psychiatric:         Mood and Affect: Mood normal.         Behavior: Behavior normal.         Thought Content: Thought content normal.       Physical Exam  Lungs were auscultated.    Results  Laboratory Studies  Triglycerides were 151. LDL cholesterol is 88. Alkaline phosphatase is elevated. Immature granulocyte count increased from 0.2 to 1.2. MCV was 97. Mean corpuscular hemoglobin was 33.8 in November 2024 and 32.7 in July 2024. Vitamin D level is 41.    Assessment & Plan    Problems Addressed this Visit          Cardiac and Vasculature    Essential hypertension    Hypercholesterolemia       Endocrine and Metabolic    Prediabetes       Musculoskeletal and Injuries    Primary osteoarthritis of right knee     Other Visit Diagnoses         Chronic pain of both knees    -  Primary    Relevant Orders    Ambulatory Referral to Physical Therapy for Evaluation & Treatment          Diagnoses         Codes Comments      Chronic pain of both knees    -  Primary ICD-10-CM: M25.561, M25.562, G89.29  ICD-9-CM: 719.46, 338.29       Hypercholesterolemia     ICD-10-CM: E78.00  ICD-9-CM: 272.0       Essential hypertension     ICD-10-CM: I10  ICD-9-CM: 401.9       Prediabetes     ICD-10-CM: R73.03  ICD-9-CM: 790.29       Primary osteoarthritis of right knee     ICD-10-CM: M17.11  ICD-9-CM: 715.16           Assessment & Plan  1. Hypertriglyceridemia.  Her triglyceride levels have increased from 96 in November 2024 to 151 in the most recent test. The atorvastatin is not responsible for this  elevation. Her LDL cholesterol has improved from 107 to 88. She was informed that consumption of fatty foods can elevate triglyceride levels.    2. Osteoarthritis.  She has been diagnosed with osteoarthritis in both knees, which is the likely cause of her knee pain. She was advised to discontinue walking if it exacerbates her pain. A referral for physical therapy has been made to help manage her symptoms.    3. Post-breast surgery pain.  She experiences pain along her rib when reaching for objects or lifting her cat. This pain may be a side effect of radiation therapy. She will discuss this further with her oncologist, Dr. Lancaster, in about 3 weeks.    4. Elevated alkaline phosphatase.  Her alkaline phosphatase levels remain elevated. Her vitamin D level is slightly below the normal range, at 41. She was advised to take over-the-counter vitamin D supplements but does not require calcium supplementation.    Follow-up  The patient is scheduled for a follow-up visit in July 2025.    PROCEDURE  The patient has received steroid injections intermittently for knee pain. She also underwent breast surgery.      No follow-ups on file.    Patient or patient representative verbalized consent for the use of Ambient Listening during the visit with  Manolo Reddy MD for chart documentation. 3/18/2025  14:43 EDT

## 2025-03-31 ENCOUNTER — TREATMENT (OUTPATIENT)
Dept: PHYSICAL THERAPY | Facility: CLINIC | Age: 80
End: 2025-03-31
Payer: MEDICARE

## 2025-03-31 DIAGNOSIS — Z74.09 IMPAIRED FUNCTIONAL MOBILITY, BALANCE, GAIT, AND ENDURANCE: ICD-10-CM

## 2025-03-31 DIAGNOSIS — M17.0 PRIMARY OSTEOARTHRITIS OF BOTH KNEES: ICD-10-CM

## 2025-03-31 DIAGNOSIS — M25.561 CHRONIC PAIN OF BOTH KNEES: Primary | ICD-10-CM

## 2025-03-31 DIAGNOSIS — G89.29 CHRONIC PAIN OF BOTH KNEES: Primary | ICD-10-CM

## 2025-03-31 DIAGNOSIS — M25.562 CHRONIC PAIN OF BOTH KNEES: Primary | ICD-10-CM

## 2025-03-31 PROCEDURE — 97110 THERAPEUTIC EXERCISES: CPT | Performed by: PHYSICAL THERAPIST

## 2025-03-31 PROCEDURE — 97530 THERAPEUTIC ACTIVITIES: CPT | Performed by: PHYSICAL THERAPIST

## 2025-03-31 PROCEDURE — 97162 PT EVAL MOD COMPLEX 30 MIN: CPT | Performed by: PHYSICAL THERAPIST

## 2025-03-31 NOTE — PROGRESS NOTES
Physical Therapy Initial Evaluation and Plan of Care  [facility] Physical Therapy [facility]  [address], [address]  [place] [address]    Patient: Jody Simmons   : 1945  Diagnosis/ICD-10 Code:  Chronic pain of both knees [M25.561, M25.562, G89.29]  Referring practitioner: Manolo Reddy MD  Today's Date: 3/31/2025    Subjective Evaluation    History of Present Illness  Mechanism of injury: Reports doing very well after her last series of PT exercises in   Then came down with Covid in late  - was not able to do much and was very immobile as it took her a while to get over it  She has had 2 steroid injections in the right knee - last one in 2023  Left knee had one in 2024  Saw PCP for annual visit and mentioned that sh was having some trouble with her knees and was referred to PT for further strengthening   Feels like the left knee will give out on her  Has had no falls  Walks the park around her house - now only able to walk it once when she used to do it 3 times    Pain  Current pain ratin  At best pain ratin  At worst pain ratin  Location: bilateral anterior medial joiont lines, no patellar pain  Quality: sharp, tight, knife-like, dull ache and discomfort  Relieving factors: ice, heat, medications, rest and support  Aggravating factors: sleeping, ambulation, squatting, stairs, lifting and standing  Progression: no change    Social Support  Lives in: multiple-level home    Diagnostic Tests  X-ray: abnormal    Treatments  Previous treatment: medication and physical therapy  Current treatment: medication and physical therapy  Patient Goals  Patient goal: walk her neighborhood park 2-3 times, also climb steps without pain         Objective          Observations     Additional Knee Observation Details  Walks with short stride length gait pattern  Wearing shoes previously prescribed by this therapist - with orthotics in place    Palpation     Additional  Palpation Details  Considerable creptisu in both knees with extension from a flexed position    Tenderness   Left Knee   Tenderness in the lateral joint line and medial joint line.     Right Knee   Tenderness in the medial joint line.     Neurological Testing     Sensation     Knee   Left Knee   Intact: Light touch    Active Range of Motion   Left Knee   Flexion: 122 degrees   Extension: 4 degrees   Extensor la degrees     Right Knee   Flexion: 109 degrees   Extension: 9 degrees   Extensor la degrees     Strength/Myotome Testing     Left Knee   Flexion: 4+  Extension: 4+  Quadriceps contraction: good    Right Knee   Flexion: 4+  Extension: 4+  Quadriceps contraction: good    Tests     Left Knee   Positive patellar compression and patella-femoral grind.   Negative valgus stress test at 0 degrees and varus stress test at 0 degrees.     Right Knee   Positive patellar compression and patella-femoral grind.   Negative valgus stress test at 0 degrees and varus stress test at 0 degrees.     Left Knee Flexibility Comments:   Decreased piriformis flexibility bilaterally           Assessment & Plan       Assessment  Impairments: abnormal gait, abnormal or restricted ROM, activity intolerance, impaired physical strength, lacks appropriate home exercise program, pain with function and weight-bearing intolerance   Functional limitations: carrying objects, walking, pushing, uncomfortable because of pain, standing, stooping and unable to perform repetitive tasks   Assessment details: Pt is 78 y/o female presenting with:  bilateral knee arthritis  Pt exhibits the following limitations: 1. Pain in bilateral knees, 2. Decreased knee ROM, 3. Decreased LE strength, 4. Tenderness to palpation of bilateral medial joint lines, 5. Normal gait pattern wit short stride length, 6. Decreased tolerance for normal ADL's that include standing and walking     Pt would benefit from skilled therapy in order to address the aforementioned  problems and return to prior level of functioning   Prognosis: good    Goals  Plan Goals: SHORT TERM GOALS: Time for Goal Achievement: 4 weeks    1.  Patient to be compliant with HEP.   2.  Patient will be able to walk on level surfaces for 10 minutes with a normal gait pattern  3.  Patient will be able to sleep without increased symptoms  4.  Patient will have 50% less pain when climbing up/down stairs    LONG TERM GOALS: Time for Goal Achievement: 8 weeks  1.  Patient will be independent in performing current HEP and understand progress ion of the program  2.  Patient will have pain free functional knee AROM  3.  Patient will be able to walk on level surfaces/inclines and up/down stairs with minimal to no increase in symptoms   4.  Pt will be able to walk 2 laps around her neighborhood park without increased symptoms      Plan  Therapy options: will be seen for skilled therapy services  Planned modality interventions: cryotherapy and thermotherapy (hydrocollator packs)  Planned therapy interventions: balance/weight-bearing training, flexibility, functional ROM exercises, gait training, home exercise program, manual therapy, strengthening, stretching, therapeutic activities, joint mobilization, soft tissue mobilization and neuromuscular re-education  Frequency: 2x week  Duration in visits: 16  Duration in weeks: 8  Treatment plan discussed with: patient  Plan details: Access Code: [email or web address]  URL: [email or web address]  Date: 03/31/2025  Prepared by: Ayesha Horowitz    Exercises  - Small Range Straight Leg Raise  - 1 x daily - 7 x weekly - 1 sets - 10 reps - 3 seconds hold  - Clamshell  - 1 x daily - 7 x weekly - 1 sets - 10 reps - 3 seconds hold  - Sidelying Hip Abduction  - 1 x daily - 7 x weekly - 1 sets - 10 reps - 3 seconds hold        Manual Therapy:    0     mins  15590;  Therapeutic Exercise:    15     mins  79085;     Neuromuscular Luis A:    0    mins  49607;    Therapeutic Activity:     10     mins   86798;     Ultrasound                  __0_  mins  17277  Iontophoresis                 0    mins  60040    Electrical Stimulation     0    mins  36406 ([id number])  Traction                         _0  mins  77575     Evaluation Time:     25  mins  Timed Treatment:   25   mins   Total Treatment:     60   mins    PT: Ayesha Horowitz, PT     License Number: [place] [id number]  Electronically signed by Ayesha Horowitz, PT, 03/31/25, 2:41 PM EDT    Certification Period: 3/31/2025 thru 6/28/2025  I certify that the therapy services are furnished while this patient is under my care.  The services outlined above are required by this patient, and will be reviewed every 90 days.         Physician Signature:__________________________________________________    PHYSICIAN: Manolo Reddy MD      DATE:     Please sign and return via fax to [email or web address] ([employer] . Thank you, [facility].    PT SIGNATURE: Ayesha Horowitz PT   [place] LICENSE:  [id number]

## 2025-04-03 NOTE — PROGRESS NOTES
Physical Therapy Daily Treatment Note  4/4/2025  Visit Diagnoses:    ICD-10-CM ICD-9-CM   1. Chronic pain of both knees  M25.561 719.46    M25.562 338.29    G89.29    2. Primary osteoarthritis of both knees  M17.0 715.16   3. Impaired functional mobility, balance, gait, and endurance  Z74.09 V49.89       VISIT#: 2      Jody Simmons reports: She had good and bad days with her knees. When she is doing yard work and things she shouldn't be doing - she pays for it the next day. She does not like sitting in the house. Her pain is intermittent. Her legs do not feel strong     Affected Area: Bilateral anterior medial joint lines, no patellar pain     Functional Deficits/Irritating Factors: sleeping, ambulation, squatting, stairs, lifting and standing   Progression: no significant changes yet      Objective  Presents: Walks with short stride length gait pattern     Added to Program: step ups, piriformis stretch      Tenderness to palpitation in R medial knee  See Exercise, Manual, and Modality Logs for complete treatment.     Patient Education: Pt was educated on exercise biomechanical correctness, intensity, and speed.     Assessment:  Jody presented with palpable tenderness in her R medial knee today. Found STM and KT tape helpful. Minimal cues needed for exercises.  Pt will continue to benefit from skilled PT interventions to address current functional deficits and impairments.     Plan Goals: SHORT TERM GOALS: Time for Goal Achievement: 4 weeks    1.  Patient to be compliant with HEP.   2.  Patient will be able to walk on level surfaces for 10 minutes with a normal gait pattern  3.  Patient will be able to sleep without increased symptoms  4.  Patient will have 50% less pain when climbing up/down stairs     LONG TERM GOALS: Time for Goal Achievement: 8 weeks  1.  Patient will be independent in performing current HEP and understand progress ion of the program  2.  Patient will have pain free functional knee AROM  3.   Patient will be able to walk on level surfaces/inclines and up/down stairs with minimal to no increase in symptoms   4.  Pt will be able to walk 2 laps around her neighborhood park without increased symptoms      Plan: Progress to/Continue with current program.       Timed:  Manual Therapy:            15_    mins  01310;  Ultrasound:                     0      mins  37755;   Therapeutic Exercise:    20     mins  49356;     Neuromuscular Luis A:   0     mins  91105;    Therapeutic Activity:      0     mins  93935;      Iontophoresis              _0__   mins  Dry Needling               _0____   mins         Untimed:  Work Conditioning: __0__ mins 57157  Electrical Stimulation:    0    mins  98368 ( );  Mechanical Traction:       0        mins  10317;   Paraffin                       __0___  mins   Ice/Heat: __0__ mins      Timed Treatment:   30   mins   Total Treatment:     30   mins          Qing Vázquez PTA  [place] License # [id number]  Physical Therapist Assistant

## 2025-04-04 ENCOUNTER — TREATMENT (OUTPATIENT)
Dept: PHYSICAL THERAPY | Facility: CLINIC | Age: 80
End: 2025-04-04
Payer: MEDICARE

## 2025-04-04 DIAGNOSIS — M25.562 CHRONIC PAIN OF BOTH KNEES: Primary | ICD-10-CM

## 2025-04-04 DIAGNOSIS — M25.561 CHRONIC PAIN OF BOTH KNEES: Primary | ICD-10-CM

## 2025-04-04 DIAGNOSIS — Z74.09 IMPAIRED FUNCTIONAL MOBILITY, BALANCE, GAIT, AND ENDURANCE: ICD-10-CM

## 2025-04-04 DIAGNOSIS — M17.0 PRIMARY OSTEOARTHRITIS OF BOTH KNEES: ICD-10-CM

## 2025-04-04 DIAGNOSIS — G89.29 CHRONIC PAIN OF BOTH KNEES: Primary | ICD-10-CM

## 2025-04-07 ENCOUNTER — TREATMENT (OUTPATIENT)
Dept: PHYSICAL THERAPY | Facility: CLINIC | Age: 80
End: 2025-04-07
Payer: MEDICARE

## 2025-04-07 DIAGNOSIS — M17.0 PRIMARY OSTEOARTHRITIS OF BOTH KNEES: ICD-10-CM

## 2025-04-07 DIAGNOSIS — M25.562 CHRONIC PAIN OF BOTH KNEES: Primary | ICD-10-CM

## 2025-04-07 DIAGNOSIS — Z74.09 IMPAIRED FUNCTIONAL MOBILITY, BALANCE, GAIT, AND ENDURANCE: ICD-10-CM

## 2025-04-07 DIAGNOSIS — G89.29 CHRONIC PAIN OF BOTH KNEES: Primary | ICD-10-CM

## 2025-04-07 DIAGNOSIS — M25.561 CHRONIC PAIN OF BOTH KNEES: Primary | ICD-10-CM

## 2025-04-07 PROCEDURE — 97110 THERAPEUTIC EXERCISES: CPT | Performed by: PHYSICAL THERAPIST

## 2025-04-07 PROCEDURE — 97140 MANUAL THERAPY 1/> REGIONS: CPT | Performed by: PHYSICAL THERAPIST

## 2025-04-07 PROCEDURE — 97530 THERAPEUTIC ACTIVITIES: CPT | Performed by: PHYSICAL THERAPIST

## 2025-04-07 NOTE — PROGRESS NOTES
Physical Therapy Daily Treatment Note  [facility] - [facility]  [address]  [place] [address]    Patient: Jody Simmons   : 1945  Referring practitioner: Manolo Reddy MD  Today's Date: 2025  Patient seen for 3 sessions    Visit Diagnoses:    ICD-10-CM ICD-9-CM   1. Chronic pain of both knees  M25.561 719.46    M25.562 338.29    G89.29    2. Primary osteoarthritis of both knees  M17.0 715.16   3. Impaired functional mobility, balance, gait, and endurance  Z74.09 V49.89       Subjective   Jody Simmons reports: that she used a shop vac to suck up some water in her basement and had to use a step ladder and ruchi her knee when she was getting off of the step.  Usually the knees do not hurt at the same time.  Feels like we are making some progress.       Objective   Presents with symmetrical gait pattern    Tenderness right knee anterior lateral joint line    See Exercise, Manual, and Modality Logs for complete treatment.     Patient Education: need for improved stabilization of hte knees    Assessment/Plan  Jody has been very compliant with her therapy.  She has continued to do more at home at times than she should but is determine to be independent.  Needs more WB exercises for balance/control as well.    Progress strengthening /stabilization /functional activity           Timed:  Manual Therapy:    10     mins  80708;  Therapeutic Exercise:    20/30     mins  65607;     Neuromuscular Luis A:    0    mins  21527;    Therapeutic Activity:     10     mins  64838;     Ultrasound:      0     mins  25141;    Iontophoresis              __0_   mins  Dry Needling               _____   mins        Untimed:  Electrical Stimulation:     0    mins  03943 ( );  Mechanical Traction:             mins  80922;   Paraffin                       _____  mins     Timed Treatment:   40   mins   Total Treatment:     60   mins    Ayesha Horowitz, PT  [place] License #  [id number]  Physical Therapist    Electronically signed by Ayesha Horowitz, PT, 04/07/25, 2:42 PM EDT

## 2025-04-09 ENCOUNTER — TREATMENT (OUTPATIENT)
Dept: PHYSICAL THERAPY | Facility: CLINIC | Age: 80
End: 2025-04-09
Payer: MEDICARE

## 2025-04-09 DIAGNOSIS — G89.29 CHRONIC PAIN OF BOTH KNEES: Primary | ICD-10-CM

## 2025-04-09 DIAGNOSIS — M17.0 PRIMARY OSTEOARTHRITIS OF BOTH KNEES: ICD-10-CM

## 2025-04-09 DIAGNOSIS — M25.562 CHRONIC PAIN OF BOTH KNEES: Primary | ICD-10-CM

## 2025-04-09 DIAGNOSIS — M25.561 CHRONIC PAIN OF BOTH KNEES: Primary | ICD-10-CM

## 2025-04-09 DIAGNOSIS — Z74.09 IMPAIRED FUNCTIONAL MOBILITY, BALANCE, GAIT, AND ENDURANCE: ICD-10-CM

## 2025-04-09 NOTE — PROGRESS NOTES
Physical Therapy Daily Treatment Note  [facility] Physical Therapy [facility]  [address], [address]  [place] [address]    Patient: Jody Simmons   : 1945  Referring practitioner: Manolo Reddy MD  Today's Date: 2025  Patient seen for 4 sessions    Visit Diagnoses:    ICD-10-CM ICD-9-CM   1. Chronic pain of both knees  M25.561 719.46    M25.562 338.29    G89.29    2. Primary osteoarthritis of both knees  M17.0 715.16   3. Impaired functional mobility, balance, gait, and endurance  Z74.09 V49.89       Subjective   Jody Simmons reports: that her left knee is feeling pretty good.  Notes that the right knee has been more symptomatic.  States that she has been dong more stair climbing laterally as she has had some work done in her bassement.      Objective   Tenderness in left anterior medial knee    Symmetrical gait pattern but does exhibit decreased push off on both LE's    See Exercise, Manual, and Modality Logs for complete treatment.     Patient Education: focus on proper gait pattern    Assessment/Plan  Patient very compliant with her therapy.  Able to perform piriformis stretch today without pain in the knees.  Able to tolerate Leg Press without pain today as well indicating increased activity tolerance    Progress strengthening /stabilization /functional activity           Timed:  Manual Therapy:    10     mins  27781;  Therapeutic Exercise:    20/30     mins  82016;     Neuromuscular Luis A:    0    mins  04382;    Therapeutic Activity:     10     mins  21478;     Ultrasound:      0     mins  34974;    Iontophoresis              __0_   mins  Dry Needling               _____   mins        Untimed:  Electrical Stimulation:     0    mins  31968 ( );  Mechanical Traction:             mins  14291;   Paraffin                       _____  mins     Timed Treatment:   40   mins   Total Treatment:     70   mins    Ayesha Horowitz, PT  [place] License # [id number]  Physical  Therapist    Electronically signed by Ayesha Horowitz, PT, 04/09/25, 3:02 PM EDT

## 2025-04-15 ENCOUNTER — TREATMENT (OUTPATIENT)
Dept: PHYSICAL THERAPY | Facility: CLINIC | Age: 80
End: 2025-04-15
Payer: MEDICARE

## 2025-04-15 DIAGNOSIS — Z74.09 IMPAIRED FUNCTIONAL MOBILITY, BALANCE, GAIT, AND ENDURANCE: ICD-10-CM

## 2025-04-15 DIAGNOSIS — M17.0 PRIMARY OSTEOARTHRITIS OF BOTH KNEES: ICD-10-CM

## 2025-04-15 DIAGNOSIS — M25.561 CHRONIC PAIN OF BOTH KNEES: Primary | ICD-10-CM

## 2025-04-15 DIAGNOSIS — G89.29 CHRONIC PAIN OF BOTH KNEES: Primary | ICD-10-CM

## 2025-04-15 DIAGNOSIS — M25.562 CHRONIC PAIN OF BOTH KNEES: Primary | ICD-10-CM

## 2025-04-15 PROCEDURE — 97140 MANUAL THERAPY 1/> REGIONS: CPT | Performed by: PHYSICAL THERAPIST

## 2025-04-15 PROCEDURE — 97530 THERAPEUTIC ACTIVITIES: CPT | Performed by: PHYSICAL THERAPIST

## 2025-04-15 PROCEDURE — 97110 THERAPEUTIC EXERCISES: CPT | Performed by: PHYSICAL THERAPIST

## 2025-04-15 NOTE — PROGRESS NOTES
Physical Therapy Daily Treatment Note  [facility] Physical Therapy [facility]  [address]  [place] [address]    Patient: Jody Simmons   : 1945  Referring practitioner: Manolo Reddy MD  Today's Date: 4/15/2025  Patient seen for 5 sessions    Visit Diagnoses:    ICD-10-CM ICD-9-CM   1. Chronic pain of both knees  M25.561 719.46    M25.562 338.29    G89.29    2. Primary osteoarthritis of both knees  M17.0 715.16   3. Impaired functional mobility, balance, gait, and endurance  Z74.09 V49.89       Subjective   Jody Simmons reports: that she is making progress.  States that she is walking better as she now trusts the knees to support her properly.  Balance has increased.  States that she had to do a lot of walking on Friday at the hospital with a friend so had more soreness on Saturday.       Objective   Walks with symmetrical gait pattern     Minimal/no swelling in the left knee     See Exercise, Manual, and Modality Logs for complete treatment.     Patient Education: cont HEP    Assessment/Plan  Jody is feeling better and trusting her knees to support her better now.  Feels stronger and has less pain and swelling in the right knee.    Progress strengthening /stabilization /functional activity           Timed:  Manual Therapy:    10     mins  33353;  Therapeutic Exercise:    15/25     mins  92694;     Neuromuscular Luis A:    0    mins  20005;    Therapeutic Activity:     15     mins  45527;     Ultrasound:      0     mins  97940;    Iontophoresis              __0_   mins  Dry Needling               _____   mins        Untimed:  Electrical Stimulation:     0    mins  08711 ( );  Mechanical Traction:             mins  42495;   Paraffin                       _____  mins     Timed Treatment:   40   mins   Total Treatment:     60   mins    Ayesha Horowitz PT  [place] License # [id number]  Physical Therapist    Electronically signed by Ayesha Horowitz PT, 04/15/25, 2:29 PM  EDT

## 2025-04-18 ENCOUNTER — TREATMENT (OUTPATIENT)
Dept: PHYSICAL THERAPY | Facility: CLINIC | Age: 80
End: 2025-04-18
Payer: MEDICARE

## 2025-04-18 DIAGNOSIS — M25.562 CHRONIC PAIN OF BOTH KNEES: Primary | ICD-10-CM

## 2025-04-18 DIAGNOSIS — Z74.09 IMPAIRED FUNCTIONAL MOBILITY, BALANCE, GAIT, AND ENDURANCE: ICD-10-CM

## 2025-04-18 DIAGNOSIS — G89.29 CHRONIC PAIN OF BOTH KNEES: Primary | ICD-10-CM

## 2025-04-18 DIAGNOSIS — M17.0 PRIMARY OSTEOARTHRITIS OF BOTH KNEES: ICD-10-CM

## 2025-04-18 DIAGNOSIS — M25.561 CHRONIC PAIN OF BOTH KNEES: Primary | ICD-10-CM

## 2025-04-18 NOTE — PROGRESS NOTES
Physical Therapy Daily Treatment Note  4/18/2025  Visit Diagnoses:    ICD-10-CM ICD-9-CM   1. Chronic pain of both knees  M25.561 719.46    M25.562 338.29    G89.29    2. Primary osteoarthritis of both knees  M17.0 715.16   3. Impaired functional mobility, balance, gait, and endurance  Z74.09 V49.89       VISIT#: 6      Jody Simmons reports: She was out in her yard a lot yesterday. She pulled her trash cans from her gate to the road and today her knees have a persistent moderate achiness.  Affected Area: Bilateral anterior medial joint lines, no patellar pain      Functional Deficits/Irritating Factors: sleeping, ambulation, squatting, stairs, lifting and standing    Objective    Increased sets/reps of:  none   Increased resistance on:  leg press  Added to Program: sidestepping        See Exercise, Manual, and Modality Logs for complete treatment.     Patient Education: Pt was educated on exercise biomechanical correctness, intensity, and speed.     Assessment:  Jody presented with increased achiness in her knees after working out in her yard and pulling trash cans. She thinks she tweaked her knees with that activity. She did complete her exercise flow sheet but did need a break secondary to knees having increased discomfort.  Pt will continue to benefit from skilled PT interventions to address current functional deficits and impairments.     Plan Goals: SHORT TERM GOALS: Time for Goal Achievement: 4 weeks    1.  Patient to be compliant with HEP.   2.  Patient will be able to walk on level surfaces for 10 minutes with a normal gait pattern  3.  Patient will be able to sleep without increased symptoms  4.  Patient will have 50% less pain when climbing up/down stairs     LONG TERM GOALS: Time for Goal Achievement: 8 weeks  1.  Patient will be independent in performing current HEP and understand progress ion of the program  2.  Patient will have pain free functional knee AROM  3.  Patient will be able to walk on level  surfaces/inclines and up/down stairs with minimal to no increase in symptoms   4.  Pt will be able to walk 2 laps around her neighborhood park without increased symptoms        Plan: Progress to/Continue with current program.       Timed:  Manual Therapy:            0_    mins  45690;  Ultrasound:                     0      mins  20943;   Therapeutic Exercise:    30     mins  18027;     Neuromuscular Luis A:   0     mins  20251;    Therapeutic Activity:      0     mins  12754;      Iontophoresis              _0__   mins  Dry Needling               _0____   mins         Untimed:  Work Conditioning: __0__ mins 11362  Electrical Stimulation:    0    mins  55069 ( );  Mechanical Traction:       0        mins  31830;   Paraffin                       __0___  mins   Ice/Heat: __0__ mins      Timed Treatment:   30   mins   Total Treatment:     30   mins          Qing Vázquez PTA  [place] License # [id number]  Physical Therapist Assistant

## 2025-04-20 NOTE — PROGRESS NOTES
Physical Therapy Daily Treatment Note  4/21/2025  Visit Diagnoses:    ICD-10-CM ICD-9-CM   1. Chronic pain of both knees  M25.561 719.46    M25.562 338.29    G89.29    2. Primary osteoarthritis of both knees  M17.0 715.16   3. Impaired functional mobility, balance, gait, and endurance  Z74.09 V49.89       VISIT#: 7      Jody Simmons reports: Her L knee is doing so much better than her R knee. Her L knee had a cortisone shot a few months ago. She is making progress. Her sore spot in is her medial R knee. When she first stands up in the morning her balance is so much better  But some time when she is walking her R Knee seems to turn outward and she feels like she might fall. No pain in her knee today - it is off and on. She is concerned about her swelling.   Affected Area: Bilateral anterior medial joint lines, no patellar pain      Functional Deficits/Irritating Factors: sleeping, ambulation, squatting, stairs, lifting and standing  Progression: improving  Compliance with HEP Reported: Yes    Objective  Presents: B pitting edema in lower legs  Increased sets/reps of:  none   Increased resistance on:  HS curls, Marching, hip extension        Tightness in L HS  See Exercise, Manual, and Modality Logs for complete treatment.     Patient Education: Pt was educated on exercise biomechanical correctness, intensity, and speed. Discussion about her swelling    Assessment:  Jody has made good improvement in her knees. She does have edema in her LEs with grade 2 pitting in her lower legs.  Overall swelling is more on her R. Noted some skin color change to lower legs that appears to be mildly red. May need to be checked out by her primary physician for further evaluation. Pt will continue to benefit from skilled PT interventions to address current functional deficits and impairments.     Plan Goals: SHORT TERM GOALS: Time for Goal Achievement: 4 weeks    1.  Patient to be compliant with HEP. - MET  2.  Patient will be able to  walk on level surfaces for 10 minutes with a normal gait pattern - MET  3.  Patient will be able to sleep without increased symptoms  4.  Patient will have 50% less pain when climbing up/down stairs     LONG TERM GOALS: Time for Goal Achievement: 8 weeks  1.  Patient will be independent in performing current HEP and understand progress ion of the program  2.  Patient will have pain free functional knee AROM  3.  Patient will be able to walk on level surfaces/inclines and up/down stairs with minimal to no increase in symptoms   4.  Pt will be able to walk 2 laps around her neighborhood park without increased symptoms        Plan: Progress to/Continue with current program.       Timed:  Manual Therapy:            8_    mins  25898;  Ultrasound:                     0      mins  99400;   Therapeutic Exercise:    25     mins  26597;     Neuromuscular Luis A:   0     mins  35152;    Therapeutic Activity:      0     mins  07478;      Iontophoresis              _0__   mins  Dry Needling               _0____   mins         Untimed:  Work Conditioning: __0__ mins 96751  Electrical Stimulation:    0    mins  33792 ( );  Mechanical Traction:       0        mins  42650;   Paraffin                       __0___  mins   Ice/Heat: __0__ mins      Timed Treatment:   33   mins   Total Treatment:     33   mins          LACIE Isaac License # [id number]  Physical Therapist Assistant

## 2025-04-21 ENCOUNTER — TREATMENT (OUTPATIENT)
Dept: PHYSICAL THERAPY | Facility: CLINIC | Age: 80
End: 2025-04-21
Payer: MEDICARE

## 2025-04-21 DIAGNOSIS — Z74.09 IMPAIRED FUNCTIONAL MOBILITY, BALANCE, GAIT, AND ENDURANCE: ICD-10-CM

## 2025-04-21 DIAGNOSIS — M25.562 CHRONIC PAIN OF BOTH KNEES: Primary | ICD-10-CM

## 2025-04-21 DIAGNOSIS — M25.561 CHRONIC PAIN OF BOTH KNEES: Primary | ICD-10-CM

## 2025-04-21 DIAGNOSIS — M17.0 PRIMARY OSTEOARTHRITIS OF BOTH KNEES: ICD-10-CM

## 2025-04-21 DIAGNOSIS — G89.29 CHRONIC PAIN OF BOTH KNEES: Primary | ICD-10-CM

## 2025-04-21 PROCEDURE — 97110 THERAPEUTIC EXERCISES: CPT | Performed by: PHYSICAL THERAPIST

## 2025-04-21 PROCEDURE — 97140 MANUAL THERAPY 1/> REGIONS: CPT | Performed by: PHYSICAL THERAPIST

## 2025-04-24 ENCOUNTER — TREATMENT (OUTPATIENT)
Dept: PHYSICAL THERAPY | Facility: CLINIC | Age: 80
End: 2025-04-24
Payer: MEDICARE

## 2025-04-24 DIAGNOSIS — M25.561 CHRONIC PAIN OF BOTH KNEES: Primary | ICD-10-CM

## 2025-04-24 DIAGNOSIS — M17.0 PRIMARY OSTEOARTHRITIS OF BOTH KNEES: ICD-10-CM

## 2025-04-24 DIAGNOSIS — M25.562 CHRONIC PAIN OF BOTH KNEES: Primary | ICD-10-CM

## 2025-04-24 DIAGNOSIS — Z74.09 IMPAIRED FUNCTIONAL MOBILITY, BALANCE, GAIT, AND ENDURANCE: ICD-10-CM

## 2025-04-24 DIAGNOSIS — G89.29 CHRONIC PAIN OF BOTH KNEES: Primary | ICD-10-CM

## 2025-04-24 NOTE — PROGRESS NOTES
Physical Therapy Daily Treatment Note  [facility] Physical Therapy [facility]  [address], [address]  [place] [address]    Patient: Jody Simmons   : 1945  Referring practitioner: Manolo Reddy MD  Today's Date: 2025  Patient seen for 8 sessions    Visit Diagnoses:    ICD-10-CM ICD-9-CM   1. Chronic pain of both knees  M25.561 719.46    M25.562 338.29    G89.29    2. Primary osteoarthritis of both knees  M17.0 715.16   3. Impaired functional mobility, balance, gait, and endurance  Z74.09 V49.89       Subjective   Jody Simmons reports: that she feels like she is making progress.  States that she is stronger now as it is much easier for her to get off of her couch.  Notes that yesterday she was having more pain in the right knee causing her to limp.        Objective   Presents with slightly antalgic gait on the right - occasional lateral sway to the left    Tenderness right knee anterior medial joint line    Crepitus with active knee extension from flexion    See Exercise, Manual, and Modality Logs for complete treatment.     Patient Education: need for further strengthening to support the knee    Assessment/Plan  Jody has less pain and increased function compared to her initial status.  She still has occasional sharp pains in the right medial knee but not as frequent.  Able to get up/down off of the couch more easily now without UE assist.     Progress strengthening /stabilization /functional activity           Timed:  Manual Therapy:    10     mins  69628;  Therapeutic Exercise:    20/30     mins  62414;     Neuromuscular Luis A:    0    mins  79170;    Therapeutic Activity:     10     mins  22227;     Ultrasound:      0     mins  60769;    Iontophoresis              __0_   mins  Dry Needling               _____   mins        Untimed:  Electrical Stimulation:     0    mins  75107 ( );  Mechanical Traction:             mins  25232;   Paraffin                       _____   mins     Timed Treatment:   40   mins   Total Treatment:     65   mins    Ayesha Horowitz, PT  [place] License # [id number]  Physical Therapist    Electronically signed by Ayesha Horowitz PT, 04/24/25, 2:23 PM EDT

## 2025-04-28 ENCOUNTER — TREATMENT (OUTPATIENT)
Dept: PHYSICAL THERAPY | Facility: CLINIC | Age: 80
End: 2025-04-28
Payer: MEDICARE

## 2025-04-28 DIAGNOSIS — M25.562 CHRONIC PAIN OF BOTH KNEES: Primary | ICD-10-CM

## 2025-04-28 DIAGNOSIS — M17.0 PRIMARY OSTEOARTHRITIS OF BOTH KNEES: ICD-10-CM

## 2025-04-28 DIAGNOSIS — G89.29 CHRONIC PAIN OF BOTH KNEES: Primary | ICD-10-CM

## 2025-04-28 DIAGNOSIS — M25.561 CHRONIC PAIN OF BOTH KNEES: Primary | ICD-10-CM

## 2025-04-28 DIAGNOSIS — Z74.09 IMPAIRED FUNCTIONAL MOBILITY, BALANCE, GAIT, AND ENDURANCE: ICD-10-CM

## 2025-04-28 PROCEDURE — 97112 NEUROMUSCULAR REEDUCATION: CPT | Performed by: PHYSICAL THERAPIST

## 2025-04-28 PROCEDURE — 97140 MANUAL THERAPY 1/> REGIONS: CPT | Performed by: PHYSICAL THERAPIST

## 2025-04-28 PROCEDURE — 97110 THERAPEUTIC EXERCISES: CPT | Performed by: PHYSICAL THERAPIST

## 2025-04-28 NOTE — PROGRESS NOTES
Physical Therapy Daily Treatment Note  [facility] Physical Therapy [facility]  [address], [address]  [place] [address]    Patient: Jody Simmons   : 1945  Referring practitioner: Manolo Reddy MD  Today's Date: 2025  Patient seen for 9 sessions    Visit Diagnoses:    ICD-10-CM ICD-9-CM   1. Chronic pain of both knees  M25.561 719.46    M25.562 338.29    G89.29    2. Primary osteoarthritis of both knees  M17.0 715.16   3. Impaired functional mobility, balance, gait, and endurance  Z74.09 V49.89       Subjective   Jody Simmons reports: that her right knee feels weaker without her tape but was able to get through the weekend without too much pain.  Wants to get taped next visit to assist her with endurance for [employer].      Objective   Presents walking with a symmetrical gait pattern     Tenderness right knee anterior medial joint line     See Exercise, Manual, and Modality Logs for complete treatment.     Patient Education: cont HEP    Assessment/Plan  Jody has much less pain in general than she did upon her initial evaluation.  Still has pain in right knee especially with climbing down steps.  Needs additional strengthening for stabilzation     Progress strengthening /stabilization /functional activity  Tape next session         Timed:  Manual Therapy:    10     mins  39242;  Therapeutic Exercise:    10/30     mins  23926;     Neuromuscular Luis A:    10    mins  31615;    Therapeutic Activity:     10     mins  85656;     Ultrasound:      0     mins  48474;    Iontophoresis              __0_   mins  Dry Needling               _____   mins        Untimed:  Electrical Stimulation:     0    mins  36714 ( );  Mechanical Traction:             mins  80993;   Paraffin                       _____  mins     Timed Treatment:   40   mins   Total Treatment:     70   mins    Ayesha Horowitz, PT  [place] License # [id number]  Physical Therapist    Electronically signed by Ayesha AMANDA  Carlos Manuel PT, 04/28/25, 2:27 PM EDT

## 2025-04-30 ENCOUNTER — TREATMENT (OUTPATIENT)
Dept: PHYSICAL THERAPY | Facility: CLINIC | Age: 80
End: 2025-04-30
Payer: MEDICARE

## 2025-04-30 DIAGNOSIS — M25.561 CHRONIC PAIN OF BOTH KNEES: Primary | ICD-10-CM

## 2025-04-30 DIAGNOSIS — M17.0 PRIMARY OSTEOARTHRITIS OF BOTH KNEES: ICD-10-CM

## 2025-04-30 DIAGNOSIS — G89.29 CHRONIC PAIN OF BOTH KNEES: Primary | ICD-10-CM

## 2025-04-30 DIAGNOSIS — Z74.09 IMPAIRED FUNCTIONAL MOBILITY, BALANCE, GAIT, AND ENDURANCE: ICD-10-CM

## 2025-04-30 DIAGNOSIS — M25.562 CHRONIC PAIN OF BOTH KNEES: Primary | ICD-10-CM

## 2025-04-30 PROCEDURE — 97110 THERAPEUTIC EXERCISES: CPT | Performed by: PHYSICAL THERAPIST

## 2025-04-30 PROCEDURE — 97530 THERAPEUTIC ACTIVITIES: CPT | Performed by: PHYSICAL THERAPIST

## 2025-04-30 PROCEDURE — 97112 NEUROMUSCULAR REEDUCATION: CPT | Performed by: PHYSICAL THERAPIST

## 2025-04-30 NOTE — PROGRESS NOTES
Physical Therapy Daily Treatment Note  [facility] Physical Therapy [facility]  [address], [address]  [place] [address]    Patient: Jody Simmons   : 1945  Referring practitioner: Manolo Reddy MD  Today's Date: 2025  Patient seen for 10 sessions    Visit Diagnoses:    ICD-10-CM ICD-9-CM   1. Chronic pain of both knees  M25.561 719.46    M25.562 338.29    G89.29    2. Primary osteoarthritis of both knees  M17.0 715.16   3. Impaired functional mobility, balance, gait, and endurance  Z74.09 V49.89       Subjective   Jody Simmons reports: that she is feeling like she can stand and walk for longer periods of time now.  She feels less pain in the knees and feels that her balance has increased a bit.      Objective   Walks with short stride length but is stable on her feet    See Exercise, Manual, and Modality Logs for complete treatment.     Patient Education: discussed shoe wear and standing/stairs/wallking while at [place] function    Assessment/Plan  Jody is progressing nicely with therapy.  She has less pain, feels stronger and feels that her balance is better.    Progress strengthening /stabilization /functional activity  Reassess next          Timed:  Manual Therapy:    0     mins  00624;  Therapeutic Exercise:    15/35     mins  92960;     Neuromuscular Luis A:    10    mins  93493;    Therapeutic Activity:     15     mins  25729;     Ultrasound:      0     mins  94629;    Iontophoresis              __0_   mins  Dry Needling               _____   mins        Untimed:  Electrical Stimulation:     0    mins  02270 ( );  Mechanical Traction:             mins  49067;   Paraffin                       _____  mins     Timed Treatment:   40   mins   Total Treatment:     70   mins    Ayesha Horowitz PT  [place] License # [id number]  Physical Therapist    Electronically signed by Ayesha Horowitz PT, 25, 2:48 PM EDT

## 2025-05-02 RX ORDER — ATORVASTATIN CALCIUM 20 MG/1
20 TABLET, FILM COATED ORAL DAILY
Qty: 90 TABLET | Refills: 3 | OUTPATIENT
Start: 2025-05-02

## 2025-05-06 ENCOUNTER — TREATMENT (OUTPATIENT)
Dept: PHYSICAL THERAPY | Facility: CLINIC | Age: 80
End: 2025-05-06
Payer: MEDICARE

## 2025-05-06 DIAGNOSIS — M25.562 CHRONIC PAIN OF BOTH KNEES: Primary | ICD-10-CM

## 2025-05-06 DIAGNOSIS — M25.561 CHRONIC PAIN OF BOTH KNEES: Primary | ICD-10-CM

## 2025-05-06 DIAGNOSIS — M17.0 PRIMARY OSTEOARTHRITIS OF BOTH KNEES: ICD-10-CM

## 2025-05-06 DIAGNOSIS — G89.29 CHRONIC PAIN OF BOTH KNEES: Primary | ICD-10-CM

## 2025-05-06 DIAGNOSIS — Z74.09 IMPAIRED FUNCTIONAL MOBILITY, BALANCE, GAIT, AND ENDURANCE: ICD-10-CM

## 2025-05-06 PROCEDURE — 97140 MANUAL THERAPY 1/> REGIONS: CPT | Performed by: PHYSICAL THERAPIST

## 2025-05-06 PROCEDURE — 97530 THERAPEUTIC ACTIVITIES: CPT | Performed by: PHYSICAL THERAPIST

## 2025-05-06 PROCEDURE — 97110 THERAPEUTIC EXERCISES: CPT | Performed by: PHYSICAL THERAPIST

## 2025-05-06 NOTE — PROGRESS NOTES
Physical Therapy Daily Treatment Note - Reassessment  [facility] Physical Therapy [facility]  [address], [address]  [place] [address]    Patient: Jody Simmons   : 1945  Referring practitioner: Manolo Reddy MD  Today's Date: 2025  Patient seen for 11 sessions    Visit Diagnoses:    ICD-10-CM ICD-9-CM   1. Chronic pain of both knees  M25.561 719.46    M25.562 338.29    G89.29    2. Primary osteoarthritis of both knees  M17.0 715.16   3. Impaired functional mobility, balance, gait, and endurance  Z74.09 V49.89       Subjective   Jody Simmons reports: that she was very active this weekend with [place] functions.  She states that she was up on her feet so much and noted that she had more pain in the right medial knee.  Pain now varies from 0-5/10 with the increased pain with climbing steps.  States that some days she has no pain in the left knee at all.      Objective   Presents walking with symmetrical gait pattern     Tenderness right knee anterior medial joint line    See Exercise, Manual, and Modality Logs for complete treatment.     Patient Education: cont HEP    Assessment/Plan  Patient has demonstrated moderate  improvement since the initiation of therapy.  The pain has  greatly decreased on the left knee but still is present on the right especially since she was so active this weekend. .  The motion has increased on both hips and knees.  The activity tolerances have increased but not quite to desired levels. I feel that the patient would benefit from a further short course of therapy.      Plan Goals: SHORT TERM GOALS: Time for Goal Achievement: 4 weeks    1.  Patient to be compliant with HEP. MET  2.  Patient will be able to walk on level surfaces for 10 minutes with a normal gait pattern MET  3.  Patient will be able to sleep without increased symptomsMET  4.  Patient will have 50% less pain when climbing up/down stairs MET     LONG TERM GOALS: Time for Goal  Achievement: 8 weeks  1.  Patient will be independent in performing current HEP and understand progress ion of the program MET  2.  Patient will have pain free functional knee AROM Partially met  3.  Patient will be able to walk on level surfaces/inclines and up/down stairs with minimal to no increase in symptoms -Progressing  4.  Pt will be able to walk 2 laps around her neighborhood [place] without increased symptoms - progressing    Progress strengthening /stabilization /functional activity           Timed:  Manual Therapy:    10     mins  31901;  Therapeutic Exercise:    10/30     mins  70393;     Neuromuscular Luis A:    0    mins  02608;    Therapeutic Activity:     10     mins  29174;     Ultrasound:      8     mins  89486;    Iontophoresis              __0_   mins  Dry Needling               _____   mins        Untimed:  Electrical Stimulation:     0    mins  95885 ( );  Mechanical Traction:             mins  30650;   Paraffin                       _____  mins     Timed Treatment:   38   mins   Total Treatment:     65   mins    Ayseha Horowizt PT  [place] License # [id number]  Physical Therapist    Electronically signed by Ayesha Horowitz PT, 05/06/25, 2:06 PM EDT

## 2025-05-08 ENCOUNTER — TREATMENT (OUTPATIENT)
Dept: PHYSICAL THERAPY | Facility: CLINIC | Age: 80
End: 2025-05-08
Payer: MEDICARE

## 2025-05-08 DIAGNOSIS — G89.29 CHRONIC PAIN OF BOTH KNEES: Primary | ICD-10-CM

## 2025-05-08 DIAGNOSIS — M25.561 CHRONIC PAIN OF BOTH KNEES: Primary | ICD-10-CM

## 2025-05-08 DIAGNOSIS — M17.0 PRIMARY OSTEOARTHRITIS OF BOTH KNEES: ICD-10-CM

## 2025-05-08 DIAGNOSIS — M25.562 CHRONIC PAIN OF BOTH KNEES: Primary | ICD-10-CM

## 2025-05-08 DIAGNOSIS — Z74.09 IMPAIRED FUNCTIONAL MOBILITY, BALANCE, GAIT, AND ENDURANCE: ICD-10-CM

## 2025-05-08 NOTE — PROGRESS NOTES
Physical Therapy Daily Treatment Note  [facility] Physical Therapy [facility]  [address]  [place] [address]    Patient: Jody Simmons   : 1945  Referring practitioner: Manolo Reddy MD  Today's Date: 2025  Patient seen for 12 sessions    Visit Diagnoses:    ICD-10-CM ICD-9-CM   1. Chronic pain of both knees  M25.561 719.46    M25.562 338.29    G89.29    2. Primary osteoarthritis of both knees  M17.0 715.16   3. Impaired functional mobility, balance, gait, and endurance  Z74.09 V49.89       Subjective   Jody Simmons reports: that she felt really good for 2 days after her last session.  States that the pain returned yesterday after doing quite a bit of walking while at the hospital for other MD appts.       Objective   Tenderness right knee anterior medial joint line    Walking with symmetrical gait pattern    See Exercise, Manual, and Modality Logs for complete treatment.     Patient Education: cont HEP, dicussed getting another steroid injection in the right knee     Assessment/Plan  Jody is doing very well with the left knee as she has minimal pain in it now.  The right knee still is symptomatic with stair climbing, squatting and prolonged walking.  She has less pain now than initially but may still require a steroid injection for relief of her symptoms .     Progress strengthening /stabilization /functional activity           Timed:  Manual Therapy:    10     mins  27786;  Therapeutic Exercise:    10/20     mins  06663;     Neuromuscular Luis A:    10    mins  69735;    Therapeutic Activity:     10     mins  94965;     Ultrasound:      0/8     mins  02210;    Iontophoresis              __0_   mins  Dry Needling               _____   mins        Untimed:  Electrical Stimulation:     0    mins  81849 ( );  Mechanical Traction:             mins  81814;   Paraffin                       _____  mins     Timed Treatment:   40   mins   Total Treatment:     75    mins    Ayesha Horowitz, PT  [place] License # [id number]  Physical Therapist    Electronically signed by Ayesha Horowitz, PT, 05/08/25, 2:01 PM EDT

## 2025-05-13 ENCOUNTER — TREATMENT (OUTPATIENT)
Dept: PHYSICAL THERAPY | Facility: CLINIC | Age: 80
End: 2025-05-13
Payer: MEDICARE

## 2025-05-13 DIAGNOSIS — G89.29 CHRONIC PAIN OF BOTH KNEES: Primary | ICD-10-CM

## 2025-05-13 DIAGNOSIS — M17.0 PRIMARY OSTEOARTHRITIS OF BOTH KNEES: ICD-10-CM

## 2025-05-13 DIAGNOSIS — Z74.09 IMPAIRED FUNCTIONAL MOBILITY, BALANCE, GAIT, AND ENDURANCE: ICD-10-CM

## 2025-05-13 DIAGNOSIS — M25.562 CHRONIC PAIN OF BOTH KNEES: Primary | ICD-10-CM

## 2025-05-13 DIAGNOSIS — M25.561 CHRONIC PAIN OF BOTH KNEES: Primary | ICD-10-CM

## 2025-05-13 PROCEDURE — 97110 THERAPEUTIC EXERCISES: CPT | Performed by: PHYSICAL THERAPIST

## 2025-05-13 PROCEDURE — 97530 THERAPEUTIC ACTIVITIES: CPT | Performed by: PHYSICAL THERAPIST

## 2025-05-13 PROCEDURE — 97112 NEUROMUSCULAR REEDUCATION: CPT | Performed by: PHYSICAL THERAPIST

## 2025-05-13 NOTE — PROGRESS NOTES
Physical Therapy Daily Treatment Note  [facility] Physical Therapy - [facility]  [address], [address]  [place] [address]    Patient: Jody Simmons   : 1945  Referring practitioner: Manolo Reddy MD  Today's Date: 2025  Patient seen for 13 sessions    Visit Diagnoses:    ICD-10-CM ICD-9-CM   1. Chronic pain of both knees  M25.561 719.46    M25.562 338.29    G89.29    2. Primary osteoarthritis of both knees  M17.0 715.16   3. Impaired functional mobility, balance, gait, and endurance  Z74.09 V49.89       Subjective   Jody Simmons reports: that the left knee is feeling much better but the right knee is giving her more trouble.  Has medial knee joint line pain as well as infra patellar region.  Does report that she did a bit of yard work but was very careful      Objective   Tenderness anterior medial joint line     See Exercise, Manual, and Modality Logs for complete treatment.     Patient Education: call MD to schedule an appt for a check up on the right knee and maybe get an injection     Assessment/Plan  Jody is very compliant with her therapy but does tend to overdo it at home.  Joint line tenderness persists and has a sense of sharp pain at times.  Was instructed to see ortho to get knee further evaluated as an injection was recommended a few months ago and she declined it.    Progress strengthening /stabilization /functional activity           Timed:  Manual Therapy:    0     mins  57397;  Therapeutic Exercise:    10/40     mins  84320;     Neuromuscular Luis A:    10    mins  05490;    Therapeutic Activity:     10     mins  92698;     Ultrasound:      8     mins  88582;    Iontophoresis              __0_   mins  Dry Needling               _____   mins        Untimed:  Electrical Stimulation:         mins  35243 ( );  Mechanical Traction:             mins  34753;   Paraffin                       _____  mins     Timed Treatment:   38   mins   Total Treatment:     65    mins    Ayesha Horowitz, PT  [place] License # [id number]  Physical Therapist    Electronically signed by Ayesha Horowitz, PT, 05/13/25, 2:20 PM EDT

## 2025-05-16 ENCOUNTER — TREATMENT (OUTPATIENT)
Dept: PHYSICAL THERAPY | Facility: CLINIC | Age: 80
End: 2025-05-16
Payer: MEDICARE

## 2025-05-16 DIAGNOSIS — M25.561 CHRONIC PAIN OF BOTH KNEES: Primary | ICD-10-CM

## 2025-05-16 DIAGNOSIS — Z74.09 IMPAIRED FUNCTIONAL MOBILITY, BALANCE, GAIT, AND ENDURANCE: ICD-10-CM

## 2025-05-16 DIAGNOSIS — M25.562 CHRONIC PAIN OF BOTH KNEES: Primary | ICD-10-CM

## 2025-05-16 DIAGNOSIS — M17.0 PRIMARY OSTEOARTHRITIS OF BOTH KNEES: ICD-10-CM

## 2025-05-16 DIAGNOSIS — G89.29 CHRONIC PAIN OF BOTH KNEES: Primary | ICD-10-CM

## 2025-05-16 NOTE — PROGRESS NOTES
Physical Therapy Daily Treatment Note  5/16/2025  Visit Diagnoses:    ICD-10-CM ICD-9-CM   1. Chronic pain of both knees  M25.561 719.46    M25.562 338.29    G89.29    2. Primary osteoarthritis of both knees  M17.0 715.16   3. Impaired functional mobility, balance, gait, and endurance  Z74.09 V49.89       VISIT#: 14      Jody Simmons reports: she intends to go and get a shot in her R knee. Her R knee is the most problematic. Last December she irritated her L knee and she got an injection in her L knee which helped a lot. She was sent to therapy. She was supposed to get an injection in her R knee.   Affected Area: B knees, R>L      Objective         See Exercise, Manual, and Modality Logs for complete treatment.     Patient Education: Pt was educated on exercise biomechanical correctness, intensity, and speed. Discussion about the swelling and rash on her B Les.     Assessment:  Jody has not been satisfied with MD's answer about why her B LE's have swelling and a rash - thinks it is from her knees. Discussed some other possibilities but ultimately explained it was out of my scope of practice and to discuss it further with her MD. She wanted to know what type of MD she should contact. Told her, her primary care could recommend one. The rash of her lower legs looks like a map-like rash seen in cellulitis or beginning of some hemosiderin staining. Do believe she should pursue the issue until she has received an answer to her understanding.  Pt will continue to benefit from skilled PT interventions to address current functional deficits and impairments.       Plan: Progress to/Continue with current program.       Timed:  Manual Therapy:            8_    mins  56016;  Ultrasound:                     0/10      mins  73205;   Therapeutic Exercise:    15     mins  05509;     Neuromuscular Luis A:   10     mins  60080;    Therapeutic Activity:      10     mins  57107;      Iontophoresis              _0__   mins  Dry Needling                _0____   mins         Untimed:  Work Conditioning: __0__ mins 74207  Electrical Stimulation:    0    mins  99466 ( );  Mechanical Traction:       0        mins  46716;   Paraffin                       __0___  mins   Ice/Heat: __0__ mins      Timed Treatment:   43   mins   Total Treatment:     53   mins          Qing Vázquez PTA  [place] License # [id number]  Physical Therapist Assistant

## 2025-05-20 ENCOUNTER — TREATMENT (OUTPATIENT)
Dept: PHYSICAL THERAPY | Facility: CLINIC | Age: 80
End: 2025-05-20
Payer: MEDICARE

## 2025-05-20 DIAGNOSIS — M25.561 CHRONIC PAIN OF BOTH KNEES: Primary | ICD-10-CM

## 2025-05-20 DIAGNOSIS — Z74.09 IMPAIRED FUNCTIONAL MOBILITY, BALANCE, GAIT, AND ENDURANCE: ICD-10-CM

## 2025-05-20 DIAGNOSIS — M25.562 CHRONIC PAIN OF BOTH KNEES: Primary | ICD-10-CM

## 2025-05-20 DIAGNOSIS — G89.29 CHRONIC PAIN OF BOTH KNEES: Primary | ICD-10-CM

## 2025-05-20 DIAGNOSIS — M17.0 PRIMARY OSTEOARTHRITIS OF BOTH KNEES: ICD-10-CM

## 2025-05-20 NOTE — PROGRESS NOTES
Physical Therapy Daily Treatment Note  [facility] Physical Therapy - [facility]  [address]  [place] [address]    Patient: Jody Simmons   : 1945  Referring practitioner: Manolo Reddy MD  Today's Date: 2025  Patient seen for 15 sessions    Visit Diagnoses:    ICD-10-CM ICD-9-CM   1. Chronic pain of both knees  M25.561 719.46    M25.562 338.29    G89.29    2. Primary osteoarthritis of both knees  M17.0 715.16   3. Impaired functional mobility, balance, gait, and endurance  Z74.09 V49.89       Subjective   Jody Simmons reports: that she is to see Dr Echeverria 25 to get steroid injections as needed.  States that she is going to talk to her about the redness, upward trending alkaline phosphate and her arthritis that she is noting recently.   Has had a near fall when doing an exercise but has not actually falling.  Knee pain is much less than upon her initial evaluation but is strong enough to interfere with her normal activities .       Objective   Presents walking with symmetrical gait pattern, short stride length but appropriate push off    Tenderness anterior medial right knee joint line    See Exercise, Manual, and Modality Logs for complete treatment.     Patient Education: cont HEP    Assessment/Plan  Jody has less pain than upon her initial eval but still has right knee pain preventing stair climbing in the normal fashion.  Her leg strength has increased as she has less difficulty with sit to stand and prolonged activities.     Anticipate DC next Visit           Timed:  Manual Therapy:    0     mins  08204;  Therapeutic Exercise:    20/30     mins  90014;     Neuromuscular Luis A:    0    mins  41749;    Therapeutic Activity:     10     mins  48882;     Ultrasound:      8     mins  64794;    Iontophoresis              __0_   mins        Untimed:  Electrical Stimulation:     0    mins  26243 (MC );  Mechanical Traction:             mins  99072;   Paraffin                        _____  mins   Dry Needling               _____   mins      Timed Treatment:   38   mins   Total Treatment:     60   mins    Ayesha Horowitz, PT  [place] License # [id number]  Physical Therapist    Electronically signed by Ayesha Horowitz, PT, 05/20/25, 2:01 PM EDT

## 2025-05-21 NOTE — PROGRESS NOTES
New Knee      Patient: Jody Simmons        YOB: 1945    Medical Record Number: [id number]        Chief Complaints: Right knee pain      History of Present Illness: This is a 79-year-old female who I have not seen in almost 2 years who presents complaining of bilateral knee pain the right is much worse than the left.  She states the left is actually doing pretty good she is done multiple visits of physical therapy and that has helped the left when the right one is bothering her she did see Ayesha Horowitz for multiple visits Ayesha recommended that she get back in here she saw Jairo in December for injections in both past medical history is listed below and reviewed by me        Allergies: No Known Allergies    Medications:   Home Medications:  Current Outpatient Medications on File Prior to Visit   Medication Sig    acetaminophen (TYLENOL) 325 MG tablet Take 2 tablets by mouth Every 6 (Six) Hours As Needed for Mild Pain.    atorvastatin (LIPITOR) 20 MG tablet Take 1 tablet by mouth Daily.    Calcium Carbonate-Vitamin D (CALTRATE 600+D PO) Take  by mouth.    Carboxymethylcellulose Sodium (EYE DROPS OP) Apply  to eye(s) as directed by provider.    Cholecalciferol (VITAMIN D) 1000 UNITS tablet Take 1 tablet by mouth Daily.    Glucosamine-Chondroit-Vit C-Mn (GLUCOSAMINE CHONDR 1500 COMPLX PO)     ibuprofen (ADVIL,MOTRIN) 400 MG tablet Take 1 tablet by mouth Every 6 (Six) Hours As Needed for Mild Pain.    losartan (COZAAR) 100 MG tablet Take 1 tablet by mouth Daily.    Multiple Vitamin (MULTI VITAMIN DAILY PO) Take 1 tablet by mouth Daily.    multivitamin with minerals (MULTIVITAMIN ADULT PO) Take 1 tablet by mouth Daily. PRESERVATION AREDS     No current facility-administered medications on file prior to visit.     Current Medications:  Scheduled Meds:  Continuous Infusions:No current facility-administered medications for this visit.    PRN Meds:.    Past Medical History:   Diagnosis Date    Bilateral  malignant neoplasm of overlapping sites of breast in female 02/28/2022    Dyslipidemia     Endometrial polyp 04/21/2015    benign     Fibroid     Frozen shoulder 1980s    H/O pelvic mass     Hyperlipidemia     Hypertension yrs ago    Osteopenia     PMB (postmenopausal bleeding)     Primary osteoarthritis of right knee 03/11/2022    Screen for colon cancer 2021    Cologuard: negative result    UTI (urinary tract infection)     Visual impairment Yrs ago    Glasses        Past Surgical History:   Procedure Laterality Date    BREAST BIOPSY Bilateral 02/18/2022    STEROTACTIC BREAST BIOPSY; Left Breast Site #1 Outer Middle bowtie biopsy clip; Left Breast Site #2 12:00 position barbell clip deployed; Right Breast Upper Inner Posterior Bowtie clip deployed;    COLONOSCOPY N/A 2009    HYSTERECTOMY  2015    OOPHORECTOMY      SKIN LESION EXCISION      excision right post. calf (benign for melaoma)        Social History     Occupational History    Occupation: Teacher     Employer: RETIRED   Tobacco Use    Smoking status: Never     Passive exposure: Never    Smokeless tobacco: Never   Vaping Use    Vaping status: Never Used   Substance and Sexual Activity    Alcohol use: Yes     Alcohol/week: 1.0 standard drink of alcohol     Types: 1 Glasses of wine per week     Comment: SOCIAL rare    Drug use: No    Sexual activity: Not Currently     Partners: Male     Birth control/protection: Post-menopausal      Social History     Social History Narrative    Not on file        Family History   Problem Relation Age of Onset    Diabetes Mother     Heart disease Father     Diabetes Brother     Cancer Brother         prostate             Review of Systems:     Review of Systems      Physical Exam: 79 y.o. female  General Appearance:    Alert, cooperative, in no acute distress                 There were no vitals filed for this visit.   Patient is alert and read ×3 no acute distress appears her above-listed at height weight and age.  Affect is  normal respiratory rate is normal unlabored. Heart rate regular rate rhythm, sclera, dentition and hearing are normal for the purpose of this exam.        Ortho Exam  Physical exam of the right knee reveals no effusion, no erythema.  It mild loss of extension and full flexion  Patient has mild varus alignment.  They have mild tenderness to palpation about the medial compartment, no tenderness laterally..  The patient has a negative bounce home, negative Donna and a stable ligamentous exam.  Quad tone is reasonable and symmetric.  There are no overlying skin changes no lymphedema no lymphadenopathy.  There is good hip range of motion which is full symmetric and asymptomatic and a normal ankle exam.     Exam of her left knee no effusion no redness good range of motion quads are good    I did review x-rays from December she has significant degenerative change about the right knee with marked narrowing of all 3 compartments the left is mostly patellofemoral  Large Joint Arthrocentesis: R knee  Date/Time: 6/2/2025 8:51 AM  Consent given by: patient  Site marked: site marked  Timeout: Immediately prior to procedure a time out was called to verify the correct patient, procedure, equipment, support staff and site/side marked as required   Supporting Documentation  Indications: pain   Procedure Details  Location: knee - R knee  Preparation: Patient was prepped and draped in the usual sterile fashion  Needle gauge: 21G.  Approach: anteromedial  Medications administered: 80 mg methylPREDNISolone acetate 80 MG/ML; 2 mL lidocaine PF 1% 1 %  Patient tolerance: patient tolerated the procedure well with no immediate complications                   Assessment/Plan: Bilateral knee pain right greater than left this is degenerative in origin we talked about options I think injections very reasonable she understands her option of arthroplasty she will continue her exercises as far as the left and goes it is not hurting her right now  as long as is not bothering her we will hold off on the injection

## 2025-05-22 ENCOUNTER — TREATMENT (OUTPATIENT)
Dept: PHYSICAL THERAPY | Facility: CLINIC | Age: 80
End: 2025-05-22
Payer: MEDICARE

## 2025-05-22 DIAGNOSIS — M25.561 CHRONIC PAIN OF BOTH KNEES: Primary | ICD-10-CM

## 2025-05-22 DIAGNOSIS — G89.29 CHRONIC PAIN OF BOTH KNEES: Primary | ICD-10-CM

## 2025-05-22 DIAGNOSIS — M25.562 CHRONIC PAIN OF BOTH KNEES: Primary | ICD-10-CM

## 2025-05-22 DIAGNOSIS — M17.0 PRIMARY OSTEOARTHRITIS OF BOTH KNEES: ICD-10-CM

## 2025-05-22 DIAGNOSIS — Z74.09 IMPAIRED FUNCTIONAL MOBILITY, BALANCE, GAIT, AND ENDURANCE: ICD-10-CM

## 2025-05-22 NOTE — PROGRESS NOTES
Physical Therapy Daily Treatment Note  [facility]  [address], [address]  [place] [address]    Patient: Jody Simmons   : 1945  Referring practitioner: Manolo Reddy MD  Today's Date: 2025  Patient seen for 16 sessions    Visit Diagnoses:    ICD-10-CM ICD-9-CM   1. Chronic pain of both knees  M25.561 719.46    M25.562 338.29    G89.29    2. Primary osteoarthritis of both knees  M17.0 715.16   3. Impaired functional mobility, balance, gait, and endurance  Z74.09 V49.89       Subjective   Jody Simmons reports: that the left knee feels normal now.  States that the right knee is less painful but much stronger.  States that she is able to get up and down much more easily.  States that she feels more stable when up walking.   Does feel stiffness in the right knee.   Doing better going up/down steps but still not able to descend stairs in the reciprocal fashion.      Objective   Tenderness in anterior medial joint line right knee    Heel slide - right 10*-120*,  Left 0-124*    Presents with symmetrical gait pattern    See Exercise, Manual, and Modality Logs for complete treatment.     Patient Education: cont HEP, added HS curls and passive stretch into knee extension    Assessment/Plan  Patient has demonstrated significant  improvement since the initiation of therapy.  The pain has  resolved In the left knee and decreased in both intensity and frequency in the right knee.  The motion has increased a bit.  The activity tolerances have increased but not to desired levels. Patient is to see Dr Echeverria in 2 weeks for probably steroid injection in the right knee for further pain relief.  I feel that the patient would benefit from continuing her HEP but stopping formal therapy.        DC to HEP           Timed:  Manual Therapy:    15     mins  89657;  Therapeutic Exercise:    15/35     mins  28513;     Neuromuscular Luis A:    0    mins  17010;    Therapeutic  Activity:     10     mins  63039;     Ultrasound:      8     mins  77389;    Iontophoresis              __0_   mins        Untimed:  Electrical Stimulation:     0    mins  27662 ( );  Mechanical Traction:             mins  89202;   Paraffin                       _____  mins   Dry Needling               _____   mins      Timed Treatment:   48   mins   Total Treatment:     70  mins    Ayesha Horowitz PT  [place] License # [id number]  Physical Therapist    Electronically signed by Ayesha Horowitz PT, 05/22/25, 11:33 AM EDT

## 2025-06-02 ENCOUNTER — OFFICE VISIT (OUTPATIENT)
Dept: ORTHOPEDIC SURGERY | Facility: CLINIC | Age: 80
End: 2025-06-02
Payer: MEDICARE

## 2025-06-02 VITALS — HEIGHT: 59 IN | WEIGHT: 165.6 LBS | TEMPERATURE: 97.5 F | BODY MASS INDEX: 33.38 KG/M2

## 2025-06-02 DIAGNOSIS — M17.11 PRIMARY LOCALIZED OSTEOARTHROSIS OF RIGHT LOWER LEG: Primary | ICD-10-CM

## 2025-06-02 DIAGNOSIS — M17.10 PATELLOFEMORAL ARTHRITIS: ICD-10-CM

## 2025-06-02 RX ORDER — LIDOCAINE HYDROCHLORIDE 10 MG/ML
2 INJECTION, SOLUTION EPIDURAL; INFILTRATION; INTRACAUDAL; PERINEURAL
Status: COMPLETED | OUTPATIENT
Start: 2025-06-02 | End: 2025-06-02

## 2025-06-02 RX ORDER — METHYLPREDNISOLONE ACETATE 80 MG/ML
80 INJECTION, SUSPENSION INTRA-ARTICULAR; INTRALESIONAL; INTRAMUSCULAR; SOFT TISSUE
Status: COMPLETED | OUTPATIENT
Start: 2025-06-02 | End: 2025-06-02

## 2025-06-02 RX ADMIN — LIDOCAINE HYDROCHLORIDE 2 ML: 10 INJECTION, SOLUTION EPIDURAL; INFILTRATION; INTRACAUDAL; PERINEURAL at 08:51

## 2025-06-02 RX ADMIN — METHYLPREDNISOLONE ACETATE 80 MG: 80 INJECTION, SUSPENSION INTRA-ARTICULAR; INTRALESIONAL; INTRAMUSCULAR; SOFT TISSUE at 08:51

## 2025-06-18 RX ORDER — LOSARTAN POTASSIUM 100 MG/1
100 TABLET ORAL DAILY
Qty: 90 TABLET | Refills: 0 | Status: SHIPPED | OUTPATIENT
Start: 2025-06-18

## 2025-07-23 ENCOUNTER — OFFICE VISIT (OUTPATIENT)
Dept: FAMILY MEDICINE CLINIC | Facility: CLINIC | Age: 80
End: 2025-07-23
Payer: MEDICARE

## 2025-07-23 VITALS
HEIGHT: 59 IN | SYSTOLIC BLOOD PRESSURE: 104 MMHG | BODY MASS INDEX: 32.84 KG/M2 | WEIGHT: 162.9 LBS | DIASTOLIC BLOOD PRESSURE: 74 MMHG | OXYGEN SATURATION: 99 % | HEART RATE: 76 BPM | RESPIRATION RATE: 16 BRPM

## 2025-07-23 DIAGNOSIS — R07.9 CHEST PAIN, UNSPECIFIED TYPE: Primary | ICD-10-CM

## 2025-07-23 PROCEDURE — 93000 ELECTROCARDIOGRAM COMPLETE: CPT | Performed by: INTERNAL MEDICINE

## 2025-07-23 PROCEDURE — 3074F SYST BP LT 130 MM HG: CPT | Performed by: INTERNAL MEDICINE

## 2025-07-23 PROCEDURE — 1125F AMNT PAIN NOTED PAIN PRSNT: CPT | Performed by: INTERNAL MEDICINE

## 2025-07-23 PROCEDURE — 3078F DIAST BP <80 MM HG: CPT | Performed by: INTERNAL MEDICINE

## 2025-07-23 PROCEDURE — 99213 OFFICE O/P EST LOW 20 MIN: CPT | Performed by: INTERNAL MEDICINE

## 2025-07-29 NOTE — PROGRESS NOTES
Subjective   Jody Simmons is a 79 y.o. female. Patient is here today for   Chief Complaint   Patient presents with    Primary Care Follow-Up     Labs        Primary Care Follow-Up  Conditions present: hypertension and hyperlipidemia    Current symptoms: peripheral edema    Treatment compliance:  All of the time  Treatment barriers:  Lack of exercise  Exercise:  Intermittently  Hyperlipidemia:     Additional hyperlipidemia information:  Had vascular screening s on suhail 10, 2025 / good results    History of Present Illness  The patient presents for evaluation of knee pain, elevated alkaline phosphatase, and hot flashes.    She has been experiencing knee pain, which was significantly alleviated by an injection administered by Dr. Echeverria over a year ago. This treatment has enabled her to ascend and descend stairs with ease, a task she previously found challenging. She estimates a 90% improvement in her condition. Dr. Echeverria advised against knee replacement surgery until other treatments, such as injections and therapy, have been exhausted.    Her alkaline phosphatase level remains at 150, a decrease from a previous reading of 174. She had blood work done last week.    She continues to experience hot flashes, even though she underwent menopause at age 46 and had her uterus and ovaries removed. She also had breast cancer, which she believes may be affecting her hormones. However, hormone treatments were not an option due to the lack of an estrogen connection. She is curious about the cause of her hot flashes and wonders if they could be related to her thyroid function or medication. Despite these concerns, she feels better than she has in years.    She has been experiencing mysterious aches across her ribs. She visits the oncologist twice a year, who does not seem worried about it. They suggested waiting before taking a scan. The aches have since resolved, and she believes it is due to muscle strain from yard  work.    She had a mammogram in 04/2025, which was normal. She also had a vascular screen, which showed no buildup in the carotid artery. Her blood pressure today is 104/74. Her triglycerides have improved. Her A1c was slightly elevated at 6.1. She attributes this to consuming ice cream when her air conditioning unit was not working.    Hobbies: Yard work  Living Condition: Lives with a cat    PAST SURGICAL HISTORY:  - Uterus and ovaries removed  - Breast cancer surgery  - Radiation therapy for breast cancer      Vitals:    07/23/25 1023   BP: 104/74   Pulse: 76   Resp: 16   SpO2: 99%     Body mass index is 32.88 kg/m².    Past Medical History:   Diagnosis Date    Bilateral malignant neoplasm of overlapping sites of breast in female 02/28/2022    Dyslipidemia     Endometrial polyp 04/21/2015    benign     Fibroid     Frozen shoulder 1980s    H/O pelvic mass     Hyperlipidemia     Hypertension yrs ago    Osteopenia     PMB (postmenopausal bleeding)     PONV (postoperative nausea and vomiting) after hysterectomy 2015    days following surgery    Primary osteoarthritis of right knee 03/11/2022    Screen for colon cancer 2021    Cologuard: negative result    UTI (urinary tract infection)     Visual impairment Yrs ago    Glasses      No Known Allergies   Social History     Socioeconomic History    Marital status: Single   Tobacco Use    Smoking status: Never     Passive exposure: Never    Smokeless tobacco: Never   Vaping Use    Vaping status: Never Used   Substance and Sexual Activity    Alcohol use: Yes     Alcohol/week: 1.0 standard drink of alcohol     Types: 1 Glasses of wine per week     Comment: SOCIAL rare    Drug use: No    Sexual activity: Not Currently     Partners: Male     Birth control/protection: Post-menopausal        Current Outpatient Medications:     acetaminophen (TYLENOL) 325 MG tablet, Take 2 tablets by mouth Every 6 (Six) Hours As Needed for Mild Pain., Disp: , Rfl:     atorvastatin (LIPITOR) 20 MG  tablet, Take 1 tablet by mouth Daily., Disp: 90 tablet, Rfl: 3    Calcium Carbonate-Vitamin D (CALTRATE 600+D PO), Take  by mouth., Disp: , Rfl:     Carboxymethylcellulose Sodium (EYE DROPS OP), Apply  to eye(s) as directed by provider., Disp: , Rfl:     Cholecalciferol (VITAMIN D) 1000 UNITS tablet, Take 1 tablet by mouth Daily., Disp: , Rfl:     Glucosamine-Chondroit-Vit C-Mn (GLUCOSAMINE CHONDR 1500 COMPLX PO), , Disp: , Rfl:     ibuprofen (ADVIL,MOTRIN) 400 MG tablet, Take 1 tablet by mouth Every 6 (Six) Hours As Needed for Mild Pain., Disp: , Rfl:     losartan (COZAAR) 100 MG tablet, Take 1 tablet by mouth Daily., Disp: 90 tablet, Rfl: 0    Multiple Vitamin (MULTI VITAMIN DAILY PO), Take 1 tablet by mouth Daily., Disp: , Rfl:     multivitamin with minerals (MULTIVITAMIN ADULT PO), Take 1 tablet by mouth Daily. PRESERVATION AREDS (Patient taking differently: Take 2 tablets by mouth Daily. PRESERVATION AREDS), Disp: , Rfl:      Objective     Review of Systems   Constitutional: Negative.    HENT: Negative.     Respiratory: Negative.     Cardiovascular: Negative.    Musculoskeletal:         She has some left knee pain.  She experienced some relief with intra-articular steroid injection by Dr. Yael Echeverria.   Psychiatric/Behavioral: Negative.         Physical Exam  Vitals and nursing note reviewed.   Constitutional:       Appearance: Normal appearance. She is obese.   Cardiovascular:      Rate and Rhythm: Regular rhythm.      Heart sounds: Normal heart sounds. No murmur heard.     No gallop.   Pulmonary:      Effort: No respiratory distress.      Breath sounds: Normal breath sounds. No wheezing or rales.   Neurological:      Mental Status: She is alert.   Psychiatric:         Mood and Affect: Mood normal.         Behavior: Behavior normal.         Thought Content: Thought content normal.       Physical Exam  Respiratory: Clear to auscultation, no wheezing, rales or rhonchi  Cardiovascular: Regular rate and rhythm,  no murmurs, rubs, or gallops    Results  Labs   - Alkaline Phosphatase: 150 U/L   - A1c: 6.1%    Imaging   - Vascular Screen of the Carotid Artery: No buildup in the carotid artery    Assessment & Plan    Problems Addressed this Visit    None  Visit Diagnoses         Chest pain, unspecified type    -  Primary    Relevant Orders    ECG 12 Lead          Diagnoses         Codes Comments      Chest pain, unspecified type    -  Primary ICD-10-CM: R07.9  ICD-9-CM: 786.50           Assessment & Plan  1. Knee pain.  - Received a knee injection from Dr. Echeverria, significantly improving symptoms by 90%.  - Able to go up and down stairs with ease.  - Current treatment is effective; no further intervention needed at this time.  - Discussed the option of surgery if injections and therapy become ineffective.    2. Elevated alkaline phosphatase.  - Alkaline phosphatase level remains elevated at 150, down from 174.  - Cause is unclear but could be related to a vitamin D deficiency.  - Labs will be ordered to check TSH, B12, and vitamin D levels.  - Previous lab work showed improved triglycerides and a slightly elevated A1c at 6.1.    3. Hot flashes.  - Continues to experience intermittent hot flashes despite menopause at age 46 and having had a hysterectomy and oophorectomy.  - Hot flashes are not debilitating.  - An EKG will be performed today to rule out any cardiac issues.  - Thyroid function test last conducted in 11/2022; will be rechecked.    4. Muscle strain.  - Reports that mysterious aches across her ribs have resolved.  - Likely due to muscle strain from yard work.  - No further action needed as symptoms have resolved.  - Oncologist not concerned about the aches.    Follow-up: The patient will follow up after 12/06/2025.      No follow-ups on file.    Patient or patient representative verbalized consent for the use of Ambient Listening during the visit with  Manolo Reddy MD for chart documentation. 7/29/2025  14:12  EDT

## 2025-07-30 ENCOUNTER — TELEPHONE (OUTPATIENT)
Dept: FAMILY MEDICINE CLINIC | Facility: CLINIC | Age: 80
End: 2025-07-30

## 2025-07-31 RX ORDER — ATORVASTATIN CALCIUM 20 MG/1
20 TABLET, FILM COATED ORAL DAILY
Qty: 90 TABLET | Refills: 1 | Status: SHIPPED | OUTPATIENT
Start: 2025-07-31

## 2025-08-04 DIAGNOSIS — E55.9 MILD VITAMIN D DEFICIENCY: ICD-10-CM

## 2025-08-04 DIAGNOSIS — Z00.00 ROUTINE GENERAL MEDICAL EXAMINATION AT A HEALTH CARE FACILITY: ICD-10-CM

## 2025-08-04 DIAGNOSIS — M85.80 OSTEOPENIA, UNSPECIFIED LOCATION: Primary | ICD-10-CM

## 2025-08-15 LAB
1,25(OH)2D SERPL-MCNC: 51.2 PG/ML (ref 24.8–81.5)
T4 FREE SERPL-MCNC: 0.92 NG/DL (ref 0.92–1.68)
TSH SERPL DL<=0.005 MIU/L-ACNC: 1.91 UIU/ML (ref 0.27–4.2)

## 2025-08-28 RX ORDER — LOSARTAN POTASSIUM 100 MG/1
100 TABLET ORAL DAILY
Qty: 90 TABLET | Refills: 1 | Status: SHIPPED | OUTPATIENT
Start: 2025-08-28